# Patient Record
Sex: FEMALE | Race: WHITE | Employment: UNEMPLOYED
[De-identification: names, ages, dates, MRNs, and addresses within clinical notes are randomized per-mention and may not be internally consistent; named-entity substitution may affect disease eponyms.]

---

## 2017-01-05 ENCOUNTER — TELEPHONE (OUTPATIENT)
Dept: PEDIATRIC UROLOGY | Facility: CLINIC | Age: 22
End: 2017-01-05

## 2017-02-09 ENCOUNTER — TELEPHONE (OUTPATIENT)
Dept: UROLOGY | Facility: CLINIC | Age: 22
End: 2017-02-09

## 2017-03-01 ENCOUNTER — TELEPHONE (OUTPATIENT)
Dept: UROLOGY | Facility: CLINIC | Age: 22
End: 2017-03-01

## 2017-03-02 ENCOUNTER — TELEPHONE (OUTPATIENT)
Dept: UROLOGY | Facility: CLINIC | Age: 22
End: 2017-03-02

## 2017-03-02 ENCOUNTER — HOSPITAL ENCOUNTER (OUTPATIENT)
Age: 22
Setting detail: SPECIMEN
Discharge: HOME | End: 2017-03-02
Payer: COMMERCIAL

## 2017-03-02 ENCOUNTER — OFFICE VISIT (OUTPATIENT)
Dept: UROLOGY | Facility: CLINIC | Age: 22
End: 2017-03-02

## 2017-03-02 VITALS
TEMPERATURE: 98.4 F | DIASTOLIC BLOOD PRESSURE: 82 MMHG | HEIGHT: 60 IN | HEART RATE: 84 BPM | BODY MASS INDEX: 31.45 KG/M2 | SYSTOLIC BLOOD PRESSURE: 119 MMHG | WEIGHT: 160.2 LBS

## 2017-03-02 DIAGNOSIS — N39.0 LOWER URINARY TRACT INFECTIOUS DISEASE: ICD-10-CM

## 2017-03-02 DIAGNOSIS — Q05.9 MYELOMENINGOCELE (HCC): ICD-10-CM

## 2017-03-02 DIAGNOSIS — N31.9 NEUROGENIC BLADDER: Primary | ICD-10-CM

## 2017-03-02 DIAGNOSIS — R33.9 BLADDER RETENTION OF URINE: ICD-10-CM

## 2017-03-02 DIAGNOSIS — N31.9 NEUROGENIC BLADDER: ICD-10-CM

## 2017-03-02 LAB
-: ABNORMAL
AMORPHOUS: ABNORMAL
BACTERIA: ABNORMAL
BILIRUBIN URINE: NEGATIVE
BILIRUBIN, POC: ABNORMAL
BLOOD URINE, POC: ABNORMAL
CASTS UA: ABNORMAL /LPF (ref 0–8)
CLARITY, POC: ABNORMAL
COLOR, POC: YELLOW
COLOR: YELLOW
COMMENT UA: ABNORMAL
CRYSTALS, UA: ABNORMAL /HPF
EPITHELIAL CELLS UA: ABNORMAL /HPF (ref 0–5)
GLUCOSE URINE, POC: ABNORMAL
GLUCOSE URINE: NEGATIVE
KETONES, POC: ABNORMAL
KETONES, URINE: NEGATIVE
LEUKOCYTE EST, POC: ABNORMAL
LEUKOCYTE ESTERASE, URINE: ABNORMAL
MUCUS: ABNORMAL
NITRITE, POC: POSITIVE
NITRITE, URINE: POSITIVE
OTHER OBSERVATIONS UA: ABNORMAL
PH UA: 6 (ref 5–8)
PH, POC: ABNORMAL
PROTEIN UA: NEGATIVE
PROTEIN, POC: ABNORMAL
RBC UA: ABNORMAL /HPF (ref 0–4)
RENAL EPITHELIAL, UA: ABNORMAL /HPF
SPECIFIC GRAVITY UA: 1.01 (ref 1–1.03)
SPECIFIC GRAVITY, POC: ABNORMAL
TRICHOMONAS: ABNORMAL
TURBIDITY: ABNORMAL
URINE HGB: ABNORMAL
UROBILINOGEN, POC: ABNORMAL
UROBILINOGEN, URINE: NORMAL
WBC UA: ABNORMAL /HPF (ref 0–5)
YEAST: ABNORMAL

## 2017-03-02 PROCEDURE — G8417 CALC BMI ABV UP PARAM F/U: HCPCS | Performed by: NURSE PRACTITIONER

## 2017-03-02 PROCEDURE — 99204 OFFICE O/P NEW MOD 45 MIN: CPT | Performed by: NURSE PRACTITIONER

## 2017-03-02 PROCEDURE — G8484 FLU IMMUNIZE NO ADMIN: HCPCS | Performed by: NURSE PRACTITIONER

## 2017-03-02 PROCEDURE — 81002 URINALYSIS NONAUTO W/O SCOPE: CPT | Performed by: NURSE PRACTITIONER

## 2017-03-02 PROCEDURE — 4004F PT TOBACCO SCREEN RCVD TLK: CPT | Performed by: NURSE PRACTITIONER

## 2017-03-02 PROCEDURE — G8427 DOCREV CUR MEDS BY ELIG CLIN: HCPCS | Performed by: NURSE PRACTITIONER

## 2017-03-02 RX ORDER — TRAZODONE HYDROCHLORIDE 50 MG/1
TABLET ORAL
Refills: 0 | COMMUNITY
Start: 2017-02-24 | End: 2019-03-21 | Stop reason: ALTCHOICE

## 2017-03-02 RX ORDER — FAMOTIDINE 20 MG/1
TABLET, FILM COATED ORAL
Refills: 0 | COMMUNITY
Start: 2017-01-13

## 2017-03-02 RX ORDER — TOPIRAMATE 25 MG/1
TABLET ORAL
Refills: 0 | COMMUNITY
Start: 2017-02-24 | End: 2019-09-23 | Stop reason: ALTCHOICE

## 2017-03-02 ASSESSMENT — ENCOUNTER SYMPTOMS
COUGH: 0
NAUSEA: 1
VOMITING: 0
WHEEZING: 0
COLOR CHANGE: 0
ABDOMINAL PAIN: 1
SHORTNESS OF BREATH: 0
EYE REDNESS: 0
BACK PAIN: 0
EYE PAIN: 0

## 2017-03-04 LAB
CULTURE: ABNORMAL
CULTURE: ABNORMAL
Lab: ABNORMAL
ORGANISM: ABNORMAL
SPECIMEN DESCRIPTION: ABNORMAL
STATUS: ABNORMAL

## 2017-03-06 DIAGNOSIS — N39.0 URINARY TRACT INFECTION, SITE UNSPECIFIED: Primary | ICD-10-CM

## 2017-03-06 RX ORDER — CEPHALEXIN 500 MG/1
500 CAPSULE ORAL 3 TIMES DAILY
Qty: 21 CAPSULE | Refills: 0 | Status: SHIPPED | OUTPATIENT
Start: 2017-03-06 | End: 2019-03-21

## 2017-03-07 ENCOUNTER — TELEPHONE (OUTPATIENT)
Dept: UROLOGY | Facility: CLINIC | Age: 22
End: 2017-03-07

## 2017-08-23 ENCOUNTER — TELEPHONE (OUTPATIENT)
Dept: UROLOGY | Age: 22
End: 2017-08-23

## 2019-03-21 ENCOUNTER — OFFICE VISIT (OUTPATIENT)
Dept: UROLOGY | Age: 24
End: 2019-03-21
Payer: MEDICARE

## 2019-03-21 VITALS
SYSTOLIC BLOOD PRESSURE: 98 MMHG | TEMPERATURE: 98.3 F | HEIGHT: 60 IN | BODY MASS INDEX: 31.96 KG/M2 | WEIGHT: 162.8 LBS | DIASTOLIC BLOOD PRESSURE: 66 MMHG | HEART RATE: 85 BPM

## 2019-03-21 DIAGNOSIS — N31.9 NEUROGENIC BLADDER: Primary | ICD-10-CM

## 2019-03-21 DIAGNOSIS — Q05.9 MYELOMENINGOCELE (HCC): ICD-10-CM

## 2019-03-21 PROCEDURE — 99213 OFFICE O/P EST LOW 20 MIN: CPT | Performed by: NURSE PRACTITIONER

## 2019-03-21 RX ORDER — LEVOFLOXACIN 500 MG/1
500 TABLET, FILM COATED ORAL DAILY
COMMUNITY
End: 2019-09-23 | Stop reason: ALTCHOICE

## 2019-03-21 RX ORDER — ESCITALOPRAM OXALATE 20 MG/1
1 TABLET ORAL DAILY
COMMUNITY
Start: 2019-01-01

## 2019-03-21 RX ORDER — ARIPIPRAZOLE 5 MG/1
10 TABLET ORAL DAILY
COMMUNITY
Start: 2019-01-03 | End: 2019-09-23 | Stop reason: ALTCHOICE

## 2019-03-21 RX ORDER — QUETIAPINE FUMARATE 25 MG/1
200 TABLET, FILM COATED ORAL NIGHTLY
COMMUNITY
Start: 2018-12-29

## 2019-03-21 RX ORDER — ONDANSETRON 4 MG/1
1 TABLET, FILM COATED ORAL 3 TIMES DAILY PRN
Refills: 0 | COMMUNITY
Start: 2019-01-10 | End: 2019-03-21

## 2019-03-21 RX ORDER — PRAZOSIN HYDROCHLORIDE 1 MG/1
1 CAPSULE ORAL NIGHTLY
COMMUNITY
Start: 2018-12-13

## 2019-03-21 RX ORDER — ESTRADIOL 0.1 MG/D
FILM, EXTENDED RELEASE TRANSDERMAL
COMMUNITY
Start: 2018-11-27 | End: 2019-09-23 | Stop reason: ALTCHOICE

## 2019-03-21 RX ORDER — LORAZEPAM 0.5 MG/1
0.5 TABLET ORAL 2 TIMES DAILY PRN
COMMUNITY
Start: 2018-12-27 | End: 2019-09-23

## 2019-03-21 ASSESSMENT — ENCOUNTER SYMPTOMS
ABDOMINAL PAIN: 0
BACK PAIN: 0
COLOR CHANGE: 0
VOMITING: 0
SHORTNESS OF BREATH: 0
EYE REDNESS: 0
COUGH: 0
EYE PAIN: 0
NAUSEA: 0
WHEEZING: 0

## 2019-09-23 ENCOUNTER — OFFICE VISIT (OUTPATIENT)
Dept: UROLOGY | Age: 24
End: 2019-09-23
Payer: MEDICARE

## 2019-09-23 ENCOUNTER — ANESTHESIA EVENT (OUTPATIENT)
Dept: OPERATING ROOM | Age: 24
End: 2019-09-23
Payer: MEDICARE

## 2019-09-23 ENCOUNTER — HOSPITAL ENCOUNTER (OUTPATIENT)
Age: 24
Setting detail: OUTPATIENT SURGERY
Discharge: HOME | End: 2019-09-23
Attending: UROLOGY | Admitting: UROLOGY
Payer: MEDICARE

## 2019-09-23 ENCOUNTER — ANESTHESIA (OUTPATIENT)
Dept: OPERATING ROOM | Age: 24
End: 2019-09-23
Payer: MEDICARE

## 2019-09-23 ENCOUNTER — APPOINTMENT (OUTPATIENT)
Dept: GENERAL RADIOLOGY | Age: 24
End: 2019-09-23
Attending: UROLOGY
Payer: MEDICARE

## 2019-09-23 VITALS
RESPIRATION RATE: 22 BRPM | OXYGEN SATURATION: 94 % | DIASTOLIC BLOOD PRESSURE: 77 MMHG | SYSTOLIC BLOOD PRESSURE: 105 MMHG

## 2019-09-23 VITALS
OXYGEN SATURATION: 94 % | HEIGHT: 60 IN | RESPIRATION RATE: 12 BRPM | WEIGHT: 167 LBS | TEMPERATURE: 98.1 F | BODY MASS INDEX: 32.79 KG/M2 | DIASTOLIC BLOOD PRESSURE: 80 MMHG | HEART RATE: 63 BPM | SYSTOLIC BLOOD PRESSURE: 112 MMHG

## 2019-09-23 VITALS
TEMPERATURE: 98.7 F | BODY MASS INDEX: 33.38 KG/M2 | HEIGHT: 60 IN | SYSTOLIC BLOOD PRESSURE: 108 MMHG | DIASTOLIC BLOOD PRESSURE: 79 MMHG | WEIGHT: 170 LBS | HEART RATE: 80 BPM

## 2019-09-23 DIAGNOSIS — N13.2 HYDRONEPHROSIS WITH URINARY OBSTRUCTION DUE TO URETERAL CALCULUS: ICD-10-CM

## 2019-09-23 DIAGNOSIS — N20.0 KIDNEY STONE: ICD-10-CM

## 2019-09-23 DIAGNOSIS — N31.9 NEUROGENIC BLADDER: Primary | ICD-10-CM

## 2019-09-23 DIAGNOSIS — N20.1 CALCULUS OF URETER: Primary | ICD-10-CM

## 2019-09-23 LAB — HCG, PREGNANCY URINE (POC): NEGATIVE

## 2019-09-23 PROCEDURE — 99214 OFFICE O/P EST MOD 30 MIN: CPT | Performed by: UROLOGY

## 2019-09-23 PROCEDURE — 6360000002 HC RX W HCPCS: Performed by: NURSE ANESTHETIST, CERTIFIED REGISTERED

## 2019-09-23 PROCEDURE — 81025 URINE PREGNANCY TEST: CPT

## 2019-09-23 PROCEDURE — C2617 STENT, NON-COR, TEM W/O DEL: HCPCS | Performed by: UROLOGY

## 2019-09-23 PROCEDURE — 7100000010 HC PHASE II RECOVERY - FIRST 15 MIN: Performed by: UROLOGY

## 2019-09-23 PROCEDURE — C1758 CATHETER, URETERAL: HCPCS | Performed by: UROLOGY

## 2019-09-23 PROCEDURE — 3700000001 HC ADD 15 MINUTES (ANESTHESIA): Performed by: UROLOGY

## 2019-09-23 PROCEDURE — 2709999900 HC NON-CHARGEABLE SUPPLY: Performed by: UROLOGY

## 2019-09-23 PROCEDURE — 3600000002 HC SURGERY LEVEL 2 BASE: Performed by: UROLOGY

## 2019-09-23 PROCEDURE — 6360000002 HC RX W HCPCS: Performed by: ANESTHESIOLOGY

## 2019-09-23 PROCEDURE — 7100000011 HC PHASE II RECOVERY - ADDTL 15 MIN: Performed by: UROLOGY

## 2019-09-23 PROCEDURE — C1769 GUIDE WIRE: HCPCS | Performed by: UROLOGY

## 2019-09-23 PROCEDURE — 2580000003 HC RX 258: Performed by: ANESTHESIOLOGY

## 2019-09-23 PROCEDURE — 3600000012 HC SURGERY LEVEL 2 ADDTL 15MIN: Performed by: UROLOGY

## 2019-09-23 PROCEDURE — 3700000000 HC ANESTHESIA ATTENDED CARE: Performed by: UROLOGY

## 2019-09-23 PROCEDURE — 2580000003 HC RX 258: Performed by: NURSE ANESTHETIST, CERTIFIED REGISTERED

## 2019-09-23 PROCEDURE — 74018 RADEX ABDOMEN 1 VIEW: CPT

## 2019-09-23 PROCEDURE — 2580000003 HC RX 258: Performed by: UROLOGY

## 2019-09-23 DEVICE — STENT URET 6FR L24CM PERCFLX HYDR+ DBL PGTL THRD 2
Type: IMPLANTABLE DEVICE | Site: URETER | Status: FUNCTIONAL
Removed: 2019-10-02

## 2019-09-23 RX ORDER — PROPOFOL 10 MG/ML
INJECTION, EMULSION INTRAVENOUS CONTINUOUS PRN
Status: DISCONTINUED | OUTPATIENT
Start: 2019-09-23 | End: 2019-09-23 | Stop reason: SDUPTHER

## 2019-09-23 RX ORDER — ONDANSETRON 2 MG/ML
4 INJECTION INTRAMUSCULAR; INTRAVENOUS
Status: DISCONTINUED | OUTPATIENT
Start: 2019-09-23 | End: 2019-09-23 | Stop reason: HOSPADM

## 2019-09-23 RX ORDER — PROPOFOL 10 MG/ML
INJECTION, EMULSION INTRAVENOUS PRN
Status: DISCONTINUED | OUTPATIENT
Start: 2019-09-23 | End: 2019-09-23 | Stop reason: SDUPTHER

## 2019-09-23 RX ORDER — FENTANYL CITRATE 50 UG/ML
INJECTION, SOLUTION INTRAMUSCULAR; INTRAVENOUS PRN
Status: DISCONTINUED | OUTPATIENT
Start: 2019-09-23 | End: 2019-09-23 | Stop reason: SDUPTHER

## 2019-09-23 RX ORDER — MIDAZOLAM HYDROCHLORIDE 1 MG/ML
1 INJECTION INTRAMUSCULAR; INTRAVENOUS EVERY 10 MIN PRN
Status: COMPLETED | OUTPATIENT
Start: 2019-09-23 | End: 2019-09-23

## 2019-09-23 RX ORDER — CIPROFLOXACIN 500 MG/1
500 TABLET, FILM COATED ORAL 2 TIMES DAILY
COMMUNITY
End: 2019-10-01 | Stop reason: ALTCHOICE

## 2019-09-23 RX ORDER — FENTANYL CITRATE 50 UG/ML
25 INJECTION, SOLUTION INTRAMUSCULAR; INTRAVENOUS EVERY 5 MIN PRN
Status: COMPLETED | OUTPATIENT
Start: 2019-09-23 | End: 2019-09-23

## 2019-09-23 RX ORDER — SODIUM CHLORIDE, SODIUM LACTATE, POTASSIUM CHLORIDE, CALCIUM CHLORIDE 600; 310; 30; 20 MG/100ML; MG/100ML; MG/100ML; MG/100ML
INJECTION, SOLUTION INTRAVENOUS CONTINUOUS
Status: DISCONTINUED | OUTPATIENT
Start: 2019-09-23 | End: 2019-09-23 | Stop reason: HOSPADM

## 2019-09-23 RX ORDER — SODIUM CHLORIDE, SODIUM LACTATE, POTASSIUM CHLORIDE, CALCIUM CHLORIDE 600; 310; 30; 20 MG/100ML; MG/100ML; MG/100ML; MG/100ML
INJECTION, SOLUTION INTRAVENOUS CONTINUOUS PRN
Status: DISCONTINUED | OUTPATIENT
Start: 2019-09-23 | End: 2019-09-23 | Stop reason: SDUPTHER

## 2019-09-23 RX ORDER — OXYCODONE HYDROCHLORIDE AND ACETAMINOPHEN 5; 325 MG/1; MG/1
1 TABLET ORAL EVERY 6 HOURS PRN
Qty: 20 TABLET | Refills: 0 | Status: SHIPPED | OUTPATIENT
Start: 2019-09-23 | End: 2019-09-30

## 2019-09-23 RX ORDER — MIDAZOLAM HYDROCHLORIDE 1 MG/ML
INJECTION INTRAMUSCULAR; INTRAVENOUS PRN
Status: DISCONTINUED | OUTPATIENT
Start: 2019-09-23 | End: 2019-09-23 | Stop reason: SDUPTHER

## 2019-09-23 RX ORDER — ACETAMINOPHEN 500 MG
1000 TABLET ORAL EVERY 6 HOURS PRN
Status: ON HOLD | COMMUNITY
End: 2019-10-19 | Stop reason: HOSPADM

## 2019-09-23 RX ORDER — CEFAZOLIN SODIUM 1 G/3ML
INJECTION, POWDER, FOR SOLUTION INTRAMUSCULAR; INTRAVENOUS PRN
Status: DISCONTINUED | OUTPATIENT
Start: 2019-09-23 | End: 2019-09-23 | Stop reason: SDUPTHER

## 2019-09-23 RX ORDER — LABETALOL HYDROCHLORIDE 5 MG/ML
5 INJECTION, SOLUTION INTRAVENOUS EVERY 10 MIN PRN
Status: DISCONTINUED | OUTPATIENT
Start: 2019-09-23 | End: 2019-09-23 | Stop reason: HOSPADM

## 2019-09-23 RX ORDER — DIPHENHYDRAMINE HYDROCHLORIDE 50 MG/ML
12.5 INJECTION INTRAMUSCULAR; INTRAVENOUS
Status: DISCONTINUED | OUTPATIENT
Start: 2019-09-23 | End: 2019-09-23 | Stop reason: HOSPADM

## 2019-09-23 RX ORDER — OXYCODONE HYDROCHLORIDE AND ACETAMINOPHEN 5; 325 MG/1; MG/1
1 TABLET ORAL PRN
Status: DISCONTINUED | OUTPATIENT
Start: 2019-09-23 | End: 2019-09-23 | Stop reason: HOSPADM

## 2019-09-23 RX ORDER — FENTANYL CITRATE 50 UG/ML
25 INJECTION, SOLUTION INTRAMUSCULAR; INTRAVENOUS EVERY 5 MIN PRN
Status: DISCONTINUED | OUTPATIENT
Start: 2019-09-23 | End: 2019-09-23 | Stop reason: HOSPADM

## 2019-09-23 RX ORDER — MAGNESIUM HYDROXIDE 1200 MG/15ML
LIQUID ORAL CONTINUOUS PRN
Status: COMPLETED | OUTPATIENT
Start: 2019-09-23 | End: 2019-09-23

## 2019-09-23 RX ORDER — MIDAZOLAM HYDROCHLORIDE 1 MG/ML
0.5 INJECTION INTRAMUSCULAR; INTRAVENOUS 4 TIMES DAILY PRN
Status: DISCONTINUED | OUTPATIENT
Start: 2019-09-23 | End: 2019-09-23

## 2019-09-23 RX ORDER — BUSPIRONE HYDROCHLORIDE 15 MG/1
15 TABLET ORAL 2 TIMES DAILY
COMMUNITY

## 2019-09-23 RX ORDER — MORPHINE SULFATE 2 MG/ML
2 INJECTION, SOLUTION INTRAMUSCULAR; INTRAVENOUS EVERY 5 MIN PRN
Status: DISCONTINUED | OUTPATIENT
Start: 2019-09-23 | End: 2019-09-23 | Stop reason: HOSPADM

## 2019-09-23 RX ORDER — OXYCODONE HYDROCHLORIDE AND ACETAMINOPHEN 5; 325 MG/1; MG/1
2 TABLET ORAL PRN
Status: DISCONTINUED | OUTPATIENT
Start: 2019-09-23 | End: 2019-09-23 | Stop reason: HOSPADM

## 2019-09-23 RX ADMIN — FENTANYL CITRATE 25 MCG: 50 INJECTION INTRAMUSCULAR; INTRAVENOUS at 17:42

## 2019-09-23 RX ADMIN — FENTANYL CITRATE 25 MCG: 50 INJECTION INTRAMUSCULAR; INTRAVENOUS at 15:33

## 2019-09-23 RX ADMIN — PROPOFOL 50 MG: 10 INJECTION, EMULSION INTRAVENOUS at 16:13

## 2019-09-23 RX ADMIN — FENTANYL CITRATE 50 MCG: 50 INJECTION INTRAMUSCULAR; INTRAVENOUS at 16:10

## 2019-09-23 RX ADMIN — MIDAZOLAM HYDROCHLORIDE 2 MG: 1 INJECTION, SOLUTION INTRAMUSCULAR; INTRAVENOUS at 16:10

## 2019-09-23 RX ADMIN — MIDAZOLAM HYDROCHLORIDE 1 MG: 1 INJECTION, SOLUTION INTRAMUSCULAR; INTRAVENOUS at 14:25

## 2019-09-23 RX ADMIN — PROPOFOL 100 MCG/KG/MIN: 10 INJECTION, EMULSION INTRAVENOUS at 16:13

## 2019-09-23 RX ADMIN — SODIUM CHLORIDE, POTASSIUM CHLORIDE, SODIUM LACTATE AND CALCIUM CHLORIDE: 600; 310; 30; 20 INJECTION, SOLUTION INTRAVENOUS at 13:38

## 2019-09-23 RX ADMIN — FENTANYL CITRATE 25 MCG: 50 INJECTION INTRAMUSCULAR; INTRAVENOUS at 17:47

## 2019-09-23 RX ADMIN — FENTANYL CITRATE 25 MCG: 50 INJECTION INTRAMUSCULAR; INTRAVENOUS at 17:37

## 2019-09-23 RX ADMIN — CEFAZOLIN 2000 MG: 1 INJECTION, POWDER, FOR SOLUTION INTRAMUSCULAR; INTRAVENOUS at 16:17

## 2019-09-23 RX ADMIN — MIDAZOLAM HYDROCHLORIDE 1 MG: 1 INJECTION, SOLUTION INTRAMUSCULAR; INTRAVENOUS at 14:11

## 2019-09-23 RX ADMIN — FENTANYL CITRATE 50 MCG: 50 INJECTION INTRAMUSCULAR; INTRAVENOUS at 16:25

## 2019-09-23 RX ADMIN — SODIUM CHLORIDE, POTASSIUM CHLORIDE, SODIUM LACTATE AND CALCIUM CHLORIDE: 600; 310; 30; 20 INJECTION, SOLUTION INTRAVENOUS at 15:41

## 2019-09-23 ASSESSMENT — PULMONARY FUNCTION TESTS
PIF_VALUE: 1
PIF_VALUE: 0
PIF_VALUE: 1
PIF_VALUE: 0
PIF_VALUE: 1
PIF_VALUE: 0
PIF_VALUE: 1
PIF_VALUE: 0
PIF_VALUE: 0
PIF_VALUE: 1
PIF_VALUE: 3
PIF_VALUE: 1
PIF_VALUE: 1
PIF_VALUE: 0
PIF_VALUE: 1
PIF_VALUE: 1

## 2019-09-23 ASSESSMENT — ENCOUNTER SYMPTOMS
COLOR CHANGE: 0
EYE REDNESS: 0
RHINORRHEA: 1
WHEEZING: 0
BACK PAIN: 0
SHORTNESS OF BREATH: 1
EYE PAIN: 0
ABDOMINAL PAIN: 0
COUGH: 1
NAUSEA: 0
VOMITING: 0

## 2019-09-23 ASSESSMENT — PAIN DESCRIPTION - FREQUENCY: FREQUENCY: CONTINUOUS

## 2019-09-23 ASSESSMENT — PAIN SCALES - GENERAL
PAINLEVEL_OUTOF10: 5
PAINLEVEL_OUTOF10: 3
PAINLEVEL_OUTOF10: 3
PAINLEVEL_OUTOF10: 5

## 2019-09-23 ASSESSMENT — PAIN DESCRIPTION - PAIN TYPE: TYPE: SURGICAL PAIN

## 2019-09-23 ASSESSMENT — PAIN DESCRIPTION - ORIENTATION: ORIENTATION: RIGHT

## 2019-09-23 ASSESSMENT — PAIN DESCRIPTION - LOCATION: LOCATION: BACK

## 2019-09-23 ASSESSMENT — LIFESTYLE VARIABLES: SMOKING_STATUS: 1

## 2019-09-23 ASSESSMENT — PAIN DESCRIPTION - DESCRIPTORS
DESCRIPTORS: SHARP
DESCRIPTORS: CONSTANT;ACHING

## 2019-09-23 ASSESSMENT — PAIN - FUNCTIONAL ASSESSMENT: PAIN_FUNCTIONAL_ASSESSMENT: 0-10

## 2019-09-23 NOTE — H&P
shunt 4/08     Previous Urologic Surgery: Sunny Omer urethral lengethening procedure, bladder augmentation, Mitrofanoff procedure   Medications Prior to Admission:    Prior to Admission medications    Medication Sig Start Date End Date Taking? Authorizing Provider   brexpiprazole (REXULTI) 1 MG TABS tablet Take 1 mg by mouth daily   Yes Historical Provider, MD   estrogens, conjugated, (PREMARIN) 1.25 MG tablet Take 12.5 mg by mouth daily   Yes Historical Provider, MD   busPIRone (BUSPAR) 15 MG tablet Take 15 mg by mouth 2 times daily   Yes Historical Provider, MD   ciprofloxacin (CIPRO) 500 MG tablet Take 500 mg by mouth 2 times daily   Yes Historical Provider, MD   acetaminophen (TYLENOL) 500 MG tablet Take 1,000 mg by mouth every 6 hours as needed for Pain   Yes Historical Provider, MD   prazosin (MINIPRESS) 1 MG capsule Take 1 mg by mouth 2 times daily 12/13/18  Yes Historical Provider, MD   escitalopram (LEXAPRO) 20 MG tablet Take 1 tablet by mouth daily 1/1/19  Yes Historical Provider, MD   QUEtiapine (SEROQUEL) 25 MG tablet Take 50 mg by mouth nightly 12/29/18  Yes Historical Provider, MD   famotidine (PEPCID) 20 MG tablet take 1 tablet by mouth twice a day 1/13/17  Yes Historical Provider, MD   docusate sodium (COLACE) 100 MG capsule Take 100 mg by mouth daily. Yes Historical Provider, MD   OXcarbazepine (TRILEPTAL) 300 MG tablet Take 600 mg by mouth 2 times daily. Yes Historical Provider, MD   ondansetron (ZOFRAN-ODT) 4 MG disintegrating tablet Take 4 mg by mouth every 8 hours as needed for Nausea or Vomiting    Historical Provider, MD   ibuprofen (ADVIL;MOTRIN) 800 MG tablet Take 1 tablet by mouth every 8 hours as needed (pain). 2/11/14   An Owens DO   cetirizine (ZYRTEC) 10 MG tablet Take 10 mg by mouth daily as needed     Historical Provider, MD       Allergies:  Latex; Furadantin [nitrofurantoin]; Banana; Kiwi extract;  Phenobarbital; and Sulfa antibiotics    Social History:    Social History

## 2019-09-23 NOTE — PROGRESS NOTES
Review of Systems   Constitutional: Negative for activity change, chills and fever. HENT: Positive for congestion and rhinorrhea. Eyes: Negative for pain, redness and visual disturbance. Respiratory: Positive for cough and shortness of breath. Negative for wheezing. Cardiovascular: Negative for chest pain and leg swelling. Gastrointestinal: Negative for abdominal pain, nausea and vomiting. Genitourinary: Positive for flank pain and frequency. Negative for difficulty urinating, dysuria, hematuria, pelvic pain, vaginal bleeding and vaginal discharge. Musculoskeletal: Negative for back pain, joint swelling and myalgias. Skin: Negative for color change and rash. Neurological: Negative for dizziness, tremors and numbness. Hematological: Negative for adenopathy. Does not bruise/bleed easily.

## 2019-09-23 NOTE — PROGRESS NOTES
[facility]  [facility] UROLOGY SPECIALISTS - [facility]  [address] [facility] [address]  Dept: [address] [facility] Urology Office Note - Established    Patient:  Dilip Mckeon  YOB: 1995  Date: 9/23/2019    The patient is a 30 y.o. female whopresents today for evaluation of the following problems:   Chief Complaint   Patient presents with    Nephrolithiasis     [facility] f/u, kidney stone; pt c/o left flank pain        HPI  Here for follow up form [facility] for stone noted in Rt ureter with mod hydro seen on CT 9/15/19. She had a Hx urinary diversion in 1998. She is cathing 4-6x per day but has been cathing more since tthe Dx of stone because it helps releive eher pressure. She was given Cipro BID in ER for UTI- had misplaced it for a few days but started this past wed so will complete this Wed. She is using Percocet for pain BID for pain relief. She is uncomfortable 7/10 now. last dose of Percocet was 2 days ago. She has a bad cold, and has been in for the last few days. Pain is increasing with movement. bed      Summary of old records: N/A    Additional History: N/A    Procedures Today: N/A    Urinalysis today:  No results found for this visit on 09/23/19.     Imaging Reviewed during this Office Visit: 3mm prox right ureteral stone with severe hydro  (results were independently reviewed by physician and radiology report verified)    AUA Symptom Score (9/23/2019):                    @(5345)@          Last BUN and creatinine:  Lab Results   Component Value Date    BUN 17 05/30/2014     Lab Results   Component Value Date    CREATININE 0.51 05/30/2014       Additional Lab/Culture results: none    PAST MEDICAL, FAMILY AND SOCIAL HISTORY UPDATE:  Past Medical History:   Diagnosis Date    ADD (attention deficit disorder)     Anxiety     Depression     Fear of     anxiety r/t mask on face;     Hydrocephalus with V-P shunt    Internal tibial torsion     MDRO (multiple drug resistant organisms) resistance 3/2013    E.  Coli urine    Myelomeningocele ([facility] [address] L5, S1 level    Pyelonephritis 3/04    Seizure disorder ([facility] [address]     Self-catheterizes urinary bladder     Sleep apnea     does not use C-PAP    Spina bifida [facility])      Past Surgical History:   Procedure Laterality Date    BACK SURGERY  06/10/2014    release of tethered cord    BLADDER SURGERY  7/99 S/P urethral lengehtening and reimplantation with  bladder augmentation; revised 1/00    and mitrofanoff     CYSTOSCOPY  11/4/14    GROWTH PLATE SURGERY  63/81, stapled growth plate bilateral knees    HYSTERECTOMY, VAGINAL  2/10/2014    Partial    OSTEOTOMY  4/00, 12/94 bilateral distal tibial/fibula fib derotational osteotomy    OSTEOTOMY Left 2007    OTHER SURGICAL HISTORY  7/01 [place] tendon transfer    SALPINGO-OOPHORECTOMY Left 2/10/2014    with left sapinectomy, cystoscopy    VAGINAL PROLAPSE REPAIR  07/2016    VENTRICULOPERITONEAL SHUNT  9/12/93; revised 3/16/96; 1/94; 9/93; programmable shunt 4/08     Family History   Problem Relation Age of Onset    High Blood Pressure Mother     Other Mother         anxiety    High Blood Pressure Father      Outpatient Medications Marked as Taking for the 9/23/19 encounter (Office Visit) with Amy Junior MD   Medication Sig Dispense Refill    brexpiprazole (REXULTI) 1 MG TABS tablet Take 1 mg by mouth daily      estrogens, conjugated, (PREMARIN) 1.25 MG tablet Take 12.5 mg by mouth daily      busPIRone (BUSPAR) 15 MG tablet Take 15 mg by mouth 2 times daily      ciprofloxacin (CIPRO) 500 MG tablet Take 500 mg by mouth 2 times daily      prazosin (MINIPRESS) 1 MG capsule Take 1 mg by mouth 2 times daily      escitalopram (LEXAPRO) 20 MG tablet Take 1 tablet by mouth daily      QUEtiapine (SEROQUEL) 25 MG tablet Take 50 mg by mouth nightly      famotidine (PEPCID) 20 MG tablet take 1 tablet by mouth twice a day  0    ondansetron (ZOFRAN-ODT) 4 MG disintegrating tablet Take 4 mg by mouth every 8 hours as needed for Nausea or Vomiting      ibuprofen (ADVIL;MOTRIN) 800 MG tablet Take 1 tablet by mouth every 8 hours as needed (pain). 40 tablet 0    OXcarbazepine (TRILEPTAL) 300 MG tablet Take 600 mg by mouth 2 times daily.  cetirizine (ZYRTEC) 10 MG tablet Take 10 mg by mouth daily as needed         (All medications reviewed and updated by provider sincelast office visit or hospitalization)   Latex; Furadantin [nitrofurantoin]; Banana; Kiwi extract; Phenobarbital; and Sulfa antibiotics  Social History     Tobacco Use   Smoking Status Light Tobacco Smoker   Smokeless Tobacco Never Used   Tobacco Comment    QUIT 2013      (If patient a smoker, smoking cessation counseling offered)     Social History     Substance and Sexual Activity   Alcohol Use Yes    Comment: OCCASIONAL       REVIEW OF SYSTEMS:  Review of Systems      Physical Exam:      Vitals:    09/23/19 1042   BP: 108/79   Pulse: 80   Temp: 98.7 °F (37.1 °C)     Body mass index is 33.2 kg/m². Patient is a 30 y.o. female in noacute distress and alert and oriented to person, place and time. Physical Exam  Constitutional: Patient in no acute distress. Neuro: Alert andoriented to person, place and time. Psych: Mood normal, affect normal  Skin: No rash noted  HEENT: Head: Normocephalic and atraumatic  Conjunctivae and EOM are normal. Pupils are equal, round  Nose: Normal  Right External Ear: Normal; Left External Ear: Normal  Mouth: Mucosa Moist  Neck: Supple  Lungs: Respiratory effort is normal  Cardiovascular: Warm & Pink  Abdomen: Soft, non-tender, non-distended with no CVA,  No flank tenderness,  Or hepatosplenomegaly   Lymphatics: No palpable lymphadenopathy. Bladder non-tender and not distended. Musculoskeletal: Normalgait and station      Assessment and Plan      1. Neurogenic bladder    2. Kidney stone    3.  Hydronephrosis with urinary obstruction due to ureteral calculus           Plan:

## 2019-09-24 ENCOUNTER — TELEPHONE (OUTPATIENT)
Dept: UROLOGY | Age: 24
End: 2019-09-24

## 2019-09-24 DIAGNOSIS — N20.0 KIDNEY STONE: Primary | ICD-10-CM

## 2019-09-24 NOTE — ANESTHESIA POSTPROCEDURE EVALUATION
Department of Anesthesiology  Postprocedure Note    Patient: Laure Miller  MRN: [id number]  YOB: 1995  Date of evaluation: 9/23/2019  Time:  10:36 PM     Procedure Summary     Date:  09/23/19 Room / Location:  [facility] OR [address] [place] [address] OR    Anesthesia Start:  5274 Anesthesia Stop:  6200    Procedure:  CYSTOSCOPY URETERAL STENT INSERTION (Right ) Diagnosis:  (ADD-ON)    Surgeon:  Dionicio Rutledge MD Responsible Provider:  Tab Bustillo MD    Anesthesia Type:  MAC ASA Status:  3          Anesthesia Type: MAC    Diya Phase I: Diya Score: 8    Diya Phase II: Diya Score: 10    Last vitals: Reviewed and per EMR flowsheets.    POST-OP ANESTHESIA NOTE       /80   Pulse 63   Temp 98.1 °F (36.7 °C)   Resp 12   Ht 5' (1.524 m)   Wt 167 lb (75.8 kg)   LMP 02/06/2012   SpO2 94%   BMI 32.61 kg/m²    Pain Assessment: 0-10  Pain Level: 3          Anesthesia Post Evaluation    Patient location during evaluation: PACU  Patient participation: complete - patient participated  Level of consciousness: awake  Pain score: 3  Airway patency: patent  Nausea & Vomiting: no vomiting and no nausea  Complications: no  Cardiovascular status: hemodynamically stable  Respiratory status: acceptable  Hydration status: stable

## 2019-09-24 NOTE — OP NOTE
[facility] [address]                                OPERATIVE REPORT    PATIENT NAME: Marcia Jimenez                    :        1995  MED REC NO:   [id number]                             ROOM:  ACCOUNT NO:   [de-identified]                           ADMIT DATE: 2019  PROVIDER:     Dionicio Rutledge    DATE OF PROCEDURE:  2019    PREOPERATIVE DIAGNOSES:  Right ureteral stone and right flank pain. POSTOPERATIVE DIAGNOSES:  Right ureteral stone and right flank pain. OPERATION PERFORMED:  Cystoscopy with right ureteral stent placement. SURGEON:  Dionicio Rutledge MD    ANESTHESIA:  MAC.    COMPLICATIONS:  None. BLEEDING:  None. DRAINS:  6 x 24 stent on the right. HISTORY OF PRESENT ILLNESS:  The patient is a 57-year-old female with a  history of neurogenic bladder. She has undergone an augmentation  cystoplasty and Marisol urethral lengthening procedure as well as  Mitrofanoff creation. She comes in with right flank pain to [facility] where a CT demonstrated 3-mm proximal right ureteral stone. She continued to have pain and is here today. PROCEDURE IN DETAIL:  The patient was brought back to the operating room  and laid on the operating table in the supine position. Once MAC  anesthesia was obtained, she was placed in dorsal lithotomy position and  prepped and draped in the usual sterile fashion. A time-out was  performed and she was properly identified. Antibiotics were  administered. I advanced the flexible cystoscope through the urethral  lengthening into the bladder and pancystoscopy was performed. I was  unable to identify the ureteral orifices as I suspected they would be  distal to the newly created urethral orifice secondary to urethral  lengthening. As a result, I tried as well with the adolescent  rigid  cystoscope to visualize the floor of the bladder.   Once again I

## 2019-09-25 ENCOUNTER — TELEPHONE (OUTPATIENT)
Dept: UROLOGY | Age: 24
End: 2019-09-25

## 2019-09-25 NOTE — TELEPHONE ENCOUNTER
Cysto, (RT) URS, HLL, (RT) stent placement/exchange @ ST 10/02/19 1:45pm   PAT same day           Spoke with patient, procedure info emailed 9/25/19.

## 2019-09-29 ENCOUNTER — HOSPITAL ENCOUNTER (EMERGENCY)
Age: 24
Discharge: HOME | End: 2019-09-29
Attending: EMERGENCY MEDICINE
Payer: MEDICARE

## 2019-09-29 ENCOUNTER — APPOINTMENT (OUTPATIENT)
Dept: GENERAL RADIOLOGY | Age: 24
End: 2019-09-29
Payer: MEDICARE

## 2019-09-29 VITALS
DIASTOLIC BLOOD PRESSURE: 67 MMHG | HEART RATE: 76 BPM | SYSTOLIC BLOOD PRESSURE: 97 MMHG | WEIGHT: 150 LBS | BODY MASS INDEX: 24.99 KG/M2 | OXYGEN SATURATION: 97 % | RESPIRATION RATE: 20 BRPM | TEMPERATURE: 98 F | HEIGHT: 65 IN

## 2019-09-29 DIAGNOSIS — Z46.6 ENCOUNTER FOR ADJUSTMENT OF URETERAL STENT: Primary | ICD-10-CM

## 2019-09-29 LAB
-: ABNORMAL
AMORPHOUS: ABNORMAL
BACTERIA: ABNORMAL
BILIRUBIN URINE: NEGATIVE
CASTS UA: ABNORMAL /LPF (ref 0–2)
COLOR: YELLOW
CRYSTALS, UA: ABNORMAL /HPF
EPITHELIAL CELLS UA: ABNORMAL /HPF (ref 0–5)
GLUCOSE URINE: NEGATIVE
KETONES, URINE: NEGATIVE
LEUKOCYTE ESTERASE, URINE: ABNORMAL
MUCUS: ABNORMAL
NITRITE, URINE: NEGATIVE
OTHER OBSERVATIONS UA: ABNORMAL
PH UA: 7.5 (ref 5–8)
PROTEIN UA: ABNORMAL
RBC UA: ABNORMAL /HPF (ref 0–2)
RENAL EPITHELIAL, UA: ABNORMAL /HPF
SPECIFIC GRAVITY UA: 1.01 (ref 1–1.03)
TRICHOMONAS: ABNORMAL
TURBIDITY: CLEAR
URINE HGB: ABNORMAL
UROBILINOGEN, URINE: NORMAL
WBC UA: ABNORMAL /HPF (ref 0–5)
YEAST: ABNORMAL

## 2019-09-29 PROCEDURE — 6370000000 HC RX 637 (ALT 250 FOR IP): Performed by: STUDENT IN AN ORGANIZED HEALTH CARE EDUCATION/TRAINING PROGRAM

## 2019-09-29 PROCEDURE — 74018 RADEX ABDOMEN 1 VIEW: CPT

## 2019-09-29 PROCEDURE — 99284 EMERGENCY DEPT VISIT MOD MDM: CPT

## 2019-09-29 PROCEDURE — 87086 URINE CULTURE/COLONY COUNT: CPT

## 2019-09-29 PROCEDURE — 81001 URINALYSIS AUTO W/SCOPE: CPT

## 2019-09-29 RX ORDER — ACETAMINOPHEN 500 MG
1000 TABLET ORAL ONCE
Status: COMPLETED | OUTPATIENT
Start: 2019-09-29 | End: 2019-09-29

## 2019-09-29 RX ADMIN — ACETAMINOPHEN 1000 MG: 500 TABLET ORAL at 10:29

## 2019-09-29 ASSESSMENT — PAIN DESCRIPTION - LOCATION: LOCATION: ABDOMEN

## 2019-09-29 ASSESSMENT — PAIN DESCRIPTION - PAIN TYPE: TYPE: ACUTE PAIN;CHRONIC PAIN

## 2019-09-29 ASSESSMENT — ENCOUNTER SYMPTOMS
VOMITING: 0
SHORTNESS OF BREATH: 0
BACK PAIN: 0
RHINORRHEA: 0
NAUSEA: 0
PHOTOPHOBIA: 0
ABDOMINAL PAIN: 1
WHEEZING: 0

## 2019-09-29 ASSESSMENT — PAIN DESCRIPTION - ORIENTATION: ORIENTATION: MID;LOWER

## 2019-09-29 ASSESSMENT — PAIN DESCRIPTION - FREQUENCY: FREQUENCY: CONTINUOUS

## 2019-09-29 ASSESSMENT — PAIN SCALES - WONG BAKER: WONGBAKER_NUMERICALRESPONSE: 8

## 2019-09-29 ASSESSMENT — PAIN SCALES - GENERAL: PAINLEVEL_OUTOF10: 4

## 2019-09-29 ASSESSMENT — PAIN DESCRIPTION - DESCRIPTORS: DESCRIPTORS: CONSTANT

## 2019-09-30 LAB
CULTURE: NO GROWTH
Lab: NORMAL
SPECIMEN DESCRIPTION: NORMAL

## 2019-10-01 ENCOUNTER — TELEPHONE (OUTPATIENT)
Dept: UROLOGY | Age: 24
End: 2019-10-01

## 2019-10-01 RX ORDER — OXYCODONE AND ACETAMINOPHEN 10; 325 MG/1; MG/1
2 TABLET ORAL EVERY 6 HOURS PRN
Status: ON HOLD | COMMUNITY
End: 2019-10-03 | Stop reason: HOSPADM

## 2019-10-02 ENCOUNTER — APPOINTMENT (OUTPATIENT)
Dept: INTERVENTIONAL RADIOLOGY/VASCULAR | Age: 24
End: 2019-10-02
Attending: UROLOGY
Payer: MEDICARE

## 2019-10-02 ENCOUNTER — ANESTHESIA (OUTPATIENT)
Dept: OPERATING ROOM | Age: 24
End: 2019-10-02
Payer: MEDICARE

## 2019-10-02 ENCOUNTER — HOSPITAL ENCOUNTER (OUTPATIENT)
Age: 24
Setting detail: OBSERVATION
Discharge: HOME | End: 2019-10-03
Attending: UROLOGY | Admitting: UROLOGY
Payer: MEDICARE

## 2019-10-02 ENCOUNTER — ANESTHESIA EVENT (OUTPATIENT)
Dept: INTERVENTIONAL RADIOLOGY/VASCULAR | Age: 24
End: 2019-10-02
Payer: MEDICARE

## 2019-10-02 ENCOUNTER — ANESTHESIA (OUTPATIENT)
Dept: INTERVENTIONAL RADIOLOGY/VASCULAR | Age: 24
End: 2019-10-02
Payer: MEDICARE

## 2019-10-02 ENCOUNTER — ANESTHESIA EVENT (OUTPATIENT)
Dept: OPERATING ROOM | Age: 24
End: 2019-10-02
Payer: MEDICARE

## 2019-10-02 ENCOUNTER — APPOINTMENT (OUTPATIENT)
Dept: GENERAL RADIOLOGY | Age: 24
End: 2019-10-02
Attending: UROLOGY
Payer: MEDICARE

## 2019-10-02 VITALS
RESPIRATION RATE: 19 BRPM | SYSTOLIC BLOOD PRESSURE: 102 MMHG | TEMPERATURE: 96 F | DIASTOLIC BLOOD PRESSURE: 80 MMHG | OXYGEN SATURATION: 98 %

## 2019-10-02 VITALS
DIASTOLIC BLOOD PRESSURE: 84 MMHG | SYSTOLIC BLOOD PRESSURE: 118 MMHG | RESPIRATION RATE: 12 BRPM | OXYGEN SATURATION: 99 %

## 2019-10-02 DIAGNOSIS — N20.1 URETERAL STONE: Primary | ICD-10-CM

## 2019-10-02 PROCEDURE — 7100000000 HC PACU RECOVERY - FIRST 15 MIN

## 2019-10-02 PROCEDURE — 2580000003 HC RX 258: Performed by: SPECIALIST

## 2019-10-02 PROCEDURE — C1769 GUIDE WIRE: HCPCS

## 2019-10-02 PROCEDURE — 6360000002 HC RX W HCPCS: Performed by: ANESTHESIOLOGY

## 2019-10-02 PROCEDURE — 2580000003 HC RX 258: Performed by: UROLOGY

## 2019-10-02 PROCEDURE — 6360000002 HC RX W HCPCS

## 2019-10-02 PROCEDURE — 3600000014 HC SURGERY LEVEL 4 ADDTL 15MIN: Performed by: UROLOGY

## 2019-10-02 PROCEDURE — 2500000003 HC RX 250 WO HCPCS: Performed by: SPECIALIST

## 2019-10-02 PROCEDURE — 50433 PLMT NEPHROURETERAL CATHETER: CPT | Performed by: RADIOLOGY

## 2019-10-02 PROCEDURE — 6370000000 HC RX 637 (ALT 250 FOR IP): Performed by: STUDENT IN AN ORGANIZED HEALTH CARE EDUCATION/TRAINING PROGRAM

## 2019-10-02 PROCEDURE — 3700000000 HC ANESTHESIA ATTENDED CARE

## 2019-10-02 PROCEDURE — 6360000002 HC RX W HCPCS: Performed by: NURSE ANESTHETIST, CERTIFIED REGISTERED

## 2019-10-02 PROCEDURE — 2709999900 HC NON-CHARGEABLE SUPPLY

## 2019-10-02 PROCEDURE — 3700000001 HC ADD 15 MINUTES (ANESTHESIA)

## 2019-10-02 PROCEDURE — 2500000003 HC RX 250 WO HCPCS: Performed by: NURSE ANESTHETIST, CERTIFIED REGISTERED

## 2019-10-02 PROCEDURE — 6360000002 HC RX W HCPCS: Performed by: SPECIALIST

## 2019-10-02 PROCEDURE — 3700000001 HC ADD 15 MINUTES (ANESTHESIA): Performed by: UROLOGY

## 2019-10-02 PROCEDURE — 3600000004 HC SURGERY LEVEL 4 BASE: Performed by: UROLOGY

## 2019-10-02 PROCEDURE — 3700000000 HC ANESTHESIA ATTENDED CARE: Performed by: UROLOGY

## 2019-10-02 PROCEDURE — 6360000002 HC RX W HCPCS: Performed by: STUDENT IN AN ORGANIZED HEALTH CARE EDUCATION/TRAINING PROGRAM

## 2019-10-02 PROCEDURE — C1758 CATHETER, URETERAL: HCPCS | Performed by: UROLOGY

## 2019-10-02 PROCEDURE — C2617 STENT, NON-COR, TEM W/O DEL: HCPCS

## 2019-10-02 PROCEDURE — 6360000004 HC RX CONTRAST MEDICATION: Performed by: UROLOGY

## 2019-10-02 PROCEDURE — C1894 INTRO/SHEATH, NON-LASER: HCPCS

## 2019-10-02 PROCEDURE — G0378 HOSPITAL OBSERVATION PER HR: HCPCS

## 2019-10-02 PROCEDURE — 2580000003 HC RX 258: Performed by: ANESTHESIOLOGY

## 2019-10-02 PROCEDURE — 74018 RADEX ABDOMEN 1 VIEW: CPT

## 2019-10-02 PROCEDURE — 7100000001 HC PACU RECOVERY - ADDTL 15 MIN

## 2019-10-02 PROCEDURE — C1769 GUIDE WIRE: HCPCS | Performed by: UROLOGY

## 2019-10-02 PROCEDURE — 2709999900 HC NON-CHARGEABLE SUPPLY: Performed by: UROLOGY

## 2019-10-02 PROCEDURE — 2580000003 HC RX 258: Performed by: NURSE ANESTHETIST, CERTIFIED REGISTERED

## 2019-10-02 PROCEDURE — 2580000003 HC RX 258: Performed by: STUDENT IN AN ORGANIZED HEALTH CARE EDUCATION/TRAINING PROGRAM

## 2019-10-02 RX ORDER — DEXAMETHASONE SODIUM PHOSPHATE 10 MG/ML
INJECTION INTRAMUSCULAR; INTRAVENOUS PRN
Status: DISCONTINUED | OUTPATIENT
Start: 2019-10-02 | End: 2019-10-02 | Stop reason: SDUPTHER

## 2019-10-02 RX ORDER — MEPERIDINE HYDROCHLORIDE 50 MG/ML
12.5 INJECTION INTRAMUSCULAR; INTRAVENOUS; SUBCUTANEOUS EVERY 5 MIN PRN
Status: DISCONTINUED | OUTPATIENT
Start: 2019-10-02 | End: 2019-10-02 | Stop reason: HOSPADM

## 2019-10-02 RX ORDER — FENTANYL CITRATE 50 UG/ML
50 INJECTION, SOLUTION INTRAMUSCULAR; INTRAVENOUS EVERY 5 MIN PRN
Status: COMPLETED | OUTPATIENT
Start: 2019-10-02 | End: 2019-10-02

## 2019-10-02 RX ORDER — SODIUM CHLORIDE 0.9 % (FLUSH) 0.9 %
10 SYRINGE (ML) INJECTION PRN
Status: DISCONTINUED | OUTPATIENT
Start: 2019-10-02 | End: 2019-10-03 | Stop reason: HOSPADM

## 2019-10-02 RX ORDER — LIDOCAINE HYDROCHLORIDE 10 MG/ML
INJECTION, SOLUTION EPIDURAL; INFILTRATION; INTRACAUDAL; PERINEURAL PRN
Status: DISCONTINUED | OUTPATIENT
Start: 2019-10-02 | End: 2019-10-02 | Stop reason: SDUPTHER

## 2019-10-02 RX ORDER — BUSPIRONE HYDROCHLORIDE 15 MG/1
15 TABLET ORAL 2 TIMES DAILY
Status: DISCONTINUED | OUTPATIENT
Start: 2019-10-02 | End: 2019-10-03 | Stop reason: HOSPADM

## 2019-10-02 RX ORDER — CETIRIZINE HYDROCHLORIDE 10 MG/1
10 TABLET ORAL DAILY PRN
Status: DISCONTINUED | OUTPATIENT
Start: 2019-10-02 | End: 2019-10-03 | Stop reason: HOSPADM

## 2019-10-02 RX ORDER — DOCUSATE SODIUM 100 MG/1
100 CAPSULE, LIQUID FILLED ORAL DAILY
Status: DISCONTINUED | OUTPATIENT
Start: 2019-10-02 | End: 2019-10-03 | Stop reason: HOSPADM

## 2019-10-02 RX ORDER — FENTANYL CITRATE 50 UG/ML
100 INJECTION, SOLUTION INTRAMUSCULAR; INTRAVENOUS ONCE
Status: DISCONTINUED | OUTPATIENT
Start: 2019-10-02 | End: 2019-10-02

## 2019-10-02 RX ORDER — SODIUM CHLORIDE, SODIUM LACTATE, POTASSIUM CHLORIDE, CALCIUM CHLORIDE 600; 310; 30; 20 MG/100ML; MG/100ML; MG/100ML; MG/100ML
INJECTION, SOLUTION INTRAVENOUS CONTINUOUS PRN
Status: DISCONTINUED | OUTPATIENT
Start: 2019-10-02 | End: 2019-10-02 | Stop reason: SDUPTHER

## 2019-10-02 RX ORDER — SODIUM CHLORIDE, SODIUM LACTATE, POTASSIUM CHLORIDE, CALCIUM CHLORIDE 600; 310; 30; 20 MG/100ML; MG/100ML; MG/100ML; MG/100ML
INJECTION, SOLUTION INTRAVENOUS CONTINUOUS
Status: DISCONTINUED | OUTPATIENT
Start: 2019-10-02 | End: 2019-10-02

## 2019-10-02 RX ORDER — ONDANSETRON 2 MG/ML
INJECTION INTRAMUSCULAR; INTRAVENOUS PRN
Status: DISCONTINUED | OUTPATIENT
Start: 2019-10-02 | End: 2019-10-02 | Stop reason: SDUPTHER

## 2019-10-02 RX ORDER — PROPOFOL 10 MG/ML
INJECTION, EMULSION INTRAVENOUS PRN
Status: DISCONTINUED | OUTPATIENT
Start: 2019-10-02 | End: 2019-10-02 | Stop reason: SDUPTHER

## 2019-10-02 RX ORDER — MAGNESIUM HYDROXIDE 1200 MG/15ML
LIQUID ORAL PRN
Status: DISCONTINUED | OUTPATIENT
Start: 2019-10-02 | End: 2019-10-02 | Stop reason: ALTCHOICE

## 2019-10-02 RX ORDER — ONDANSETRON 4 MG/1
4 TABLET, ORALLY DISINTEGRATING ORAL EVERY 8 HOURS PRN
Status: DISCONTINUED | OUTPATIENT
Start: 2019-10-02 | End: 2019-10-03 | Stop reason: HOSPADM

## 2019-10-02 RX ORDER — OXYCODONE HYDROCHLORIDE 5 MG/1
5 TABLET ORAL EVERY 6 HOURS PRN
Status: DISCONTINUED | OUTPATIENT
Start: 2019-10-02 | End: 2019-10-03 | Stop reason: HOSPADM

## 2019-10-02 RX ORDER — FENTANYL CITRATE 50 UG/ML
INJECTION, SOLUTION INTRAMUSCULAR; INTRAVENOUS
Status: COMPLETED
Start: 2019-10-02 | End: 2019-10-02

## 2019-10-02 RX ORDER — FENTANYL CITRATE 50 UG/ML
INJECTION, SOLUTION INTRAMUSCULAR; INTRAVENOUS PRN
Status: DISCONTINUED | OUTPATIENT
Start: 2019-10-02 | End: 2019-10-02 | Stop reason: SDUPTHER

## 2019-10-02 RX ORDER — SODIUM CHLORIDE 9 MG/ML
INJECTION, SOLUTION INTRAVENOUS CONTINUOUS PRN
Status: DISCONTINUED | OUTPATIENT
Start: 2019-10-02 | End: 2019-10-02 | Stop reason: SDUPTHER

## 2019-10-02 RX ORDER — OXYCODONE HYDROCHLORIDE AND ACETAMINOPHEN 5; 325 MG/1; MG/1
1 TABLET ORAL EVERY 6 HOURS PRN
Status: DISCONTINUED | OUTPATIENT
Start: 2019-10-02 | End: 2019-10-03 | Stop reason: HOSPADM

## 2019-10-02 RX ORDER — OXCARBAZEPINE 300 MG/1
600 TABLET, FILM COATED ORAL 2 TIMES DAILY
Status: DISCONTINUED | OUTPATIENT
Start: 2019-10-02 | End: 2019-10-03 | Stop reason: HOSPADM

## 2019-10-02 RX ORDER — CEPHALEXIN 500 MG/1
500 CAPSULE ORAL 3 TIMES DAILY
Qty: 15 CAPSULE | Refills: 0 | Status: SHIPPED | OUTPATIENT
Start: 2019-10-02 | End: 2019-10-07

## 2019-10-02 RX ORDER — PROPOFOL 10 MG/ML
INJECTION, EMULSION INTRAVENOUS CONTINUOUS PRN
Status: DISCONTINUED | OUTPATIENT
Start: 2019-10-02 | End: 2019-10-02 | Stop reason: SDUPTHER

## 2019-10-02 RX ORDER — SODIUM CHLORIDE 0.9 % (FLUSH) 0.9 %
10 SYRINGE (ML) INJECTION EVERY 12 HOURS SCHEDULED
Status: DISCONTINUED | OUTPATIENT
Start: 2019-10-02 | End: 2019-10-03 | Stop reason: HOSPADM

## 2019-10-02 RX ORDER — FENTANYL CITRATE 50 UG/ML
25 INJECTION, SOLUTION INTRAMUSCULAR; INTRAVENOUS EVERY 5 MIN PRN
Status: DISCONTINUED | OUTPATIENT
Start: 2019-10-02 | End: 2019-10-02 | Stop reason: HOSPADM

## 2019-10-02 RX ORDER — ESCITALOPRAM OXALATE 10 MG/1
20 TABLET ORAL DAILY
Status: DISCONTINUED | OUTPATIENT
Start: 2019-10-02 | End: 2019-10-03 | Stop reason: HOSPADM

## 2019-10-02 RX ORDER — QUETIAPINE FUMARATE 25 MG/1
50 TABLET, FILM COATED ORAL NIGHTLY
Status: DISCONTINUED | OUTPATIENT
Start: 2019-10-02 | End: 2019-10-03 | Stop reason: HOSPADM

## 2019-10-02 RX ORDER — OXYCODONE AND ACETAMINOPHEN 10; 325 MG/1; MG/1
1 TABLET ORAL EVERY 6 HOURS PRN
Status: DISCONTINUED | OUTPATIENT
Start: 2019-10-02 | End: 2019-10-02

## 2019-10-02 RX ORDER — FAMOTIDINE 20 MG/1
20 TABLET, FILM COATED ORAL DAILY
Status: DISCONTINUED | OUTPATIENT
Start: 2019-10-02 | End: 2019-10-03 | Stop reason: HOSPADM

## 2019-10-02 RX ORDER — MAGNESIUM HYDROXIDE 1200 MG/15ML
LIQUID ORAL CONTINUOUS PRN
Status: COMPLETED | OUTPATIENT
Start: 2019-10-02 | End: 2019-10-02

## 2019-10-02 RX ORDER — PRAZOSIN HYDROCHLORIDE 1 MG/1
1 CAPSULE ORAL 2 TIMES DAILY
Status: DISCONTINUED | OUTPATIENT
Start: 2019-10-02 | End: 2019-10-03 | Stop reason: HOSPADM

## 2019-10-02 RX ORDER — CEFAZOLIN SODIUM 1 G/3ML
INJECTION, POWDER, FOR SOLUTION INTRAMUSCULAR; INTRAVENOUS PRN
Status: DISCONTINUED | OUTPATIENT
Start: 2019-10-02 | End: 2019-10-02 | Stop reason: SDUPTHER

## 2019-10-02 RX ADMIN — FENTANYL CITRATE 50 MCG: 50 INJECTION INTRAMUSCULAR; INTRAVENOUS at 13:23

## 2019-10-02 RX ADMIN — PROPOFOL 120 MCG/KG/MIN: 10 INJECTION, EMULSION INTRAVENOUS at 16:30

## 2019-10-02 RX ADMIN — Medication 10 ML: at 21:19

## 2019-10-02 RX ADMIN — PROPOFOL 200 MG: 10 INJECTION, EMULSION INTRAVENOUS at 14:23

## 2019-10-02 RX ADMIN — SODIUM CHLORIDE, POTASSIUM CHLORIDE, SODIUM LACTATE AND CALCIUM CHLORIDE: 600; 310; 30; 20 INJECTION, SOLUTION INTRAVENOUS at 14:19

## 2019-10-02 RX ADMIN — FENTANYL CITRATE 25 MCG: 50 INJECTION INTRAMUSCULAR; INTRAVENOUS at 18:28

## 2019-10-02 RX ADMIN — OXYCODONE HYDROCHLORIDE 5 MG: 5 TABLET ORAL at 23:11

## 2019-10-02 RX ADMIN — ESCITALOPRAM OXALATE 20 MG: 10 TABLET ORAL at 21:18

## 2019-10-02 RX ADMIN — SODIUM CHLORIDE, POTASSIUM CHLORIDE, SODIUM LACTATE AND CALCIUM CHLORIDE: 600; 310; 30; 20 INJECTION, SOLUTION INTRAVENOUS at 13:07

## 2019-10-02 RX ADMIN — FAMOTIDINE 20 MG: 20 TABLET, FILM COATED ORAL at 21:18

## 2019-10-02 RX ADMIN — ESTROGENS, CONJUGATED 2.5 MG: 1.25 TABLET, FILM COATED ORAL at 21:17

## 2019-10-02 RX ADMIN — PROPOFOL 50 MG: 10 INJECTION, EMULSION INTRAVENOUS at 15:44

## 2019-10-02 RX ADMIN — DEXAMETHASONE SODIUM PHOSPHATE 10 MG: 10 INJECTION INTRAMUSCULAR; INTRAVENOUS at 14:32

## 2019-10-02 RX ADMIN — FENTANYL CITRATE 50 MCG: 50 INJECTION INTRAMUSCULAR; INTRAVENOUS at 16:21

## 2019-10-02 RX ADMIN — FENTANYL CITRATE 25 MCG: 50 INJECTION INTRAMUSCULAR; INTRAVENOUS at 18:00

## 2019-10-02 RX ADMIN — FENTANYL CITRATE 25 MCG: 50 INJECTION INTRAMUSCULAR; INTRAVENOUS at 17:52

## 2019-10-02 RX ADMIN — LIDOCAINE HYDROCHLORIDE 25 MG: 10 INJECTION, SOLUTION EPIDURAL; INFILTRATION; INTRACAUDAL; PERINEURAL at 17:17

## 2019-10-02 RX ADMIN — BUSPIRONE HYDROCHLORIDE 15 MG: 15 TABLET ORAL at 21:17

## 2019-10-02 RX ADMIN — SODIUM CHLORIDE: 9 INJECTION, SOLUTION INTRAVENOUS at 16:17

## 2019-10-02 RX ADMIN — PROPOFOL 50 MG: 10 INJECTION, EMULSION INTRAVENOUS at 15:48

## 2019-10-02 RX ADMIN — CEFAZOLIN 2000 MG: 1 INJECTION, POWDER, FOR SOLUTION INTRAMUSCULAR; INTRAVENOUS at 14:37

## 2019-10-02 RX ADMIN — LIDOCAINE HYDROCHLORIDE 50 MG: 10 INJECTION, SOLUTION EPIDURAL; INFILTRATION; INTRACAUDAL; PERINEURAL at 14:23

## 2019-10-02 RX ADMIN — IOVERSOL 26 ML: 509 INJECTION INTRA-ARTERIAL; INTRAVENOUS at 17:27

## 2019-10-02 RX ADMIN — DEXTROSE MONOHYDRATE 2 G: 50 INJECTION, SOLUTION INTRAVENOUS at 23:12

## 2019-10-02 RX ADMIN — QUETIAPINE FUMARATE 50 MG: 25 TABLET ORAL at 23:11

## 2019-10-02 RX ADMIN — DOCUSATE SODIUM 100 MG: 100 CAPSULE, LIQUID FILLED ORAL at 21:18

## 2019-10-02 RX ADMIN — PROPOFOL 50 MG: 10 INJECTION, EMULSION INTRAVENOUS at 15:45

## 2019-10-02 RX ADMIN — ONDANSETRON 4 MG: 2 INJECTION, SOLUTION INTRAMUSCULAR; INTRAVENOUS at 14:32

## 2019-10-02 RX ADMIN — FENTANYL CITRATE 25 MCG: 50 INJECTION INTRAMUSCULAR; INTRAVENOUS at 16:13

## 2019-10-02 RX ADMIN — OXYCODONE HYDROCHLORIDE AND ACETAMINOPHEN 1 TABLET: 5; 325 TABLET ORAL at 21:17

## 2019-10-02 RX ADMIN — FENTANYL CITRATE 25 MCG: 50 INJECTION INTRAMUSCULAR; INTRAVENOUS at 16:11

## 2019-10-02 RX ADMIN — OXCARBAZEPINE 600 MG: 300 TABLET, FILM COATED ORAL at 21:17

## 2019-10-02 ASSESSMENT — PULMONARY FUNCTION TESTS
PIF_VALUE: 17
PIF_VALUE: 16
PIF_VALUE: 17
PIF_VALUE: 1
PIF_VALUE: 0
PIF_VALUE: 17
PIF_VALUE: 1
PIF_VALUE: 0
PIF_VALUE: 1
PIF_VALUE: 0
PIF_VALUE: 17
PIF_VALUE: 0
PIF_VALUE: 14
PIF_VALUE: 17
PIF_VALUE: 17
PIF_VALUE: 0
PIF_VALUE: 1
PIF_VALUE: 17
PIF_VALUE: 17
PIF_VALUE: 1
PIF_VALUE: 0
PIF_VALUE: 1
PIF_VALUE: 1
PIF_VALUE: 17
PIF_VALUE: 5
PIF_VALUE: 5
PIF_VALUE: 0
PIF_VALUE: 0
PIF_VALUE: 17
PIF_VALUE: 16
PIF_VALUE: 17
PIF_VALUE: 0
PIF_VALUE: 17
PIF_VALUE: 16
PIF_VALUE: 17
PIF_VALUE: 1
PIF_VALUE: 17
PIF_VALUE: 16
PIF_VALUE: 0
PIF_VALUE: 0
PIF_VALUE: 17
PIF_VALUE: 4
PIF_VALUE: 0
PIF_VALUE: 17
PIF_VALUE: 0
PIF_VALUE: 16
PIF_VALUE: 0
PIF_VALUE: 17
PIF_VALUE: 1
PIF_VALUE: 0
PIF_VALUE: 1
PIF_VALUE: 4
PIF_VALUE: 17
PIF_VALUE: 0
PIF_VALUE: 7
PIF_VALUE: 17
PIF_VALUE: 17
PIF_VALUE: 5
PIF_VALUE: 17
PIF_VALUE: 17
PIF_VALUE: 16
PIF_VALUE: 1
PIF_VALUE: 1
PIF_VALUE: 17
PIF_VALUE: 0
PIF_VALUE: 17
PIF_VALUE: 0
PIF_VALUE: 1
PIF_VALUE: 0
PIF_VALUE: 17
PIF_VALUE: 1
PIF_VALUE: 17
PIF_VALUE: 0
PIF_VALUE: 17
PIF_VALUE: 0
PIF_VALUE: 17
PIF_VALUE: 0
PIF_VALUE: 1
PIF_VALUE: 17
PIF_VALUE: 16
PIF_VALUE: 0
PIF_VALUE: 0
PIF_VALUE: 1
PIF_VALUE: 0
PIF_VALUE: 5
PIF_VALUE: 17
PIF_VALUE: 17
PIF_VALUE: 1
PIF_VALUE: 17
PIF_VALUE: 4
PIF_VALUE: 2
PIF_VALUE: 17
PIF_VALUE: 17
PIF_VALUE: 0
PIF_VALUE: 0
PIF_VALUE: 17
PIF_VALUE: 0
PIF_VALUE: 17
PIF_VALUE: 17
PIF_VALUE: 1
PIF_VALUE: 6
PIF_VALUE: 19
PIF_VALUE: 1
PIF_VALUE: 0
PIF_VALUE: 17
PIF_VALUE: 17
PIF_VALUE: 0
PIF_VALUE: 1
PIF_VALUE: 0
PIF_VALUE: 0
PIF_VALUE: 16
PIF_VALUE: 13
PIF_VALUE: 0
PIF_VALUE: 17
PIF_VALUE: 17
PIF_VALUE: 16
PIF_VALUE: 0
PIF_VALUE: 17
PIF_VALUE: 1
PIF_VALUE: 16
PIF_VALUE: 17
PIF_VALUE: 1
PIF_VALUE: 17
PIF_VALUE: 0
PIF_VALUE: 0
PIF_VALUE: 17
PIF_VALUE: 0
PIF_VALUE: 1
PIF_VALUE: 1
PIF_VALUE: 17
PIF_VALUE: 17
PIF_VALUE: 1
PIF_VALUE: 17
PIF_VALUE: 16
PIF_VALUE: 0
PIF_VALUE: 17
PIF_VALUE: 0
PIF_VALUE: 1
PIF_VALUE: 17
PIF_VALUE: 4
PIF_VALUE: 17
PIF_VALUE: 17
PIF_VALUE: 1
PIF_VALUE: 17
PIF_VALUE: 4
PIF_VALUE: 17
PIF_VALUE: 0
PIF_VALUE: 17
PIF_VALUE: 0
PIF_VALUE: 0
PIF_VALUE: 17
PIF_VALUE: 17

## 2019-10-02 ASSESSMENT — PAIN SCALES - GENERAL
PAINLEVEL_OUTOF10: 5
PAINLEVEL_OUTOF10: 7
PAINLEVEL_OUTOF10: 8
PAINLEVEL_OUTOF10: 10
PAINLEVEL_OUTOF10: 8
PAINLEVEL_OUTOF10: 7
PAINLEVEL_OUTOF10: 0
PAINLEVEL_OUTOF10: 10
PAINLEVEL_OUTOF10: 10

## 2019-10-02 ASSESSMENT — PAIN DESCRIPTION - PAIN TYPE
TYPE: ACUTE PAIN;SURGICAL PAIN
TYPE: ACUTE PAIN

## 2019-10-02 ASSESSMENT — PAIN DESCRIPTION - DESCRIPTORS
DESCRIPTORS: STABBING
DESCRIPTORS: CONSTANT
DESCRIPTORS: STABBING

## 2019-10-02 ASSESSMENT — PAIN DESCRIPTION - ORIENTATION
ORIENTATION: RIGHT
ORIENTATION: RIGHT

## 2019-10-02 ASSESSMENT — PAIN DESCRIPTION - LOCATION
LOCATION: BACK
LOCATION: FLANK

## 2019-10-02 ASSESSMENT — PAIN - FUNCTIONAL ASSESSMENT: PAIN_FUNCTIONAL_ASSESSMENT: 0-10

## 2019-10-02 ASSESSMENT — PAIN DESCRIPTION - FREQUENCY: FREQUENCY: CONTINUOUS

## 2019-10-03 VITALS
TEMPERATURE: 98.4 F | BODY MASS INDEX: 32.2 KG/M2 | WEIGHT: 164 LBS | OXYGEN SATURATION: 95 % | HEART RATE: 75 BPM | SYSTOLIC BLOOD PRESSURE: 107 MMHG | DIASTOLIC BLOOD PRESSURE: 67 MMHG | RESPIRATION RATE: 16 BRPM | HEIGHT: 60 IN

## 2019-10-03 LAB
ANION GAP SERPL CALCULATED.3IONS-SCNC: 11 MMOL/L (ref 9–17)
BUN BLDV-MCNC: 14 MG/DL (ref 6–20)
BUN/CREAT BLD: ABNORMAL (ref 9–20)
CALCIUM SERPL-MCNC: 8 MG/DL (ref 8.6–10.4)
CHLORIDE BLD-SCNC: 104 MMOL/L (ref 98–107)
CO2: 23 MMOL/L (ref 20–31)
CREAT SERPL-MCNC: 0.39 MG/DL (ref 0.5–0.9)
GFR AFRICAN AMERICAN: >60 ML/MIN
GFR NON-AFRICAN AMERICAN: >60 ML/MIN
GFR SERPL CREATININE-BSD FRML MDRD: ABNORMAL ML/MIN/{1.73_M2}
GFR SERPL CREATININE-BSD FRML MDRD: ABNORMAL ML/MIN/{1.73_M2}
GLUCOSE BLD-MCNC: 141 MG/DL (ref 70–99)
HCT VFR BLD CALC: 38.5 % (ref 36.3–47.1)
HEMOGLOBIN: 13 G/DL (ref 11.9–15.1)
MCH RBC QN AUTO: 31 PG (ref 25.2–33.5)
MCHC RBC AUTO-ENTMCNC: 33.8 G/DL (ref 28.4–34.8)
MCV RBC AUTO: 91.9 FL (ref 82.6–102.9)
NRBC AUTOMATED: 0 PER 100 WBC
PDW BLD-RTO: 12.5 % (ref 11.8–14.4)
PLATELET # BLD: 265 K/UL (ref 138–453)
PMV BLD AUTO: 9.3 FL (ref 8.1–13.5)
POTASSIUM SERPL-SCNC: 4.9 MMOL/L (ref 3.7–5.3)
RBC # BLD: 4.19 M/UL (ref 3.95–5.11)
SODIUM BLD-SCNC: 138 MMOL/L (ref 135–144)
WBC # BLD: 13.6 K/UL (ref 3.5–11.3)

## 2019-10-03 PROCEDURE — G0378 HOSPITAL OBSERVATION PER HR: HCPCS

## 2019-10-03 PROCEDURE — 2580000003 HC RX 258: Performed by: STUDENT IN AN ORGANIZED HEALTH CARE EDUCATION/TRAINING PROGRAM

## 2019-10-03 PROCEDURE — 6360000002 HC RX W HCPCS: Performed by: STUDENT IN AN ORGANIZED HEALTH CARE EDUCATION/TRAINING PROGRAM

## 2019-10-03 PROCEDURE — 94761 N-INVAS EAR/PLS OXIMETRY MLT: CPT

## 2019-10-03 PROCEDURE — 96374 THER/PROPH/DIAG INJ IV PUSH: CPT

## 2019-10-03 PROCEDURE — 96376 TX/PRO/DX INJ SAME DRUG ADON: CPT

## 2019-10-03 PROCEDURE — 85027 COMPLETE CBC AUTOMATED: CPT

## 2019-10-03 PROCEDURE — 80048 BASIC METABOLIC PNL TOTAL CA: CPT

## 2019-10-03 PROCEDURE — 36415 COLL VENOUS BLD VENIPUNCTURE: CPT

## 2019-10-03 PROCEDURE — 2700000000 HC OXYGEN THERAPY PER DAY

## 2019-10-03 PROCEDURE — 6370000000 HC RX 637 (ALT 250 FOR IP): Performed by: STUDENT IN AN ORGANIZED HEALTH CARE EDUCATION/TRAINING PROGRAM

## 2019-10-03 RX ORDER — OXYCODONE HYDROCHLORIDE AND ACETAMINOPHEN 5; 325 MG/1; MG/1
1 TABLET ORAL EVERY 6 HOURS PRN
Qty: 20 TABLET | Refills: 0 | Status: SHIPPED | OUTPATIENT
Start: 2019-10-03 | End: 2019-10-08

## 2019-10-03 RX ORDER — KETOROLAC TROMETHAMINE 30 MG/ML
30 INJECTION, SOLUTION INTRAMUSCULAR; INTRAVENOUS EVERY 6 HOURS PRN
Status: COMPLETED | OUTPATIENT
Start: 2019-10-03 | End: 2019-10-03

## 2019-10-03 RX ORDER — KETOROLAC TROMETHAMINE 10 MG/1
10 TABLET, FILM COATED ORAL EVERY 6 HOURS PRN
Qty: 20 TABLET | Refills: 0 | Status: ON HOLD | OUTPATIENT
Start: 2019-10-03 | End: 2019-10-18 | Stop reason: ALTCHOICE

## 2019-10-03 RX ADMIN — KETOROLAC TROMETHAMINE 30 MG: 30 INJECTION, SOLUTION INTRAMUSCULAR at 14:35

## 2019-10-03 RX ADMIN — KETOROLAC TROMETHAMINE 30 MG: 30 INJECTION, SOLUTION INTRAMUSCULAR at 08:30

## 2019-10-03 RX ADMIN — OXYCODONE HYDROCHLORIDE AND ACETAMINOPHEN 1 TABLET: 5; 325 TABLET ORAL at 03:50

## 2019-10-03 RX ADMIN — DEXTROSE MONOHYDRATE 2 G: 50 INJECTION, SOLUTION INTRAVENOUS at 05:38

## 2019-10-03 RX ADMIN — ESTROGENS, CONJUGATED 2.5 MG: 1.25 TABLET, FILM COATED ORAL at 08:30

## 2019-10-03 RX ADMIN — BREXPIPRAZOLE 1 MG: 1 TABLET ORAL at 05:41

## 2019-10-03 RX ADMIN — BUSPIRONE HYDROCHLORIDE 15 MG: 15 TABLET ORAL at 08:30

## 2019-10-03 RX ADMIN — DOCUSATE SODIUM 100 MG: 100 CAPSULE, LIQUID FILLED ORAL at 08:30

## 2019-10-03 RX ADMIN — OXCARBAZEPINE 600 MG: 300 TABLET, FILM COATED ORAL at 08:30

## 2019-10-03 RX ADMIN — FAMOTIDINE 20 MG: 20 TABLET, FILM COATED ORAL at 08:30

## 2019-10-03 RX ADMIN — OXYCODONE HYDROCHLORIDE AND ACETAMINOPHEN 1 TABLET: 5; 325 TABLET ORAL at 13:02

## 2019-10-03 ASSESSMENT — PAIN SCALES - GENERAL
PAINLEVEL_OUTOF10: 5
PAINLEVEL_OUTOF10: 10
PAINLEVEL_OUTOF10: 7
PAINLEVEL_OUTOF10: 3
PAINLEVEL_OUTOF10: 5

## 2019-10-03 ASSESSMENT — PAIN SCALES - WONG BAKER
WONGBAKER_NUMERICALRESPONSE: 10

## 2019-10-07 ENCOUNTER — TELEPHONE (OUTPATIENT)
Dept: UROLOGY | Age: 24
End: 2019-10-07

## 2019-10-09 ENCOUNTER — TELEPHONE (OUTPATIENT)
Dept: UROLOGY | Age: 24
End: 2019-10-09

## 2019-10-18 ENCOUNTER — ANESTHESIA EVENT (OUTPATIENT)
Dept: OPERATING ROOM | Age: 24
End: 2019-10-18
Payer: MEDICARE

## 2019-10-18 ENCOUNTER — APPOINTMENT (OUTPATIENT)
Dept: GENERAL RADIOLOGY | Age: 24
End: 2019-10-18
Attending: UROLOGY
Payer: MEDICARE

## 2019-10-18 ENCOUNTER — HOSPITAL ENCOUNTER (OUTPATIENT)
Age: 24
Setting detail: OBSERVATION
Discharge: HOME | End: 2019-10-19
Attending: UROLOGY | Admitting: UROLOGY
Payer: MEDICARE

## 2019-10-18 ENCOUNTER — ANESTHESIA (OUTPATIENT)
Dept: OPERATING ROOM | Age: 24
End: 2019-10-18
Payer: MEDICARE

## 2019-10-18 VITALS — SYSTOLIC BLOOD PRESSURE: 133 MMHG | DIASTOLIC BLOOD PRESSURE: 101 MMHG | TEMPERATURE: 96.9 F | OXYGEN SATURATION: 96 %

## 2019-10-18 DIAGNOSIS — N20.0 RIGHT RENAL STONE: Primary | ICD-10-CM

## 2019-10-18 LAB
ANION GAP SERPL CALCULATED.3IONS-SCNC: 10 MMOL/L (ref 9–17)
BUN BLDV-MCNC: 18 MG/DL (ref 6–20)
BUN/CREAT BLD: ABNORMAL (ref 9–20)
CALCIUM SERPL-MCNC: 8.2 MG/DL (ref 8.6–10.4)
CHLORIDE BLD-SCNC: 104 MMOL/L (ref 98–107)
CO2: 21 MMOL/L (ref 20–31)
CREAT SERPL-MCNC: 0.42 MG/DL (ref 0.5–0.9)
GFR AFRICAN AMERICAN: >60 ML/MIN
GFR NON-AFRICAN AMERICAN: >60 ML/MIN
GFR SERPL CREATININE-BSD FRML MDRD: ABNORMAL ML/MIN/{1.73_M2}
GFR SERPL CREATININE-BSD FRML MDRD: ABNORMAL ML/MIN/{1.73_M2}
GLUCOSE BLD-MCNC: 109 MG/DL (ref 70–99)
HCT VFR BLD CALC: 42.8 % (ref 36.3–47.1)
HEMOGLOBIN: 13.8 G/DL (ref 11.9–15.1)
MCH RBC QN AUTO: 30.5 PG (ref 25.2–33.5)
MCHC RBC AUTO-ENTMCNC: 32.2 G/DL (ref 28.4–34.8)
MCV RBC AUTO: 94.7 FL (ref 82.6–102.9)
NRBC AUTOMATED: 0 PER 100 WBC
PDW BLD-RTO: 12.5 % (ref 11.8–14.4)
PLATELET # BLD: 343 K/UL (ref 138–453)
PMV BLD AUTO: 8.5 FL (ref 8.1–13.5)
POTASSIUM SERPL-SCNC: 4.8 MMOL/L (ref 3.7–5.3)
RBC # BLD: 4.52 M/UL (ref 3.95–5.11)
SODIUM BLD-SCNC: 135 MMOL/L (ref 135–144)
WBC # BLD: 11.1 K/UL (ref 3.5–11.3)

## 2019-10-18 PROCEDURE — 6370000000 HC RX 637 (ALT 250 FOR IP): Performed by: STUDENT IN AN ORGANIZED HEALTH CARE EDUCATION/TRAINING PROGRAM

## 2019-10-18 PROCEDURE — 2580000003 HC RX 258: Performed by: UROLOGY

## 2019-10-18 PROCEDURE — 3700000001 HC ADD 15 MINUTES (ANESTHESIA): Performed by: UROLOGY

## 2019-10-18 PROCEDURE — 3600000005 HC SURGERY LEVEL 5 BASE: Performed by: UROLOGY

## 2019-10-18 PROCEDURE — 7100000000 HC PACU RECOVERY - FIRST 15 MIN: Performed by: UROLOGY

## 2019-10-18 PROCEDURE — 2580000003 HC RX 258: Performed by: ANESTHESIOLOGY

## 2019-10-18 PROCEDURE — 3700000000 HC ANESTHESIA ATTENDED CARE: Performed by: UROLOGY

## 2019-10-18 PROCEDURE — C1894 INTRO/SHEATH, NON-LASER: HCPCS | Performed by: UROLOGY

## 2019-10-18 PROCEDURE — 6360000004 HC RX CONTRAST MEDICATION: Performed by: UROLOGY

## 2019-10-18 PROCEDURE — 2580000003 HC RX 258: Performed by: STUDENT IN AN ORGANIZED HEALTH CARE EDUCATION/TRAINING PROGRAM

## 2019-10-18 PROCEDURE — 6370000000 HC RX 637 (ALT 250 FOR IP): Performed by: NURSE ANESTHETIST, CERTIFIED REGISTERED

## 2019-10-18 PROCEDURE — 3600000015 HC SURGERY LEVEL 5 ADDTL 15MIN: Performed by: UROLOGY

## 2019-10-18 PROCEDURE — 2709999900 HC NON-CHARGEABLE SUPPLY: Performed by: UROLOGY

## 2019-10-18 PROCEDURE — C1726 CATH, BAL DIL, NON-VASCULAR: HCPCS | Performed by: UROLOGY

## 2019-10-18 PROCEDURE — 6360000002 HC RX W HCPCS: Performed by: STUDENT IN AN ORGANIZED HEALTH CARE EDUCATION/TRAINING PROGRAM

## 2019-10-18 PROCEDURE — C1769 GUIDE WIRE: HCPCS | Performed by: UROLOGY

## 2019-10-18 PROCEDURE — 74018 RADEX ABDOMEN 1 VIEW: CPT

## 2019-10-18 PROCEDURE — 6360000002 HC RX W HCPCS: Performed by: NURSE ANESTHETIST, CERTIFIED REGISTERED

## 2019-10-18 PROCEDURE — 80048 BASIC METABOLIC PNL TOTAL CA: CPT

## 2019-10-18 PROCEDURE — 87086 URINE CULTURE/COLONY COUNT: CPT

## 2019-10-18 PROCEDURE — 7100000001 HC PACU RECOVERY - ADDTL 15 MIN: Performed by: UROLOGY

## 2019-10-18 PROCEDURE — 6360000002 HC RX W HCPCS

## 2019-10-18 PROCEDURE — 2500000003 HC RX 250 WO HCPCS: Performed by: NURSE ANESTHETIST, CERTIFIED REGISTERED

## 2019-10-18 PROCEDURE — 85027 COMPLETE CBC AUTOMATED: CPT

## 2019-10-18 PROCEDURE — G0378 HOSPITAL OBSERVATION PER HR: HCPCS

## 2019-10-18 RX ORDER — ALBUTEROL SULFATE 90 UG/1
AEROSOL, METERED RESPIRATORY (INHALATION) PRN
Status: DISCONTINUED | OUTPATIENT
Start: 2019-10-18 | End: 2019-10-18 | Stop reason: SDUPTHER

## 2019-10-18 RX ORDER — SODIUM CHLORIDE, SODIUM LACTATE, POTASSIUM CHLORIDE, CALCIUM CHLORIDE 600; 310; 30; 20 MG/100ML; MG/100ML; MG/100ML; MG/100ML
INJECTION, SOLUTION INTRAVENOUS CONTINUOUS
Status: DISCONTINUED | OUTPATIENT
Start: 2019-10-18 | End: 2019-10-18

## 2019-10-18 RX ORDER — MAGNESIUM HYDROXIDE 1200 MG/15ML
LIQUID ORAL CONTINUOUS PRN
Status: COMPLETED | OUTPATIENT
Start: 2019-10-18 | End: 2019-10-18

## 2019-10-18 RX ORDER — SODIUM CHLORIDE 0.9 % (FLUSH) 0.9 %
10 SYRINGE (ML) INJECTION EVERY 12 HOURS SCHEDULED
Status: DISCONTINUED | OUTPATIENT
Start: 2019-10-18 | End: 2019-10-19 | Stop reason: HOSPADM

## 2019-10-18 RX ORDER — PROPOFOL 10 MG/ML
INJECTION, EMULSION INTRAVENOUS PRN
Status: DISCONTINUED | OUTPATIENT
Start: 2019-10-18 | End: 2019-10-18 | Stop reason: SDUPTHER

## 2019-10-18 RX ORDER — HYDROCODONE BITARTRATE AND ACETAMINOPHEN 5; 325 MG/1; MG/1
1 TABLET ORAL EVERY 4 HOURS PRN
Status: DISCONTINUED | OUTPATIENT
Start: 2019-10-18 | End: 2019-10-19 | Stop reason: HOSPADM

## 2019-10-18 RX ORDER — CEPHALEXIN 500 MG/1
500 CAPSULE ORAL 4 TIMES DAILY
Qty: 12 CAPSULE | Refills: 0 | Status: SHIPPED | OUTPATIENT
Start: 2019-10-18 | End: 2019-10-18 | Stop reason: SDUPTHER

## 2019-10-18 RX ORDER — GLYCOPYRROLATE 1 MG/5 ML
SYRINGE (ML) INTRAVENOUS PRN
Status: DISCONTINUED | OUTPATIENT
Start: 2019-10-18 | End: 2019-10-18 | Stop reason: SDUPTHER

## 2019-10-18 RX ORDER — LIDOCAINE HYDROCHLORIDE 10 MG/ML
INJECTION, SOLUTION EPIDURAL; INFILTRATION; INTRACAUDAL; PERINEURAL PRN
Status: DISCONTINUED | OUTPATIENT
Start: 2019-10-18 | End: 2019-10-18 | Stop reason: SDUPTHER

## 2019-10-18 RX ORDER — DEXAMETHASONE SODIUM PHOSPHATE 10 MG/ML
INJECTION INTRAMUSCULAR; INTRAVENOUS PRN
Status: DISCONTINUED | OUTPATIENT
Start: 2019-10-18 | End: 2019-10-18 | Stop reason: SDUPTHER

## 2019-10-18 RX ORDER — CEPHALEXIN 500 MG/1
500 CAPSULE ORAL 4 TIMES DAILY
Qty: 12 CAPSULE | Refills: 0 | Status: SHIPPED | OUTPATIENT
Start: 2019-10-19 | End: 2019-10-19 | Stop reason: SDUPTHER

## 2019-10-18 RX ORDER — MAGNESIUM HYDROXIDE 1200 MG/15ML
LIQUID ORAL PRN
Status: DISCONTINUED | OUTPATIENT
Start: 2019-10-18 | End: 2019-10-18 | Stop reason: ALTCHOICE

## 2019-10-18 RX ORDER — BUSPIRONE HYDROCHLORIDE 15 MG/1
15 TABLET ORAL 2 TIMES DAILY
Status: DISCONTINUED | OUTPATIENT
Start: 2019-10-18 | End: 2019-10-19 | Stop reason: HOSPADM

## 2019-10-18 RX ORDER — CETIRIZINE HYDROCHLORIDE 10 MG/1
10 TABLET ORAL DAILY PRN
Status: DISCONTINUED | OUTPATIENT
Start: 2019-10-18 | End: 2019-10-19 | Stop reason: HOSPADM

## 2019-10-18 RX ORDER — ROCURONIUM BROMIDE 10 MG/ML
INJECTION, SOLUTION INTRAVENOUS PRN
Status: DISCONTINUED | OUTPATIENT
Start: 2019-10-18 | End: 2019-10-18 | Stop reason: SDUPTHER

## 2019-10-18 RX ORDER — IBUPROFEN 800 MG/1
800 TABLET ORAL EVERY 8 HOURS PRN
Status: DISCONTINUED | OUTPATIENT
Start: 2019-10-18 | End: 2019-10-19 | Stop reason: HOSPADM

## 2019-10-18 RX ORDER — CEFAZOLIN SODIUM 1 G/3ML
INJECTION, POWDER, FOR SOLUTION INTRAMUSCULAR; INTRAVENOUS PRN
Status: DISCONTINUED | OUTPATIENT
Start: 2019-10-18 | End: 2019-10-18 | Stop reason: SDUPTHER

## 2019-10-18 RX ORDER — DOCUSATE SODIUM 100 MG/1
100 CAPSULE, LIQUID FILLED ORAL DAILY
Status: DISCONTINUED | OUTPATIENT
Start: 2019-10-18 | End: 2019-10-19 | Stop reason: HOSPADM

## 2019-10-18 RX ORDER — ACETAMINOPHEN 500 MG
1000 TABLET ORAL EVERY 6 HOURS PRN
Status: CANCELLED | OUTPATIENT
Start: 2019-10-18

## 2019-10-18 RX ORDER — ONDANSETRON 2 MG/ML
INJECTION INTRAMUSCULAR; INTRAVENOUS PRN
Status: DISCONTINUED | OUTPATIENT
Start: 2019-10-18 | End: 2019-10-18 | Stop reason: SDUPTHER

## 2019-10-18 RX ORDER — ESCITALOPRAM OXALATE 10 MG/1
20 TABLET ORAL DAILY
Status: DISCONTINUED | OUTPATIENT
Start: 2019-10-18 | End: 2019-10-19 | Stop reason: HOSPADM

## 2019-10-18 RX ORDER — SODIUM CHLORIDE 0.9 % (FLUSH) 0.9 %
10 SYRINGE (ML) INJECTION PRN
Status: DISCONTINUED | OUTPATIENT
Start: 2019-10-18 | End: 2019-10-19 | Stop reason: HOSPADM

## 2019-10-18 RX ORDER — PRAZOSIN HYDROCHLORIDE 1 MG/1
1 CAPSULE ORAL 2 TIMES DAILY
Status: DISCONTINUED | OUTPATIENT
Start: 2019-10-18 | End: 2019-10-19 | Stop reason: HOSPADM

## 2019-10-18 RX ORDER — FAMOTIDINE 20 MG/1
20 TABLET, FILM COATED ORAL 2 TIMES DAILY
Status: DISCONTINUED | OUTPATIENT
Start: 2019-10-18 | End: 2019-10-19 | Stop reason: HOSPADM

## 2019-10-18 RX ORDER — 0.9 % SODIUM CHLORIDE 0.9 %
VIAL (ML) INJECTION PRN
Status: DISCONTINUED | OUTPATIENT
Start: 2019-10-18 | End: 2019-10-18 | Stop reason: ALTCHOICE

## 2019-10-18 RX ORDER — QUETIAPINE FUMARATE 25 MG/1
50 TABLET, FILM COATED ORAL NIGHTLY
Status: DISCONTINUED | OUTPATIENT
Start: 2019-10-18 | End: 2019-10-19 | Stop reason: HOSPADM

## 2019-10-18 RX ORDER — SODIUM CHLORIDE 9 MG/ML
INJECTION, SOLUTION INTRAVENOUS CONTINUOUS
Status: DISCONTINUED | OUTPATIENT
Start: 2019-10-18 | End: 2019-10-19 | Stop reason: HOSPADM

## 2019-10-18 RX ORDER — DOCUSATE SODIUM 100 MG/1
100 CAPSULE, LIQUID FILLED ORAL DAILY
Qty: 30 CAPSULE | Refills: 0 | Status: SHIPPED | OUTPATIENT
Start: 2019-10-18 | End: 2019-10-19 | Stop reason: SDUPTHER

## 2019-10-18 RX ORDER — ONDANSETRON 2 MG/ML
4 INJECTION INTRAMUSCULAR; INTRAVENOUS EVERY 6 HOURS PRN
Status: DISCONTINUED | OUTPATIENT
Start: 2019-10-18 | End: 2019-10-19 | Stop reason: HOSPADM

## 2019-10-18 RX ORDER — FENTANYL CITRATE 50 UG/ML
INJECTION, SOLUTION INTRAMUSCULAR; INTRAVENOUS PRN
Status: DISCONTINUED | OUTPATIENT
Start: 2019-10-18 | End: 2019-10-18 | Stop reason: SDUPTHER

## 2019-10-18 RX ORDER — OXYCODONE HYDROCHLORIDE AND ACETAMINOPHEN 5; 325 MG/1; MG/1
2 TABLET ORAL EVERY 4 HOURS PRN
Status: DISCONTINUED | OUTPATIENT
Start: 2019-10-18 | End: 2019-10-18

## 2019-10-18 RX ORDER — OXYCODONE HYDROCHLORIDE AND ACETAMINOPHEN 5; 325 MG/1; MG/1
1 TABLET ORAL EVERY 4 HOURS PRN
Status: DISCONTINUED | OUTPATIENT
Start: 2019-10-18 | End: 2019-10-18

## 2019-10-18 RX ORDER — ACETAMINOPHEN 325 MG/1
650 TABLET ORAL EVERY 4 HOURS PRN
Status: DISCONTINUED | OUTPATIENT
Start: 2019-10-18 | End: 2019-10-19 | Stop reason: HOSPADM

## 2019-10-18 RX ORDER — OXCARBAZEPINE 300 MG/1
600 TABLET, FILM COATED ORAL 2 TIMES DAILY
Status: DISCONTINUED | OUTPATIENT
Start: 2019-10-18 | End: 2019-10-19 | Stop reason: HOSPADM

## 2019-10-18 RX ORDER — NEOSTIGMINE METHYLSULFATE 5 MG/5 ML
SYRINGE (ML) INTRAVENOUS PRN
Status: DISCONTINUED | OUTPATIENT
Start: 2019-10-18 | End: 2019-10-18 | Stop reason: SDUPTHER

## 2019-10-18 RX ORDER — HYDROCODONE BITARTRATE AND ACETAMINOPHEN 5; 325 MG/1; MG/1
2 TABLET ORAL EVERY 4 HOURS PRN
Status: DISCONTINUED | OUTPATIENT
Start: 2019-10-18 | End: 2019-10-19 | Stop reason: HOSPADM

## 2019-10-18 RX ORDER — MIDAZOLAM HYDROCHLORIDE 1 MG/ML
INJECTION INTRAMUSCULAR; INTRAVENOUS PRN
Status: DISCONTINUED | OUTPATIENT
Start: 2019-10-18 | End: 2019-10-18 | Stop reason: SDUPTHER

## 2019-10-18 RX ORDER — ONDANSETRON 4 MG/1
4 TABLET, ORALLY DISINTEGRATING ORAL EVERY 8 HOURS PRN
Status: DISCONTINUED | OUTPATIENT
Start: 2019-10-18 | End: 2019-10-19 | Stop reason: HOSPADM

## 2019-10-18 RX ORDER — OXYCODONE HYDROCHLORIDE AND ACETAMINOPHEN 5; 325 MG/1; MG/1
1 TABLET ORAL EVERY 6 HOURS PRN
Qty: 12 TABLET | Refills: 0 | Status: SHIPPED | OUTPATIENT
Start: 2019-10-18 | End: 2019-10-19 | Stop reason: HOSPADM

## 2019-10-18 RX ADMIN — Medication 0.6 MG: at 16:06

## 2019-10-18 RX ADMIN — FENTANYL CITRATE 100 MCG: 50 INJECTION INTRAMUSCULAR; INTRAVENOUS at 14:42

## 2019-10-18 RX ADMIN — BUSPIRONE HYDROCHLORIDE 15 MG: 15 TABLET ORAL at 21:27

## 2019-10-18 RX ADMIN — FENTANYL CITRATE 50 MCG: 50 INJECTION INTRAMUSCULAR; INTRAVENOUS at 16:39

## 2019-10-18 RX ADMIN — BREXPIPRAZOLE 1 MG: 1 TABLET ORAL at 21:27

## 2019-10-18 RX ADMIN — Medication 0.5 MG: at 17:02

## 2019-10-18 RX ADMIN — FAMOTIDINE 20 MG: 20 TABLET, FILM COATED ORAL at 21:27

## 2019-10-18 RX ADMIN — Medication 0.2 MG: at 14:42

## 2019-10-18 RX ADMIN — HYDROMORPHONE HYDROCHLORIDE 0.5 MG: 1 INJECTION, SOLUTION INTRAMUSCULAR; INTRAVENOUS; SUBCUTANEOUS at 17:02

## 2019-10-18 RX ADMIN — ONDANSETRON 4 MG: 2 INJECTION, SOLUTION INTRAMUSCULAR; INTRAVENOUS at 15:58

## 2019-10-18 RX ADMIN — ESTROGENS, CONJUGATED 2.5 MG: 1.25 TABLET, FILM COATED ORAL at 21:27

## 2019-10-18 RX ADMIN — QUETIAPINE FUMARATE 50 MG: 25 TABLET ORAL at 21:26

## 2019-10-18 RX ADMIN — ALBUTEROL SULFATE 2 PUFF: 90 AEROSOL, METERED RESPIRATORY (INHALATION) at 16:02

## 2019-10-18 RX ADMIN — ESCITALOPRAM OXALATE 20 MG: 10 TABLET ORAL at 21:27

## 2019-10-18 RX ADMIN — ROCURONIUM BROMIDE 50 MG: 10 INJECTION INTRAVENOUS at 14:44

## 2019-10-18 RX ADMIN — ALBUTEROL SULFATE 2 PUFF: 90 AEROSOL, METERED RESPIRATORY (INHALATION) at 14:50

## 2019-10-18 RX ADMIN — DOCUSATE SODIUM 100 MG: 100 CAPSULE, LIQUID FILLED ORAL at 21:27

## 2019-10-18 RX ADMIN — SODIUM CHLORIDE, POTASSIUM CHLORIDE, SODIUM LACTATE AND CALCIUM CHLORIDE: 600; 310; 30; 20 INJECTION, SOLUTION INTRAVENOUS at 16:04

## 2019-10-18 RX ADMIN — Medication 3 MG: at 16:06

## 2019-10-18 RX ADMIN — LIDOCAINE HYDROCHLORIDE 50 MG: 10 INJECTION, SOLUTION EPIDURAL; INFILTRATION; INTRACAUDAL; PERINEURAL at 14:44

## 2019-10-18 RX ADMIN — DEXAMETHASONE SODIUM PHOSPHATE 10 MG: 10 INJECTION INTRAMUSCULAR; INTRAVENOUS at 14:57

## 2019-10-18 RX ADMIN — MIDAZOLAM HYDROCHLORIDE 2 MG: 1 INJECTION, SOLUTION INTRAMUSCULAR; INTRAVENOUS at 14:39

## 2019-10-18 RX ADMIN — CEFAZOLIN 2000 MG: 1 INJECTION, POWDER, FOR SOLUTION INTRAMUSCULAR; INTRAVENOUS at 14:49

## 2019-10-18 RX ADMIN — SODIUM CHLORIDE: 9 INJECTION, SOLUTION INTRAVENOUS at 21:29

## 2019-10-18 RX ADMIN — SODIUM CHLORIDE, POTASSIUM CHLORIDE, SODIUM LACTATE AND CALCIUM CHLORIDE: 600; 310; 30; 20 INJECTION, SOLUTION INTRAVENOUS at 12:45

## 2019-10-18 RX ADMIN — PROPOFOL 200 MG: 10 INJECTION, EMULSION INTRAVENOUS at 14:44

## 2019-10-18 RX ADMIN — Medication 1 G: at 21:27

## 2019-10-18 RX ADMIN — HYDROCODONE BITARTRATE AND ACETAMINOPHEN 2 TABLET: 5; 325 TABLET ORAL at 20:26

## 2019-10-18 RX ADMIN — OXCARBAZEPINE 600 MG: 300 TABLET, FILM COATED ORAL at 21:26

## 2019-10-18 RX ADMIN — FENTANYL CITRATE 50 MCG: 50 INJECTION INTRAMUSCULAR; INTRAVENOUS at 15:31

## 2019-10-18 ASSESSMENT — PULMONARY FUNCTION TESTS
PIF_VALUE: 22
PIF_VALUE: 3
PIF_VALUE: 25
PIF_VALUE: 25
PIF_VALUE: 27
PIF_VALUE: 25
PIF_VALUE: 2
PIF_VALUE: 25
PIF_VALUE: 27
PIF_VALUE: 22
PIF_VALUE: 27
PIF_VALUE: 27
PIF_VALUE: 0
PIF_VALUE: 12
PIF_VALUE: 25
PIF_VALUE: 24
PIF_VALUE: 1
PIF_VALUE: 23
PIF_VALUE: 27
PIF_VALUE: 26
PIF_VALUE: 25
PIF_VALUE: 21
PIF_VALUE: 27
PIF_VALUE: 28
PIF_VALUE: 52
PIF_VALUE: 4
PIF_VALUE: 5
PIF_VALUE: 27
PIF_VALUE: 27
PIF_VALUE: 6
PIF_VALUE: 27
PIF_VALUE: 24
PIF_VALUE: 25
PIF_VALUE: 6
PIF_VALUE: 25
PIF_VALUE: 24
PIF_VALUE: 27
PIF_VALUE: 24
PIF_VALUE: 25
PIF_VALUE: 25
PIF_VALUE: 27
PIF_VALUE: 25
PIF_VALUE: 24
PIF_VALUE: 1
PIF_VALUE: 27
PIF_VALUE: 25
PIF_VALUE: 27
PIF_VALUE: 27
PIF_VALUE: 3
PIF_VALUE: 24
PIF_VALUE: 25
PIF_VALUE: 26
PIF_VALUE: 16
PIF_VALUE: 0
PIF_VALUE: 25
PIF_VALUE: 7
PIF_VALUE: 24
PIF_VALUE: 25
PIF_VALUE: 5
PIF_VALUE: 27
PIF_VALUE: 24
PIF_VALUE: 24
PIF_VALUE: 4
PIF_VALUE: 23
PIF_VALUE: 6
PIF_VALUE: 25
PIF_VALUE: 27
PIF_VALUE: 25
PIF_VALUE: 25
PIF_VALUE: 0
PIF_VALUE: 22
PIF_VALUE: 38
PIF_VALUE: 27
PIF_VALUE: 1
PIF_VALUE: 26
PIF_VALUE: 2
PIF_VALUE: 3
PIF_VALUE: 4
PIF_VALUE: 27
PIF_VALUE: 22
PIF_VALUE: 25
PIF_VALUE: 25
PIF_VALUE: 3
PIF_VALUE: 27
PIF_VALUE: 27
PIF_VALUE: 25
PIF_VALUE: 22
PIF_VALUE: 25
PIF_VALUE: 25
PIF_VALUE: 0
PIF_VALUE: 7
PIF_VALUE: 8
PIF_VALUE: 12
PIF_VALUE: 25
PIF_VALUE: 1
PIF_VALUE: 27
PIF_VALUE: 3
PIF_VALUE: 25
PIF_VALUE: 23
PIF_VALUE: 1
PIF_VALUE: 2
PIF_VALUE: 25
PIF_VALUE: 23
PIF_VALUE: 35
PIF_VALUE: 27
PIF_VALUE: 25
PIF_VALUE: 24
PIF_VALUE: 23
PIF_VALUE: 16
PIF_VALUE: 22
PIF_VALUE: 0
PIF_VALUE: 25
PIF_VALUE: 4
PIF_VALUE: 1
PIF_VALUE: 24

## 2019-10-18 ASSESSMENT — PAIN SCALES - GENERAL
PAINLEVEL_OUTOF10: 8
PAINLEVEL_OUTOF10: 10
PAINLEVEL_OUTOF10: 6

## 2019-10-18 ASSESSMENT — PAIN SCALES - WONG BAKER
WONGBAKER_NUMERICALRESPONSE: 0

## 2019-10-19 VITALS
RESPIRATION RATE: 16 BRPM | HEIGHT: 65 IN | HEART RATE: 66 BPM | WEIGHT: 168.87 LBS | TEMPERATURE: 98.6 F | OXYGEN SATURATION: 94 % | DIASTOLIC BLOOD PRESSURE: 68 MMHG | SYSTOLIC BLOOD PRESSURE: 106 MMHG | BODY MASS INDEX: 28.14 KG/M2

## 2019-10-19 LAB
ANION GAP SERPL CALCULATED.3IONS-SCNC: 9 MMOL/L (ref 9–17)
BUN BLDV-MCNC: 14 MG/DL (ref 6–20)
BUN/CREAT BLD: ABNORMAL (ref 9–20)
CALCIUM SERPL-MCNC: 8.1 MG/DL (ref 8.6–10.4)
CHLORIDE BLD-SCNC: 102 MMOL/L (ref 98–107)
CO2: 21 MMOL/L (ref 20–31)
CREAT SERPL-MCNC: 0.35 MG/DL (ref 0.5–0.9)
CULTURE: NO GROWTH
GFR AFRICAN AMERICAN: >60 ML/MIN
GFR NON-AFRICAN AMERICAN: >60 ML/MIN
GFR SERPL CREATININE-BSD FRML MDRD: ABNORMAL ML/MIN/{1.73_M2}
GFR SERPL CREATININE-BSD FRML MDRD: ABNORMAL ML/MIN/{1.73_M2}
GLUCOSE BLD-MCNC: 116 MG/DL (ref 70–99)
HCT VFR BLD CALC: 37.4 % (ref 36.3–47.1)
HEMOGLOBIN: 12.1 G/DL (ref 11.9–15.1)
Lab: NORMAL
MCH RBC QN AUTO: 30.9 PG (ref 25.2–33.5)
MCHC RBC AUTO-ENTMCNC: 32.4 G/DL (ref 28.4–34.8)
MCV RBC AUTO: 95.4 FL (ref 82.6–102.9)
NRBC AUTOMATED: 0 PER 100 WBC
PDW BLD-RTO: 12.2 % (ref 11.8–14.4)
PLATELET # BLD: 322 K/UL (ref 138–453)
PMV BLD AUTO: 8.7 FL (ref 8.1–13.5)
POTASSIUM SERPL-SCNC: 4.7 MMOL/L (ref 3.7–5.3)
RBC # BLD: 3.92 M/UL (ref 3.95–5.11)
SODIUM BLD-SCNC: 132 MMOL/L (ref 135–144)
SPECIMEN DESCRIPTION: NORMAL
WBC # BLD: 16 K/UL (ref 3.5–11.3)

## 2019-10-19 PROCEDURE — G0378 HOSPITAL OBSERVATION PER HR: HCPCS

## 2019-10-19 PROCEDURE — 6370000000 HC RX 637 (ALT 250 FOR IP): Performed by: STUDENT IN AN ORGANIZED HEALTH CARE EDUCATION/TRAINING PROGRAM

## 2019-10-19 PROCEDURE — 96374 THER/PROPH/DIAG INJ IV PUSH: CPT

## 2019-10-19 PROCEDURE — 6360000002 HC RX W HCPCS: Performed by: STUDENT IN AN ORGANIZED HEALTH CARE EDUCATION/TRAINING PROGRAM

## 2019-10-19 PROCEDURE — 80048 BASIC METABOLIC PNL TOTAL CA: CPT

## 2019-10-19 PROCEDURE — 85027 COMPLETE CBC AUTOMATED: CPT

## 2019-10-19 PROCEDURE — 36415 COLL VENOUS BLD VENIPUNCTURE: CPT

## 2019-10-19 RX ORDER — CEPHALEXIN 500 MG/1
500 CAPSULE ORAL 4 TIMES DAILY
Qty: 12 CAPSULE | Refills: 0 | Status: SHIPPED | OUTPATIENT
Start: 2019-10-19 | End: 2019-10-22

## 2019-10-19 RX ORDER — MORPHINE SULFATE 2 MG/ML
2 INJECTION, SOLUTION INTRAMUSCULAR; INTRAVENOUS EVERY 4 HOURS PRN
Status: DISCONTINUED | OUTPATIENT
Start: 2019-10-19 | End: 2019-10-19 | Stop reason: HOSPADM

## 2019-10-19 RX ORDER — HYDROCODONE BITARTRATE AND ACETAMINOPHEN 5; 325 MG/1; MG/1
1 TABLET ORAL EVERY 4 HOURS PRN
Qty: 20 TABLET | Refills: 0 | Status: SHIPPED | OUTPATIENT
Start: 2019-10-19 | End: 2019-10-24

## 2019-10-19 RX ORDER — DOCUSATE SODIUM 100 MG/1
100 CAPSULE, LIQUID FILLED ORAL DAILY
Qty: 30 CAPSULE | Refills: 0 | Status: SHIPPED | OUTPATIENT
Start: 2019-10-19 | End: 2019-11-18

## 2019-10-19 RX ADMIN — BREXPIPRAZOLE 1 MG: 1 TABLET ORAL at 08:21

## 2019-10-19 RX ADMIN — DOCUSATE SODIUM 100 MG: 100 CAPSULE, LIQUID FILLED ORAL at 08:21

## 2019-10-19 RX ADMIN — BUSPIRONE HYDROCHLORIDE 15 MG: 15 TABLET ORAL at 08:21

## 2019-10-19 RX ADMIN — ESTROGENS, CONJUGATED 2.5 MG: 1.25 TABLET, FILM COATED ORAL at 08:22

## 2019-10-19 RX ADMIN — MORPHINE SULFATE 2 MG: 2 INJECTION, SOLUTION INTRAMUSCULAR; INTRAVENOUS at 03:11

## 2019-10-19 RX ADMIN — Medication 1 G: at 06:05

## 2019-10-19 RX ADMIN — FAMOTIDINE 20 MG: 20 TABLET, FILM COATED ORAL at 08:21

## 2019-10-19 RX ADMIN — HYDROCODONE BITARTRATE AND ACETAMINOPHEN 2 TABLET: 5; 325 TABLET ORAL at 00:04

## 2019-10-19 RX ADMIN — OXCARBAZEPINE 600 MG: 300 TABLET, FILM COATED ORAL at 08:21

## 2019-10-19 ASSESSMENT — PAIN SCALES - GENERAL
PAINLEVEL_OUTOF10: 0
PAINLEVEL_OUTOF10: 4
PAINLEVEL_OUTOF10: 6
PAINLEVEL_OUTOF10: 8
PAINLEVEL_OUTOF10: 6
PAINLEVEL_OUTOF10: 0

## 2019-10-23 ENCOUNTER — TELEPHONE (OUTPATIENT)
Dept: UROLOGY | Age: 24
End: 2019-10-23

## 2019-10-23 DIAGNOSIS — N20.0 KIDNEY STONE: Primary | ICD-10-CM

## 2020-04-13 ENCOUNTER — TELEPHONE (OUTPATIENT)
Dept: UROLOGY | Age: 25
End: 2020-04-13

## 2020-04-13 NOTE — TELEPHONE ENCOUNTER
Left voicemail for patient. OV has been changed to e-visit. Patient told to call back if this does not work for her. appt has been changed. Patient will need to accept disclaimer.

## 2022-01-18 ENCOUNTER — APPOINTMENT (OUTPATIENT)
Dept: CT IMAGING | Age: 27
End: 2022-01-18
Payer: COMMERCIAL

## 2022-01-18 ENCOUNTER — HOSPITAL ENCOUNTER (EMERGENCY)
Age: 27
Discharge: HOME | End: 2022-01-18
Attending: EMERGENCY MEDICINE
Payer: COMMERCIAL

## 2022-01-18 ENCOUNTER — OFFICE VISIT (OUTPATIENT)
Dept: NEUROLOGY | Age: 27
End: 2022-01-18
Payer: COMMERCIAL

## 2022-01-18 ENCOUNTER — TELEPHONE (OUTPATIENT)
Dept: NEUROLOGY | Age: 27
End: 2022-01-18

## 2022-01-18 VITALS
SYSTOLIC BLOOD PRESSURE: 111 MMHG | TEMPERATURE: 98.6 F | RESPIRATION RATE: 17 BRPM | HEIGHT: 60 IN | HEART RATE: 91 BPM | DIASTOLIC BLOOD PRESSURE: 73 MMHG | BODY MASS INDEX: 38.48 KG/M2 | OXYGEN SATURATION: 98 % | WEIGHT: 196 LBS

## 2022-01-18 VITALS
WEIGHT: 196 LBS | HEART RATE: 78 BPM | HEIGHT: 60 IN | BODY MASS INDEX: 38.48 KG/M2 | DIASTOLIC BLOOD PRESSURE: 89 MMHG | SYSTOLIC BLOOD PRESSURE: 126 MMHG

## 2022-01-18 DIAGNOSIS — G40.909 SEIZURE DISORDER (HCC): Primary | ICD-10-CM

## 2022-01-18 LAB
-: ABNORMAL
ABSOLUTE EOS #: 0.2 K/UL (ref 0–0.4)
ABSOLUTE IMMATURE GRANULOCYTE: ABNORMAL K/UL (ref 0–0.3)
ABSOLUTE LYMPH #: 2.5 K/UL (ref 1–4.8)
ABSOLUTE MONO #: 0.6 K/UL (ref 0.1–1.2)
AMORPHOUS: ABNORMAL
AMPHETAMINE SCREEN URINE: NEGATIVE
ANION GAP SERPL CALCULATED.3IONS-SCNC: 23 MMOL/L (ref 9–17)
BACTERIA: ABNORMAL
BARBITURATE SCREEN URINE: NEGATIVE
BASOPHILS # BLD: 1 % (ref 0–2)
BASOPHILS ABSOLUTE: 0.1 K/UL (ref 0–0.2)
BENZODIAZEPINE SCREEN, URINE: POSITIVE
BILIRUBIN URINE: NEGATIVE
BUN BLDV-MCNC: 10 MG/DL (ref 6–20)
BUN/CREAT BLD: ABNORMAL (ref 9–20)
BUPRENORPHINE URINE: ABNORMAL
CALCIUM SERPL-MCNC: 10.2 MG/DL (ref 8.6–10.4)
CANNABINOID SCREEN URINE: POSITIVE
CASTS UA: ABNORMAL /LPF
CHLORIDE BLD-SCNC: 99 MMOL/L (ref 98–107)
CO2: 17 MMOL/L (ref 20–31)
COCAINE METABOLITE, URINE: NEGATIVE
COLOR: YELLOW
COMMENT UA: ABNORMAL
CREAT SERPL-MCNC: 0.63 MG/DL (ref 0.5–0.9)
CRYSTALS, UA: ABNORMAL /HPF
DIFFERENTIAL TYPE: ABNORMAL
EOSINOPHILS RELATIVE PERCENT: 2 % (ref 1–4)
EPITHELIAL CELLS UA: ABNORMAL /HPF (ref 0–5)
GFR AFRICAN AMERICAN: >60 ML/MIN
GFR NON-AFRICAN AMERICAN: >60 ML/MIN
GFR SERPL CREATININE-BSD FRML MDRD: ABNORMAL ML/MIN/{1.73_M2}
GFR SERPL CREATININE-BSD FRML MDRD: ABNORMAL ML/MIN/{1.73_M2}
GLUCOSE BLD-MCNC: 108 MG/DL (ref 70–99)
GLUCOSE URINE: NEGATIVE
HCG QUALITATIVE: NEGATIVE
HCT VFR BLD CALC: 43.9 % (ref 36–46)
HEMOGLOBIN: 14.8 G/DL (ref 12–16)
IMMATURE GRANULOCYTES: ABNORMAL %
KETONES, URINE: NEGATIVE
LEUKOCYTE ESTERASE, URINE: ABNORMAL
LYMPHOCYTES # BLD: 29 % (ref 24–44)
MCH RBC QN AUTO: 29.2 PG (ref 26–34)
MCHC RBC AUTO-ENTMCNC: 33.7 G/DL (ref 31–37)
MCV RBC AUTO: 86.6 FL (ref 80–100)
MDMA URINE: ABNORMAL
METHADONE SCREEN, URINE: NEGATIVE
METHAMPHETAMINE, URINE: ABNORMAL
MONOCYTES # BLD: 7 % (ref 2–11)
MUCUS: ABNORMAL
NITRITE, URINE: NEGATIVE
NRBC AUTOMATED: ABNORMAL PER 100 WBC
OPIATES, URINE: NEGATIVE
OTHER OBSERVATIONS UA: ABNORMAL
OXYCODONE SCREEN URINE: NEGATIVE
PDW BLD-RTO: 12.9 % (ref 12.5–15.4)
PH UA: 7 (ref 5–8)
PHENCYCLIDINE, URINE: NEGATIVE
PLATELET # BLD: 458 K/UL (ref 140–450)
PLATELET ESTIMATE: ABNORMAL
PMV BLD AUTO: 6.6 FL (ref 6–12)
POTASSIUM SERPL-SCNC: 4.3 MMOL/L (ref 3.7–5.3)
PROPOXYPHENE, URINE: ABNORMAL
PROTEIN UA: NEGATIVE
RBC # BLD: 5.07 M/UL (ref 4–5.2)
RBC # BLD: ABNORMAL 10*6/UL
RBC UA: ABNORMAL /HPF (ref 0–2)
RENAL EPITHELIAL, UA: ABNORMAL /HPF
SEG NEUTROPHILS: 61 % (ref 36–66)
SEGMENTED NEUTROPHILS ABSOLUTE COUNT: 5.3 K/UL (ref 1.8–7.7)
SODIUM BLD-SCNC: 139 MMOL/L (ref 135–144)
SPECIFIC GRAVITY UA: 1.01 (ref 1–1.03)
TEST INFORMATION: ABNORMAL
TRICHOMONAS: ABNORMAL
TRICYCLIC ANTIDEPRESSANTS, UR: ABNORMAL
TURBIDITY: CLEAR
URINE HGB: ABNORMAL
UROBILINOGEN, URINE: NORMAL
WBC # BLD: 8.7 K/UL (ref 3.5–11)
WBC # BLD: ABNORMAL 10*3/UL
WBC UA: ABNORMAL /HPF (ref 0–5)
YEAST: ABNORMAL

## 2022-01-18 PROCEDURE — 80307 DRUG TEST PRSMV CHEM ANLYZR: CPT

## 2022-01-18 PROCEDURE — 99284 EMERGENCY DEPT VISIT MOD MDM: CPT

## 2022-01-18 PROCEDURE — 80048 BASIC METABOLIC PNL TOTAL CA: CPT

## 2022-01-18 PROCEDURE — 6360000002 HC RX W HCPCS: Performed by: EMERGENCY MEDICINE

## 2022-01-18 PROCEDURE — 99205 OFFICE O/P NEW HI 60 MIN: CPT | Performed by: STUDENT IN AN ORGANIZED HEALTH CARE EDUCATION/TRAINING PROGRAM

## 2022-01-18 PROCEDURE — 70450 CT HEAD/BRAIN W/O DYE: CPT

## 2022-01-18 PROCEDURE — 85025 COMPLETE CBC W/AUTO DIFF WBC: CPT

## 2022-01-18 PROCEDURE — 96376 TX/PRO/DX INJ SAME DRUG ADON: CPT

## 2022-01-18 PROCEDURE — 96374 THER/PROPH/DIAG INJ IV PUSH: CPT

## 2022-01-18 PROCEDURE — 81001 URINALYSIS AUTO W/SCOPE: CPT

## 2022-01-18 PROCEDURE — 36415 COLL VENOUS BLD VENIPUNCTURE: CPT

## 2022-01-18 PROCEDURE — 84703 CHORIONIC GONADOTROPIN ASSAY: CPT

## 2022-01-18 PROCEDURE — 80183 DRUG SCRN QUANT OXCARBAZEPIN: CPT

## 2022-01-18 RX ORDER — CLOBAZAM 10 MG/1
10 TABLET ORAL DAILY
Qty: 30 TABLET | Refills: 0 | Status: SHIPPED | OUTPATIENT
Start: 2022-01-18 | End: 2022-02-14 | Stop reason: SDUPTHER

## 2022-01-18 RX ORDER — LORAZEPAM 2 MG/ML
1 INJECTION INTRAMUSCULAR ONCE
Status: COMPLETED | OUTPATIENT
Start: 2022-01-18 | End: 2022-01-18

## 2022-01-18 RX ORDER — OXCARBAZEPINE 300 MG/1
600 TABLET, FILM COATED ORAL 2 TIMES DAILY
Qty: 60 TABLET | Refills: 3 | Status: SHIPPED | OUTPATIENT
Start: 2022-01-18 | End: 2022-01-18 | Stop reason: SDUPTHER

## 2022-01-18 RX ORDER — ESTRADIOL 0.1 MG/D
1 FILM, EXTENDED RELEASE TRANSDERMAL
COMMUNITY
Start: 2022-01-11

## 2022-01-18 RX ORDER — OXCARBAZEPINE 300 MG/1
600 TABLET, FILM COATED ORAL 2 TIMES DAILY
Qty: 56 TABLET | Refills: 0 | Status: SHIPPED | OUTPATIENT
Start: 2022-01-18 | End: 2022-02-14 | Stop reason: SDUPTHER

## 2022-01-18 RX ADMIN — LORAZEPAM 1 MG: 2 INJECTION INTRAMUSCULAR; INTRAVENOUS at 15:03

## 2022-01-18 RX ADMIN — LORAZEPAM 1 MG: 2 INJECTION INTRAMUSCULAR; INTRAVENOUS at 15:53

## 2022-01-18 ASSESSMENT — PAIN SCALES - GENERAL: PAINLEVEL_OUTOF10: 10

## 2022-01-18 ASSESSMENT — PAIN DESCRIPTION - PAIN TYPE: TYPE: ACUTE PAIN

## 2022-01-18 ASSESSMENT — ENCOUNTER SYMPTOMS
GASTROINTESTINAL NEGATIVE: 1
RESPIRATORY NEGATIVE: 1
EYES NEGATIVE: 1

## 2022-01-18 ASSESSMENT — PAIN DESCRIPTION - LOCATION: LOCATION: GENERALIZED

## 2022-01-18 NOTE — ED NOTES
Bed: 2TRAUMA  Expected date:   Expected time:   Means of arrival:   Comments:  COVID clean, pt left @ Mac Cary RN  01/18/22 8311

## 2022-01-18 NOTE — ED PROVIDER NOTES
[address] [facility]  [address] [place] [address]  Phone: [phone]  Fax: Wayne Oneil 112      Pt Name: Christianne Finney  MRN: [id number]  [place] 1995  Date of evaluation: 1/18/2022    CHIEF COMPLAINT       Chief Complaint   Patient presents with    Seizures       HISTORY OF PRESENT ILLNESS    Christianne Finney is a 27 y.o. female who presents to the emergency department brought from neurology where she was being established as a new patient for seizures. Patient began having seizures in the waiting room brought to the emergency department here. Patient on arrival following commands but screaming and groaning and generally shaking. Able to focus on people who are talking to her and follow commands but then will lock up and hold her breath for a few seconds and then releasing it and relaxing. Patient has not been on her [address] for the past 2 weeks    REVIEW OF SYSTEMS     Unable to assess secondary to patient's condition  PAST MEDICAL HISTORY    has a past medical history of ADD (attention deficit disorder), Anxiety, Claustrophobia, Depression, Fear of, H/O nephrolithotomy with removal of calculi, Hydrocephalus ([address] Hydronephrosis, Intermittent self-catheterization of bladder, Internal tibial torsion, Kidney stone, MDRO (multiple drug resistant organisms) resistance, Mitrofanoff appendicovesicostomy present ([address] Myelomeningocele ([address] Neurogenic bladder, PTSD (post-traumatic stress disorder), Pyelonephritis, Seizure disorder ([address] Self-catheterizes urinary bladder, Sleep apnea, and Spina bifida ([address] SURGICAL HISTORY      has a past surgical history that includes Ventriculoperitoneal shunt (9/12/93; revised 3/16/96; 1/94; 9/93; programmable shunt 4/08);  Bladder surgery (7/99 S/P urethral lengehtening and reimplantation with  bladder augmentation; revised 1/00); osteotomy (4/00, 12/94 bilateral distal tibial/fibula fib derotational osteotomy); other surgical history (7/01 Peabody tendon transfer); Growth plate surgery (66/08, stapled growth plate bilateral knees); osteotomy (Left, 2007); Hysterectomy, vaginal (02/10/2014); back surgery (06/10/2014); Cystoscopy (11/4/14); Vaginal prolapse repair (07/2016); Cystocopy (09/23/2019); Cystoscopy (Right, 9/23/2019); Cystoscopy (Right, 10/02/2019); Nephrostomy (Right, 10/02/2019); cysto/uretero/pyeloscopy, calculus tx (Right, 10/2/2019); and NEPHROLITHOTOMY (Right, 10/18/2019). CURRENT MEDICATIONS       Previous Medications    BREXPIPRAZOLE (REXULTI) 1 MG TABS TABLET    Take 1 mg by mouth daily    BUSPIRONE (BUSPAR) 15 MG TABLET    Take 15 mg by mouth 2 times daily    CETIRIZINE (ZYRTEC) 10 MG TABLET    Take 10 mg by mouth daily as needed     ESCITALOPRAM (LEXAPRO) 20 MG TABLET    Take 1 tablet by mouth daily    ESTRADIOL (VIVELLE) 0.1 MG/24HR    Place 1 patch onto the skin Twice a Week     ESTROGENS, CONJUGATED, (PREMARIN) 1.25 MG TABLET    Take 2.5 mg by mouth daily     FAMOTIDINE (PEPCID) 20 MG TABLET    take 1 tablet by mouth twice a day    IBUPROFEN (ADVIL;MOTRIN) 800 MG TABLET    Take 1 tablet by mouth every 8 hours as needed (pain). NONFORMULARY    Take 10 mg by mouth 2 times daily Pt states takes ONSI     ONDANSETRON (ZOFRAN-ODT) 4 MG DISINTEGRATING TABLET    Take 4 mg by mouth every 8 hours as needed for Nausea or Vomiting    PRAZOSIN (MINIPRESS) 1 MG CAPSULE    Take 1 mg by mouth 2 times daily Indications: ON HOLD PER DR Rock Cerda     QUETIAPINE (SEROQUEL) 25 MG TABLET    Take 200 mg by mouth nightly        ALLERGIES     is allergic to latex, furadantin [nitrofurantoin], banana, kiwi extract, phenobarbital, and sulfa antibiotics. FAMILY HISTORY     She indicated that her mother is alive. She indicated that her father is alive. family history includes High Blood Pressure in her father and mother; Other in her mother. SOCIAL HISTORY      reports that she has been smoking cigarettes.  She has a 1.50 pack-year smoking history. She has never used smokeless tobacco. She reports current alcohol use. She reports that she does not use drugs. PHYSICAL EXAM       ED Triage Vitals   /88   Pulse 98   Temp 98.6 °F (37 °C) (Oral)   Resp 17   Ht 5' (1.524 m)   Wt 88.9 kg (196 lb)   LMP 02/06/2012   SpO2 96%   BMI 38.28 kg/m²     Constitutional: Alert, shaking but following commands tachypneic   HEENT: Extraocular muscles intact, mucus membranes moist, tearful pupils equal, round, reactive to light, 5 mm and reactive  Neck: Trachea midline   Cardiovascular: Regular rhythm and rate no murmurs   Respiratory: Clear to auscultation bilaterally positive tachypnea  Gastrointestinal: Soft, nontender, nondistended, positive bowel sounds. No rebound, rigidity, or guarding. Musculoskeletal: No extremity pain or swelling   Neurologic: Shaking but able to move all 4 extremities  Skin: Warm and dry       DIFFERENTIAL DIAGNOSIS/ MDM:     IV labs. CT head. Urine tox. Trileptal level.     DIAGNOSTIC RESULTS     EKG: All EKG's are interpreted by the Emergency Department Physician who either signs or Co-signs this chart in the absence of a cardiologist.        Not indicated unless otherwise documented above    LABS:  Results for orders placed or performed during the hospital encounter of 01/18/22   CBC Auto Differential   Result Value Ref Range    WBC 8.7 3.5 - 11.0 k/uL    RBC 5.07 4.0 - 5.2 m/uL    Hemoglobin 14.8 12.0 - 16.0 g/dL    Hematocrit 43.9 36 - 46 %    MCV 86.6 80 - 100 fL    MCH 29.2 26 - 34 pg    MCHC 33.7 31 - 37 g/dL    RDW 12.9 12.5 - 15.4 %    Platelets 341 (H) 339 - 450 k/uL    MPV 6.6 6.0 - 12.0 fL    NRBC Automated NOT REPORTED per 100 WBC    Differential Type NOT REPORTED     Seg Neutrophils 61 36 - 66 %    Lymphocytes 29 24 - 44 %    Monocytes 7 2 - 11 %    Eosinophils % 2 1 - 4 %    Basophils 1 0 - 2 %    Immature Granulocytes NOT REPORTED 0 %    Segs Absolute 5.30 1.8 - 7.7 k/uL    Absolute Lymph # 2.50 1.0 - 4.8 k/uL    Absolute Mono # 0.60 0.1 - 1.2 k/uL    Absolute Eos # 0.20 0.0 - 0.4 k/uL    Basophils Absolute 0.10 0.0 - 0.2 k/uL    Absolute Immature Granulocyte NOT REPORTED 0.00 - 0.30 k/uL    WBC Morphology NOT REPORTED     RBC Morphology NOT REPORTED     Platelet Estimate NOT REPORTED    Basic Metabolic Panel   Result Value Ref Range    Glucose 108 (H) 70 - 99 mg/dL    BUN 10 6 - 20 mg/dL    CREATININE 0.63 0.50 - 0.90 mg/dL    Bun/Cre Ratio NOT REPORTED 9 - 20    Calcium 10.2 8.6 - 10.4 mg/dL    Sodium 139 135 - 144 mmol/L    Potassium 4.3 3.7 - 5.3 mmol/L    Chloride 99 98 - 107 mmol/L    CO2 17 (L) 20 - 31 mmol/L    Anion Gap 23 (H) 9 - 17 mmol/L    GFR Non-African American >60 >60 mL/min    GFR African American >60 >60 mL/min    GFR Comment          GFR Staging NOT REPORTED    Urinalysis Reflex to Culture    Specimen: Urine, clean catch   Result Value Ref Range    Color, UA Yellow Yellow    Turbidity UA Clear Clear    Glucose, Ur NEGATIVE NEGATIVE    Bilirubin Urine NEGATIVE NEGATIVE    Ketones, Urine NEGATIVE NEGATIVE    Specific Gravity, UA 1.015 1.005 - 1.030    Urine Hgb MODERATE (A) NEGATIVE    pH, UA 7.0 5.0 - 8.0    Protein, UA NEGATIVE NEGATIVE    Urobilinogen, Urine Normal Normal    Nitrite, Urine NEGATIVE NEGATIVE    Leukocyte Esterase, Urine TRACE (A) NEGATIVE    Urinalysis Comments NOT REPORTED    HCG Qualitative, Serum   Result Value Ref Range    hCG Qual NEGATIVE NEGATIVE   Urine Drug Screen   Result Value Ref Range    Amphetamine Screen, Ur NEGATIVE NEGATIVE    Barbiturate Screen, Ur NEGATIVE NEGATIVE    Benzodiazepine Screen, Urine POSITIVE (A) NEGATIVE    Cocaine Metabolite, Urine NEGATIVE NEGATIVE    Methadone Screen, Urine NEGATIVE NEGATIVE    Opiates, Urine NEGATIVE NEGATIVE    Phencyclidine, Urine NEGATIVE NEGATIVE    Propoxyphene, Urine NOT REPORTED NEGATIVE    Cannabinoid Scrn, Ur POSITIVE (A) NEGATIVE    Oxycodone Screen, Ur NEGATIVE NEGATIVE    Methamphetamine, Urine NOT REPORTED NEGATIVE    Tricyclic Antidepressants, Urine NOT REPORTED NEGATIVE    MDMA, Urine NOT REPORTED NEGATIVE    Buprenorphine Urine NOT REPORTED NEGATIVE    Test Information       Assay provides medical screening only. The absence of expected drug(s) and/or metabolite(s) may indicate diluted or adulterated urine, limitations of testing or timing of collection. Microscopic Urinalysis   Result Value Ref Range    -          WBC, UA 2 TO 5 0 - 5 /HPF    RBC, UA 10 TO 20 0 - 2 /HPF    Casts UA NOT REPORTED /LPF    Crystals, UA NOT REPORTED None /HPF    Epithelial Cells UA 0 TO 2 0 - 5 /HPF    Renal Epithelial, UA NOT REPORTED 0 /HPF    Bacteria, UA FEW (A) None    Mucus, UA NOT REPORTED None    Trichomonas, UA NOT REPORTED None    Amorphous, UA NOT REPORTED None    Other Observations UA (A) NOT REQ. Utilizing a urinalysis as the only screening method to exclude a potential uropathogen can be unreliable in many patient populations. Rapid screening tests are less sensitive than culture and if UTI is a clinical possibility, culture should be considered despite a negative urinalysis. Yeast, UA NOT REPORTED None       Not indicated unless otherwise documented above    RADIOLOGY:   I reviewed the radiologist interpretations:    CT HEAD WO CONTRAST   Final Result   No significant change in overall appearance of the ventricular size and   configuration. Stable appearance of the right frontal and left posterior   parietal approach ventriculostomy catheters. No acute intracranial findings present with similar sequelae of Chiari 2   malformation. Not indicated unless otherwise documented above    EMERGENCY DEPARTMENT COURSE:     The patient was given the following medications:  Orders Placed This Encounter   Medications    LORazepam (ATIVAN) injection 1 mg    LORazepam (ATIVAN) injection 1 mg    cloBAZam (ONFI) 10 MG TABS tablet     Sig: Take 1 tablet by mouth daily for 30 days. Dispense:  30 tablet     Refill:  0    OXcarbazepine (TRILEPTAL) 300 MG tablet     Sig: Take 2 tablets by mouth 2 times daily for 14 days     Dispense:  56 tablet     Refill:  0        Vitals:   -------------------------  /73   Pulse 91   Temp 98.6 °F (37 °C) (Oral)   Resp 17   Ht 5' (1.524 m)   Wt 88.9 kg (196 lb)   LMP 02/06/2012   SpO2 98%   BMI 38.28 kg/m²       5:30 PM patient was reevaluated multiple times resting more more comfortably in no acute distress. She did require 1 dose of Ativan following the first. CT unremarkable. Labs normal electrolytes normal kidney function. Urine tox screen positive for benzos and cannabinoid. Normal white blood cell count no anemia. No gross focal neurologic deficits and on final reevaluation she is alert answer questions appropriately without seizure activity. She has been off of her Onfi for the past 2 weeks which is when her symptoms started to progress. She will follow-up with her neurologist call for appointment tomorrow we will write her for a prescription for Onfi nightly like she takes it normally and return if worsening symptoms or any other concerns. The patient understands that at this time there is no evidence for a more malignant underlying process, but also understands that early in the process of an illness or injury, an emergency department workup can be falsely reassuring. Routine discharge counseling was given, and it is understood that worsening, changing or persistent symptoms should prompt an immediate call or follow up with their primary physician or return to the emergency department. The importance of appropriate follow up was also discussed. I have reviewed the disposition diagnosis. I have answered the questions and given discharge instructions. There was voiced understanding of these instructions and no further questions or complaints.     CRITICAL CARE:    None    CONSULTS:    None    PROCEDURES:    None      OARRS Report if indicated             FINAL IMPRESSION      1. Seizure disorder [facility])          DISPOSITION/PLAN   DISPOSITION Decision To Discharge 01/18/2022 05:28:35 PM        CONDITION ON DISPOSITION: STABLE       PATIENT REFERRED TO:  Ashlee Duran MD  [address] [place] [address].  [phone]    Schedule an appointment as soon as possible for a visit in 1 day        DISCHARGE MEDICATIONS:  New Prescriptions    CLOBAZAM (ONFI) 10 MG TABS TABLET    Take 1 tablet by mouth daily for 30 days.        (Please note that portions of this note were completed with a voice recognition program.  Efforts were made to edit the dictations but occasionally words are mis-transcribed.)    Troy Houser DO   Attending Emergency Physician      Troy Houser,   01/18/22 [address], DO  01/18/22 1818

## 2022-01-18 NOTE — ED NOTES
Pts father [facility] at [phone] called per pts request      Agata Yan, RN  01/18/22 [address] [address] [address], RN  01/18/22 2708

## 2022-01-18 NOTE — ED NOTES
Pt given written and verbal discharge, f/u instructions with reminder to p/u medications at preferred pharmacy. Pt verbalizes understanding. Pt is ambulatory with steady gait.       Sergei Nash RN  01/18/22 9413

## 2022-01-18 NOTE — PROGRESS NOTES
[address] [place] [address]  Dept: [phone]    PATIENT NAME: Ceci Pierce  PATIENT MRN: [id number]  PRIMARY CARE PHYSICIAN: London Khalil MD    HPI:      Ceci Pierce is a 27 y.o. female who presents to clinic today for evaluation of Seizures     She has a history of seizures, pseudoseizures, bipolar disorder. She was following with Dr. Delma Sanders for her seizures and was last seen in his clinic in 3/2021. She was dismissed from the clinic in June 2021 for multiple missed appointments. She is currently on trileptal and clobazam. Plan at his last clinic visit was to wean Onfi. Patient started having more seizures so patient was put back on Onfi to 10 mg daily. She ran out of Onfi 2 weeks ago and has been only taking half of her trileptal dose. Upon walking into the room, patient notes she feels like she is having a seizure. Patient is sitting in the chair hyperventilating. She is able to answer my questions and follow commands throughout episode. She notes she is very anxious and her anxiety is poorly controlled. She has having personal issues at home which has made her anxiety worse. Patient had two total episodes in clinic. Patient is very tearful and scared. Discussed these are likely due to her anxiety but will talk to her previous neurologist. Will send patient to ED for further evaluation. Typical episode: whole face tingling, clenches jaw, and rolling of eyes back and forth following by generalized shaking. She notes after the episodes she typically screams. Typical episode can be few seconds to 30 min. She notes she feels tired after the episode.      She had a video EEG in 10/2020 which was normal.         PREVIOUS WORKUP:     Lab Results   Component Value Date    WBC 16.0 (H) 10/19/2019    HGB 12.1 10/19/2019    HCT 37.4 10/19/2019    MCV 95.4 10/19/2019     10/19/2019       Past Medical History:   Diagnosis Date    ADD Right 10/02/2019    percutanious    OSTEOTOMY  4/00, 12/94 bilateral distal tibial/fibula fib derotational osteotomy    OSTEOTOMY Left 2007    OTHER SURGICAL HISTORY  7/01 Peabody tendon transfer    VAGINAL PROLAPSE REPAIR  07/2016    VENTRICULOPERITONEAL SHUNT  9/12/93; revised 3/16/96; 1/94; 9/93; programmable shunt 4/08    DR DOLL        Social History     Socioeconomic History    Marital status: Single     Spouse name: Not on file    Number of children: Not on file    Years of education: Not on file    Highest education level: Not on file   Occupational History    Not on file   Tobacco Use    Smoking status: Light Tobacco Smoker     Packs/day: 0.25     Years: 6.00     Pack years: 1.50     Types: Cigarettes    Smokeless tobacco: Never Used    Tobacco comment: QUIT 2013   Substance and Sexual Activity    Alcohol use: Yes     Comment: RARE    Drug use: Never    Sexual activity: Yes     Partners: Male   Other Topics Concern    Not on file   Social History Narrative    Not on file     Social Determinants of Health     Financial Resource Strain:     Difficulty of Paying Living Expenses: Not on file   Food Insecurity:     Worried About Running Out of Food in the Last Year: Not on file    Aníbal of Food in the Last Year: Not on file   Transportation Needs:     Lack of Transportation (Medical): Not on file    Lack of Transportation (Non-Medical):  Not on file   Physical Activity:     Days of Exercise per Week: Not on file    Minutes of Exercise per Session: Not on file   Stress:     Feeling of Stress : Not on file   Social Connections:     Frequency of Communication with Friends and Family: Not on file    Frequency of Social Gatherings with Friends and Family: Not on file    Attends Roman [employer] Services: Not on file    Active Member of Clubs or Organizations: Not on file    Attends Club or Organization Meetings: Not on file    Marital Status: Not on file   Intimate Partner Violence:     Fear of Current or Ex-Partner: Not on file    Emotionally Abused: Not on file    Physically Abused: Not on file    Sexually Abused: Not on file   Housing Stability:     Unable to Pay for Housing in the Last Year: Not on file    Number of Faisal in the Last Year: Not on file    Unstable Housing in the Last Year: Not on file        Current Outpatient Medications   Medication Sig Dispense Refill    NONFORMULARY Take 5 mg by mouth 2 times daily Pt states takes ONSI      brexpiprazole (REXULTI) 1 MG TABS tablet Take 1 mg by mouth daily      estrogens, conjugated, (PREMARIN) 1.25 MG tablet Take 2.5 mg by mouth daily       busPIRone (BUSPAR) 15 MG tablet Take 15 mg by mouth 2 times daily      prazosin (MINIPRESS) 1 MG capsule Take 1 mg by mouth 2 times daily Indications: ON HOLD PER DR Carl desai       escitalopram (LEXAPRO) 20 MG tablet Take 1 tablet by mouth daily      QUEtiapine (SEROQUEL) 25 MG tablet Take 50 mg by mouth nightly      famotidine (PEPCID) 20 MG tablet take 1 tablet by mouth twice a day  0    ondansetron (ZOFRAN-ODT) 4 MG disintegrating tablet Take 4 mg by mouth every 8 hours as needed for Nausea or Vomiting      ibuprofen (ADVIL;MOTRIN) 800 MG tablet Take 1 tablet by mouth every 8 hours as needed (pain). 40 tablet 0    OXcarbazepine (TRILEPTAL) 300 MG tablet Take 600 mg by mouth 2 times daily.  cetirizine (ZYRTEC) 10 MG tablet Take 10 mg by mouth daily as needed        No current facility-administered medications for this visit. Allergies   Allergen Reactions    Latex Rash    Furadantin [Nitrofurantoin] Other (See Comments)     Night tremors and hallucinations    Banana Rash    Kiwi Extract Rash    Phenobarbital Rash    Sulfa Antibiotics Rash        REVIEW OF SYSTEMS:     Review of Systems   Constitutional: Negative. HENT: Negative. Eyes: Negative. Respiratory: Negative. Cardiovascular: Negative. Gastrointestinal: Negative. Endocrine: Negative. Genitourinary: Negative. Musculoskeletal: Negative. Skin: Negative. Neurological: Negative for dizziness, tremors, seizures, syncope, facial asymmetry, speech difficulty, weakness, light-headedness, numbness and headaches. Hematological: Negative. Psychiatric/Behavioral: Negative for sleep disturbance. VITALS  /89 (Site: Left Upper Arm, Position: Sitting, Cuff Size: Medium Adult)   Pulse 78   Ht 5' (1.524 m)   Wt 196 lb (88.9 kg)   LMP 02/06/2012   BMI 38.28 kg/m²          NEUROLOGICAL EXAMINATION:         Mental status    Alert and oriented x 3; intact memory with no confusion, speech or language problems; no hallucinations or delusions  Fund of information appropriate for level of education    Cranial nerves    II - visual fields intact to confrontation  III, IV, VI - extra-ocular muscles full: no pupillary defect; no HEMA, no nystagmus, no ptosis   V - normal facial sensation                                                               VII - normal facial symmetry                                                             VIII - intact hearing                                                                             IX, X - symmetrical palate                                                                  XI - symmetrical shoulder shrug                                                       XII - tongue midline without atrophy or fasciculation      Motor function  Normal muscle bulk and tone; strength 5/5 on all 4 extremities, no pronator drift      Sensory function Intact to light touch, pinprick in all 4 extremities      Cerebellar Intact fine motor movement. Reflex function DTR 2+ on bilateral UE and LE, symmetric. ASSESSMENT / PLAN:   This is a 68-year-old female who presents to establish care for her seizures. She was previously followed by [address] [place] [address] neurology for her seizures as well as her pseudoseizures.   She was taking Trileptal 600 mg twice a day as well as Onfi 10 mg daily. Patient has not been taking Onfi for the past 2 weeks because she says she ran out of the medication and has only been taking 600 mg of the Trileptal.  She notes in the past 2 weeks she has had increased frequency of her seizures. She has been having daily seizures and also notes that her seizures are anxiety related. She has been very stressed and anxious due to personal issues and has not been able to get into see her psychiatrist.  Patient had 2 episodes of what appears to be nonepileptic seizures in clinic today. Discussed with patient that I would reach out to her prior neurologist to gather more information about her medication doses and previous history. Discussed she should be taking Trileptal 600 mg twice a day and resume Onfi. Discussed with patient that since these are due to underlying anxiety that her anxiety is well controlled and she should follow-up with her psychiatrist.  Given patient had 2 episodes in clinic patient was taken down to the emergency department in a wheelchair for further evaluation.          Ron Mcclure MD   Neurology & Sleep Medicine  [facility]

## 2022-01-21 LAB — OXCARBAZEPINE: 14 UG/ML (ref 3–35)

## 2022-02-14 ENCOUNTER — OFFICE VISIT (OUTPATIENT)
Dept: NEUROLOGY | Age: 27
End: 2022-02-14
Payer: COMMERCIAL

## 2022-02-14 VITALS
WEIGHT: 201 LBS | HEART RATE: 86 BPM | BODY MASS INDEX: 39.46 KG/M2 | DIASTOLIC BLOOD PRESSURE: 87 MMHG | HEIGHT: 60 IN | SYSTOLIC BLOOD PRESSURE: 127 MMHG

## 2022-02-14 DIAGNOSIS — G40.909 SEIZURE DISORDER (HCC): ICD-10-CM

## 2022-02-14 PROCEDURE — 99214 OFFICE O/P EST MOD 30 MIN: CPT | Performed by: STUDENT IN AN ORGANIZED HEALTH CARE EDUCATION/TRAINING PROGRAM

## 2022-02-14 RX ORDER — OXCARBAZEPINE 300 MG/1
600 TABLET, FILM COATED ORAL 2 TIMES DAILY
Qty: 360 TABLET | Refills: 4 | Status: SHIPPED | OUTPATIENT
Start: 2022-02-14 | End: 2023-02-28

## 2022-02-14 RX ORDER — CLOBAZAM 10 MG/1
10 TABLET ORAL DAILY
Qty: 30 TABLET | Refills: 3 | Status: SHIPPED | OUTPATIENT
Start: 2022-02-14 | End: 2022-06-20

## 2022-02-14 ASSESSMENT — ENCOUNTER SYMPTOMS
RESPIRATORY NEGATIVE: 1
EYES NEGATIVE: 1
GASTROINTESTINAL NEGATIVE: 1

## 2022-02-14 NOTE — PROGRESS NOTES
[address] [place] [address]  Dept: [phone]    PATIENT NAME: Lucia Duran  PATIENT MRN: [id number]  PRIMARY CARE PHYSICIAN: Criss Neri MD    HPI:      Lucia Duran is a 27 y.o. female who presents to clinic for follow-up  of Seizures     Interim History: At the last clinic visit, patient had two seizures that were witnessed in clinic. Seizures appeared to be nonepileptic in nature. Patient has stopped Onfi 2 weeks prior to the episode because she had run out. She was seen in the emergency department and given Ativan and Hardik Gell was resumed. Since emergency department visit, patient notes that she has had no further seizures. Unable to get records from [address] [place] [address] neurology. We will try to request records again. She has not had any further seizures since last visit. Denies any side effects of the AEDs. She is still experiencing a lot of anxiety. She also feels depressed. No SI/HI. Current AEDs:  Trileptal 600 mg BID  Onfi 10 mg daily    Labs reviewed:  1/18/22  Oxcarbazepine level 14      Initial history:  She has a history of seizures, pseudoseizures, bipolar disorder. She was following with Dr. Toan Lima for her seizures and was last seen in his clinic in 3/2021. She was dismissed from the clinic in June 2021 for multiple missed appointments. She is currently on trileptal and clobazam. Plan at his last clinic visit was to wean Onfi. Patient started having more seizures so patient was put back on Onfi to 10 mg daily. She ran out of Onfi 2 weeks ago and has been only taking half of her trileptal dose. Upon walking into the room, patient notes she feels like she is having a seizure. Patient is sitting in the chair hyperventilating. She is able to answer my questions and follow commands throughout episode. She notes she is very anxious and her anxiety is poorly controlled.  She has having personal issues at home which has made her anxiety worse. Patient had two total episodes in clinic. Patient is very tearful and scared. Discussed these are likely due to her anxiety but will talk to her previous neurologist. Will send patient to ED for further evaluation. Typical episode: whole face tingling, clenches jaw, and rolling of eyes back and forth following by generalized shaking. She notes after the episodes she typically screams. Typical episode can be few seconds to 30 min. She notes she feels tired after the episode. She had a video EEG in 10/2020 which was normal.         PREVIOUS WORKUP:     Lab Results   Component Value Date    WBC 8.7 01/18/2022    HGB 14.8 01/18/2022    HCT 43.9 01/18/2022    MCV 86.6 01/18/2022     (H) 01/18/2022       Past Medical History:   Diagnosis Date    ADD (attention deficit disorder)     Anxiety     Claustrophobia     Depression     Fear of     anxiety r/t mask on face;     H/O nephrolithotomy with removal of calculi 10/18/2019    at [facility] via Dr. Lesly Odonnell Hydrocephalus [facility]) with V-P shunt    Hydronephrosis     right sided r/t kidney stone    Intermittent self-catheterization of bladder     through mitroffanoff/#12 fr catheters tid/qid    Internal tibial torsion     Kidney stone     MDRO (multiple drug resistant organisms) resistance 3/2013    E.  Coli urine    Mitrofanoff appendicovesicostomy present ([address]     Myelomeningocele ([address] L5, S1 level    Neurogenic bladder     PTSD (post-traumatic stress disorder)     Pyelonephritis 3/04    Seizure disorder ([address]     last seizure approx July 2019    Self-catheterizes urinary bladder     Sleep apnea     has consult with pulm (not sure who) on 10/21/19    Spina bifida [facility])         Past Surgical History:   Procedure Laterality Date    BACK SURGERY  06/10/2014    release of tethered cord    BLADDER SURGERY  7/99 S/P urethral lengehtening and reimplantation with  bladder augmentation; revised 1/00    and mitrofanoff     CYSTO/URETERO/PYELOSCOPY, CALCULUS TX Right 10/2/2019    cystoscope, attempted right ureteroscopy performed by Parveen Stark MD at [address] [facility]  11/4/14    [address]  09/23/2019     CYSTOSCOPY URETERAL STENT INSERTION (Right )    CYSTOSCOPY Right 9/23/2019    CYSTOSCOPY URETERAL STENT INSERTION performed by Lauren Hedrick MD at [address] Right 10/02/2019    CYSTO, URETEROSCOPY, LASER LITHO, [address] GROWTH PLATE SURGERY  78/94, stapled growth plate bilateral knees    HYSTERECTOMY, VAGINAL  02/10/2014    LEFT SALPINGECTOMY    NEPHROLITHOTOMY Right 10/18/2019    HOLMIUM LASER STANDBY, ANTEGRADE NEPHROSTOMY, ANTEGRADE URETEROSCOPY AND PYELOSCOPY, C-ARM  (PT NOT COMING FROM  INTERVENTIONAL) performed by Parveen Stark MD at [address] NEPHROSTOMY Right 10/02/2019    percutanious    OSTEOTOMY  4/00, 12/94 bilateral distal tibial/fibula fib derotational osteotomy    OSTEOTOMY Left 2007    OTHER SURGICAL HISTORY  7/01 Peabody tendon transfer    VAGINAL PROLAPSE REPAIR  07/2016    VENTRICULOPERITONEAL SHUNT  9/12/93; revised 3/16/96; 1/94; 9/93; programmable shunt 4/08    DR DOLL        Social History     Socioeconomic History    Marital status: Single     Spouse name: Not on file    Number of children: Not on file    Years of education: Not on file    Highest education level: Not on file   Occupational History    Not on file   Tobacco Use    Smoking status: Light Tobacco Smoker     Packs/day: 0.25     Years: 6.00     Pack years: 1.50     Types: Cigarettes    Smokeless tobacco: Never Used    Tobacco comment: QUIT 2013   Substance and Sexual Activity    Alcohol use: Yes     Comment: RARE    Drug use: Never    Sexual activity: Yes     Partners: Male   Other Topics Concern    Not on file   Social History Narrative    Not on file     Social Determinants of Health     Financial Resource Strain:     Difficulty of Paying Living Expenses: Not on file   Food mouth daily as needed       NONFORMULARY Take 10 mg by mouth 2 times daily Pt states takes ONSI       brexpiprazole (REXULTI) 1 MG TABS tablet Take 1 mg by mouth daily (Patient not taking: Reported on 1/18/2022)      estrogens, conjugated, (PREMARIN) 1.25 MG tablet Take 2.5 mg by mouth daily  (Patient not taking: Reported on 1/18/2022)      prazosin (MINIPRESS) 1 MG capsule Take 1 mg by mouth 2 times daily Indications: ON HOLD PER DR Alecia Wade  (Patient not taking: Reported on 1/18/2022)      ibuprofen (ADVIL;MOTRIN) 800 MG tablet Take 1 tablet by mouth every 8 hours as needed (pain). (Patient not taking: Reported on 2/14/2022) 40 tablet 0     No current facility-administered medications for this visit. Allergies   Allergen Reactions    Latex Rash    Furadantin [Nitrofurantoin] Other (See Comments)     Night tremors and hallucinations    Banana Rash    Kiwi Extract Rash    Phenobarbital Rash    Sulfa Antibiotics Rash        REVIEW OF SYSTEMS:     Review of Systems   Constitutional: Negative. HENT: Negative. Eyes: Negative. Respiratory: Negative. Cardiovascular: Negative. Gastrointestinal: Negative. Endocrine: Negative. Genitourinary: Negative. Musculoskeletal: Negative. Skin: Negative. Neurological: Negative for dizziness, tremors, seizures, syncope, facial asymmetry, speech difficulty, weakness, light-headedness, numbness and headaches. Hematological: Negative. Psychiatric/Behavioral: Negative for sleep disturbance.         VITALS  /87 (Site: Left Upper Arm, Position: Sitting, Cuff Size: Medium Adult)   Pulse 86   Ht 5' (1.524 m)   Wt 201 lb (91.2 kg)   LMP 02/06/2012   BMI 39.26 kg/m²          NEUROLOGICAL EXAMINATION:         Mental status    Alert and oriented x 3; intact memory with no confusion, speech or language problems; no hallucinations or delusions  Fund of information appropriate for level of education    Cranial nerves    II - visual fields intact to confrontation  III, IV, VI - extra-ocular muscles full: no pupillary defect; no HEMA, no nystagmus, no ptosis   V - normal facial sensation                                                               VII - normal facial symmetry                                                             VIII - intact hearing                                                                             IX, X - symmetrical palate                                                                  XI - symmetrical shoulder shrug                                                       XII - tongue midline without atrophy or fasciculation      Motor function  Normal muscle bulk and tone; strength 5/5 on all 4 extremities, no pronator drift      Sensory function Intact to light touch, pinprick in all 4 extremities      Cerebellar Intact fine motor movement. Reflex function DTR 2+ on bilateral UE and LE, symmetric. ASSESSMENT / PLAN:   This is a 64-year-old female who presents to establish care for her seizures. She was previously followed by [address] [place] [address] for her seizures as well as her pseudoseizures. She was taking Trileptal 600 mg twice a day as well as Onfi 10 mg daily. At the last clinic visit which was her initial visit patient had a breakthrough seizure and was sent to the emergency department. Breakthrough seizure likely happened in the setting of not taking Onfi 2 weeks prior to the episode. Unclear if patient has nonepileptic as well as epileptic events. She had an EEG in 2020 which was normal.  We will try to request records from [address] [place] [address]. We will continue current AED regimen and proceed with EEG. Follow-up in 3 months.        Ascencion Partida MD   Neurology & Sleep Medicine  [facility]

## 2022-06-19 DIAGNOSIS — G40.909 SEIZURE DISORDER (HCC): ICD-10-CM

## 2022-06-20 RX ORDER — CLOBAZAM 10 MG/1
TABLET ORAL
Qty: 30 TABLET | Refills: 5 | Status: SHIPPED | OUTPATIENT
Start: 2022-06-20 | End: 2022-12-20

## 2022-06-20 NOTE — TELEPHONE ENCOUNTER
Pharmacy and pt.  requesting refill of Onfi.       Medication active on med list yes      Date of last fill: 2/14/22 with 5 refills verified on 6/20/22  verified by FRITZ RN      Date of last appointment 2/14/22    Next Visit Date:  8/2/2022

## 2022-06-27 ENCOUNTER — OFFICE VISIT (OUTPATIENT)
Dept: UROLOGY | Age: 27
End: 2022-06-27
Payer: COMMERCIAL

## 2022-06-27 VITALS
BODY MASS INDEX: 39.46 KG/M2 | HEART RATE: 84 BPM | WEIGHT: 201 LBS | DIASTOLIC BLOOD PRESSURE: 102 MMHG | SYSTOLIC BLOOD PRESSURE: 139 MMHG | HEIGHT: 60 IN | TEMPERATURE: 98.2 F

## 2022-06-27 DIAGNOSIS — N21.0 BLADDER STONE: ICD-10-CM

## 2022-06-27 DIAGNOSIS — N31.9 NEUROGENIC BLADDER: Primary | ICD-10-CM

## 2022-06-27 PROCEDURE — 99214 OFFICE O/P EST MOD 30 MIN: CPT | Performed by: UROLOGY

## 2022-06-27 ASSESSMENT — ENCOUNTER SYMPTOMS
BACK PAIN: 0
VOMITING: 0
COUGH: 0
EYE REDNESS: 0
SHORTNESS OF BREATH: 0
EYE PAIN: 0
ABDOMINAL PAIN: 1
CONSTIPATION: 0
RESPIRATORY NEGATIVE: 1
NAUSEA: 1
WHEEZING: 0
DIARRHEA: 0
EYES NEGATIVE: 1

## 2022-06-27 NOTE — PROGRESS NOTES
Review of Systems   Constitutional: Negative. Negative for appetite change, chills and fever. Eyes: Negative. Negative for pain, redness and visual disturbance. Respiratory: Negative. Negative for cough, shortness of breath and wheezing. Cardiovascular: Negative. Negative for chest pain and leg swelling. Gastrointestinal: Positive for abdominal pain and nausea. Negative for constipation, diarrhea and vomiting. Genitourinary: Positive for dysuria. Negative for difficulty urinating, flank pain, frequency, hematuria and urgency. Musculoskeletal: Negative. Negative for back pain, joint swelling and myalgias. Skin: Negative. Negative for rash and wound. Neurological: Negative. Negative for dizziness, tremors and numbness. Hematological: Negative. Does not bruise/bleed easily.

## 2022-06-27 NOTE — PROGRESS NOTES
[address] [address]  Dept: [address] [facility] Urology Office Note - Established    Patient:  Radha Hutson  YOB: 1995  Date: 6/27/2022    The patient is a 27 y.o. female whopresents today for evaluation of the following problems:   Chief Complaint   Patient presents with    Follow-Up from Hospital     ER f/u 2 1/2 bladder stone       HPI  Is a very pleasant 53-year-old female who we have not seen in about 3 years. She does have a history of stones. She was born with spina bifida and has had lower urinary tract reconstruction. She does have a Mitrofanoff as well as a lengthened urethra. She is catheterizing daily through her Mitrofanoff. She does have a large bladder stone which was recently seen on the CT scan at [facility].    Summary of old records: N/A    Additional History: N/A    Procedures Today: N/A    Urinalysis today:  No results found for this visit on 06/27/22. Imaging Reviewed during this Office Visit: none  (results were independently reviewed by physician and radiology report verified)    AUA Symptom Score (6/27/2022):                                Last BUN and creatinine:  Lab Results   Component Value Date    BUN 10 01/18/2022     Lab Results   Component Value Date    CREATININE 0.63 01/18/2022       Additional Lab/Culture results: none    PAST MEDICAL, FAMILY AND SOCIAL HISTORY UPDATE:  Past Medical History:   Diagnosis Date    ADD (attention deficit disorder)     Anxiety     Claustrophobia     Depression     Fear of     anxiety r/t mask on face;     H/O nephrolithotomy with removal of calculi 10/18/2019    at [facility] via Dr. Vazquez Drivers Hydrocephalus [facility]) with V-P shunt    Hydronephrosis     right sided r/t kidney stone    Intermittent self-catheterization of bladder     through mitroffanoff/#12 fr catheters tid/qid    Internal tibial torsion  Kidney stone     MDRO (multiple drug resistant organisms) resistance 3/2013    E.  Coli urine    Mitrofanoff appendicovesicostomy present ([address]     Myelomeningocele ([address] L5, S1 level    Neurogenic bladder     PTSD (post-traumatic stress disorder)     Pyelonephritis 3/04    Seizure disorder ([address]     last seizure approx July 2019    Self-catheterizes urinary bladder     Sleep apnea     has consult with pulm (not sure who) on 10/21/19    Spina bifida [facility])      Past Surgical History:   Procedure Laterality Date    BACK SURGERY  06/10/2014    release of tethered cord    BLADDER SURGERY  7/99 S/P urethral lengehtening and reimplantation with  bladder augmentation; revised 1/00    and mitrofanoff     CYSTO/URETERO/PYELOSCOPY, CALCULUS TX Right 10/2/2019    cystoscope, attempted right ureteroscopy performed by Pawan Landaverde MD at [address] [place] [address]  11/4/14    CYSTOSCOPY  09/23/2019     CYSTOSCOPY URETERAL STENT INSERTION (Right )    CYSTOSCOPY Right 9/23/2019    CYSTOSCOPY URETERAL STENT INSERTION performed by Roscoe Baugh MD at [address] [place] [address] Right 10/02/2019    CYSTO, URETEROSCOPY, LASER LITHO, STENT EXCHANGE     GROWTH PLATE SURGERY  31/23, stapled growth plate bilateral knees    HYSTERECTOMY, VAGINAL  02/10/2014    LEFT SALPINGECTOMY    NEPHROLITHOTOMY Right 10/18/2019    HOLMIUM LASER STANDBY, ANTEGRADE NEPHROSTOMY, ANTEGRADE URETEROSCOPY AND PYELOSCOPY, C-ARM  (PT NOT COMING FROM  INTERVENTIONAL) performed by Pawan Landaverde MD at [address] [facility] NEPHROSTOMY Right 10/02/2019    percutanious    OSTEOTOMY  4/00, 12/94 bilateral distal tibial/fibula fib derotational osteotomy    OSTEOTOMY Left 2007    OTHER SURGICAL HISTORY  7/01 Peabody tendon transfer    VAGINAL PROLAPSE REPAIR  07/2016    VENTRICULOPERITONEAL SHUNT  9/12/93; revised 3/16/96; 1/94; 9/93; programmable shunt 4/08    DR DOLL     Family History   Problem Relation Age of Onset    High Blood Pressure Mother    [facility] Other Mother         anxiety    High Blood Pressure Father      Outpatient Medications Marked as Taking for the 6/27/22 encounter (Office Visit) with Antoinette Lua MD   Medication Sig Dispense Refill    cloBAZam (ONFI) 10 MG TABS tablet take 1 tablet by mouth once daily 30 tablet 5    OXcarbazepine (TRILEPTAL) 300 MG tablet Take 2 tablets by mouth 2 times daily 360 tablet 4    estradiol (VIVELLE) 0.1 MG/24HR Place 1 patch onto the skin Twice a Week       NONFORMULARY Take 10 mg by mouth 2 times daily Pt states takes ONSI       busPIRone (BUSPAR) 15 MG tablet Take 15 mg by mouth 2 times daily       escitalopram (LEXAPRO) 20 MG tablet Take 1 tablet by mouth daily      QUEtiapine (SEROQUEL) 25 MG tablet Take 200 mg by mouth nightly       famotidine (PEPCID) 20 MG tablet take 1 tablet by mouth twice a day  0    ondansetron (ZOFRAN-ODT) 4 MG disintegrating tablet Take 4 mg by mouth every 8 hours as needed for Nausea or Vomiting      cetirizine (ZYRTEC) 10 MG tablet Take 10 mg by mouth daily as needed         (All medications reviewed and updated by provider sincelast office visit or hospitalization)   Latex, Furadantin [nitrofurantoin], Banana, Kiwi extract, Phenobarbital, and Sulfa antibiotics  Social History     Tobacco Use   Smoking Status Light Tobacco Smoker    Packs/day: 0.25    Years: 6.00    Pack years: 1.50    Types: Cigarettes   Smokeless Tobacco Never Used   Tobacco Comment    QUIT 2013      (If patient a smoker, smoking cessation counseling offered)     Social History     Substance and Sexual Activity   Alcohol Use Yes    Comment: RARE       REVIEW OF SYSTEMS:  Review of Systems      Physical Exam:      Vitals:    06/27/22 1329   BP: (!) 139/102   Pulse: 84   Temp: 98.2 °F (36.8 °C)     Body mass index is 39.26 kg/m². Patient is a 27 y.o. female in noacute distress and alert and oriented to person, place and time. Physical Exam  Constitutional: Patient in no acute distress.   Neuro: Alert andoriented to person, place and time. Psych: Mood normal, affect normal  Skin: No rash noted  HEENT: Head: Normocephalic and atraumatic  Conjunctivae and EOM are normal. Pupils are equal, round  Nose: Normal  Right External Ear: Normal; Left External Ear: Normal  Mouth: Mucosa Moist  Neck: Supple  Lungs: Respiratory effort is normal  Cardiovascular: Warm & Pink  Abdomen: Soft, non-tender, non-distended with no CVA,  No flank tenderness,  Or hepatosplenomegaly   Lymphatics: No palpable lymphadenopathy. Bladder non-tender and not distended. Musculoskeletal: Normalgait and station      and Plan      1. Neurogenic bladder    2. Bladder stone           Plan:   cystolithalopaxy with holmium laser at [facility]  Pt has alterned lower tract anatomy and above may be difficulty; patient may need cystolithotomy     No follow-ups on file. Prescriptions Ordered:  No orders of the defined types were placed in this encounter. Orders Placed:  No orders of the defined types were placed in this encounter. Paz Florentino MD    Agree with the ROS entered by the MA.

## 2022-06-29 ENCOUNTER — TELEPHONE (OUTPATIENT)
Dept: UROLOGY | Age: 27
End: 2022-06-29

## 2022-06-29 ENCOUNTER — TELEPHONE (OUTPATIENT)
Dept: NEUROLOGY | Age: 27
End: 2022-06-29

## 2022-06-29 NOTE — TELEPHONE ENCOUNTER
cystolitholapaxy @ [facility] 8/3/22 11:30am   PAT @ [facility] 7/20/22 3:00pm           Spoke with patient, procedure info emailed.

## 2022-06-30 NOTE — TELEPHONE ENCOUNTER
Please advise if Halina Noonan is neurologically cleared for surgery under general anesthesia.        Surgery requested date: 08/03/2022  Surgery: Cystolitholapaxy

## 2022-06-30 NOTE — TELEPHONE ENCOUNTER
Epilepsy is not a contraindication to medically indicated surgery. All antiepileptic doses should be given on time even if n.p.o. for surgery. While it is not possible to predict if or when this patient will have a seizure, the patient should remain on all seizure medications.       That would be my statement for \"clearance\"

## 2022-07-19 RX ORDER — SODIUM CHLORIDE, SODIUM LACTATE, POTASSIUM CHLORIDE, CALCIUM CHLORIDE 600; 310; 30; 20 MG/100ML; MG/100ML; MG/100ML; MG/100ML
1000 INJECTION, SOLUTION INTRAVENOUS CONTINUOUS
Status: CANCELLED | OUTPATIENT
Start: 2022-07-19

## 2022-07-19 NOTE — DISCHARGE INSTRUCTIONS
Pre-operative Instructions           NOTHING to eat or drink after midnight the night prior to surgery  (This includes gum, candy, mints, chewing tobacco, etc). Please arrive at the surgery center by 9:30 AM on 8/3/2022 (or as directed by your surgeon's office). See Directons to Surgery Center below. Please take only the following medication(s) the day of surgery with a small sip of water: Per neurology note on clearance: \"All antiepileptic doses should be given on time even if NP for surgery. \"   Also may take famotidine (Pepcid)     Please stop any blood thinning medications  AS DIRECTED BY PRESCRIBING PROVIDER! : Ibuprofen  Failure to stop these medications as instructed (too soon or too late) may result in injury to you, or your surgery may need to be rescheduled. Below is a list of some examples for your reference. Antiplatelets : (stop blood cells (called platelets) from sticking together and forming a blood clot):   Aspirin (Bufferin, Ecotrin), Clopidogrel (Plavix), Ticagrelor (Brilinta), Prasugrel (Effient), Dipyridamole/aspirin (Aggrenox), Ticlopidine (Ticlid), Eptifibatide (Integrilin)    Anticoagulants: (slow down your body's process of making clots): Warfarin (Coumadin), Rivaroxaban (Xarelto), Dabigatran (Pradaxa), Apixaban (Eliquis), Edoxaban (Savaysa), Heparin/Enoxaparin (Lovenox), Fondaparinux (Arixtra)    NSAIDS: Aspirin (Bufferin, Ecotrin), Ibuprofen (Motrin, Nuprin,Advil), Naproxen (Aleve),Meloxicam (Mobic), Celecoxib (Celebrex), Diclofenac (Voltaren), Etodolac (Lodine), Indomethacin, Ketorolac, Nabumetone, Oxaprozin (Daypro), Piroxicam (Feldene), Excedrin (has aspirin in it)    Herbal supplements: Bromelain, Cinnamon, [employer], [place] (female ginseng), Fish oil, Garlic, Mendy,Ginkgo biloba, Grape seed extract, Turmeric, Vitamin E, etc....)    You may continue the rest of your medications through the night before surgery unless instructed otherwise.     If applicable:   Do not take diabetic medications on the day of surgery. Please use/bring daily inhalers with you     7/20/22  11:49 AM      ___________________  _______________________  Signature (Provider)              Signature (Patient)     Day of Surgery/Procedure    As a patient at [facility] you can expect quality medical and nursing care that is centered on your individual needs. Our goal is to make your surgical experience as comfortable as possible    Directions to the [address] [place] [address]. [facility] is located in the Emergency Room parking lot on [facility] or there is additional parking across the street. The address is [address] [place] [address]. Please check in at the [facility] upon arrival.     Patient Instructions  In case of any illness please contact your surgeons office for instructions prior to coming to the hospital.    Due to current restrictions you are only allowed 2 adults to be with you. Masks are to be worn and screening will take place on arrival.     Bring your current list of medications, vitamins, herbals and anything you might take on an  \"as needed \" basis. It is important we have a correct list with dosages and frequencies. Please verify your list with your medications at home or bring all of your bottles with you. If you have been given a blood band be sure to bring it with you on the day of surgery. Do not put it on or close the clasp. Use and bring any inhalers if you are currently using one. It is ok to brush your teeth but do not swallow any water. You may be required to provide a urine sample upon your arrival to the pre-op area, so please take this into consideration prior to using the restroom. No jewelry or piercing's to be worn into surgery because you might be injured because of them. No contact lenses to be worn.   It is ok to wear your glasses in pre op but they will be removed prior to going into the operating room.    Dentures/partials will likely need to be removed in pre op depending on your type of anesthesia, please do not use adhesives on the day of surgery. Bathe as instructed with the special soap given to you. No lotions, powders or creams after bathing. Wear loose comfortable clothing / shoes that are easy to get on and off over wounds or casts. If you are going to be admitted after surgery please bring your Cpap or BiPap if you have it at home. Keep patient belongings to a minimum and leave valuables at home. If you are staying overnight with us, please bring a SMALL bag of personal items. We cannot accommodate large items, like suitcases. If you are going home after anesthesia or sedation then you must have a responsible adult with you to take you home and to be with you for the first 24 hours after surgery. If you do not have someone to stay with you your surgery might get cancelled. Please contact your surgeon's office to see if other arrangements can be made if you can not find someone to stay with you. If you have any other questions on the day of surgery please contact [phone] or [phone]    If you have any other questions regarding your procedure/surgery please call  your surgeon's office.

## 2022-07-20 ENCOUNTER — HOSPITAL ENCOUNTER (OUTPATIENT)
Dept: PREADMISSION TESTING | Age: 27
Discharge: HOME | End: 2022-07-24
Payer: COMMERCIAL

## 2022-07-20 ENCOUNTER — HOSPITAL ENCOUNTER (EMERGENCY)
Age: 27
Discharge: HOME | End: 2022-07-20
Attending: EMERGENCY MEDICINE
Payer: COMMERCIAL

## 2022-07-20 VITALS
DIASTOLIC BLOOD PRESSURE: 104 MMHG | HEIGHT: 60 IN | SYSTOLIC BLOOD PRESSURE: 140 MMHG | RESPIRATION RATE: 18 BRPM | HEART RATE: 83 BPM | WEIGHT: 180 LBS | OXYGEN SATURATION: 100 % | BODY MASS INDEX: 35.34 KG/M2 | TEMPERATURE: 97.5 F

## 2022-07-20 VITALS
BODY MASS INDEX: 35.53 KG/M2 | SYSTOLIC BLOOD PRESSURE: 136 MMHG | TEMPERATURE: 98.3 F | RESPIRATION RATE: 18 BRPM | OXYGEN SATURATION: 95 % | HEIGHT: 60 IN | WEIGHT: 181 LBS | DIASTOLIC BLOOD PRESSURE: 107 MMHG | HEART RATE: 75 BPM

## 2022-07-20 DIAGNOSIS — R11.14 BILIOUS VOMITING WITH NAUSEA: ICD-10-CM

## 2022-07-20 DIAGNOSIS — N39.0 URINARY TRACT INFECTION ASSOCIATED WITH CATHETERIZATION OF URINARY TRACT, UNSPECIFIED INDWELLING URINARY CATHETER TYPE, INITIAL ENCOUNTER (HCC): ICD-10-CM

## 2022-07-20 DIAGNOSIS — R10.84 GENERALIZED ABDOMINAL PAIN: Primary | ICD-10-CM

## 2022-07-20 DIAGNOSIS — T83.511A URINARY TRACT INFECTION ASSOCIATED WITH CATHETERIZATION OF URINARY TRACT, UNSPECIFIED INDWELLING URINARY CATHETER TYPE, INITIAL ENCOUNTER (HCC): ICD-10-CM

## 2022-07-20 LAB
-: ABNORMAL
ABSOLUTE EOS #: <0.03 K/UL (ref 0–0.44)
ABSOLUTE IMMATURE GRANULOCYTE: 0.05 K/UL (ref 0–0.3)
ABSOLUTE LYMPH #: 2.03 K/UL (ref 1.1–3.7)
ABSOLUTE MONO #: 0.67 K/UL (ref 0.1–1.2)
ALBUMIN SERPL-MCNC: 4.6 G/DL (ref 3.5–5.2)
ALBUMIN/GLOBULIN RATIO: 1.5 (ref 1–2.5)
ALP BLD-CCNC: 173 U/L (ref 35–104)
ALT SERPL-CCNC: 28 U/L (ref 5–33)
ANION GAP SERPL CALCULATED.3IONS-SCNC: 9 MMOL/L (ref 9–17)
AST SERPL-CCNC: 16 U/L
BASOPHILS # BLD: 1 % (ref 0–2)
BASOPHILS ABSOLUTE: 0.05 K/UL (ref 0–0.2)
BILIRUB SERPL-MCNC: 0.52 MG/DL (ref 0.3–1.2)
BILIRUBIN URINE: NEGATIVE
BUN BLDV-MCNC: 9 MG/DL (ref 6–20)
CALCIUM SERPL-MCNC: 10.1 MG/DL (ref 8.6–10.4)
CASTS UA: ABNORMAL /LPF (ref 0–2)
CHLORIDE BLD-SCNC: 97 MMOL/L (ref 98–107)
CO2: 25 MMOL/L (ref 20–31)
COLOR: YELLOW
CREAT SERPL-MCNC: 0.52 MG/DL (ref 0.5–0.9)
EOSINOPHILS RELATIVE PERCENT: 0 % (ref 1–4)
EPITHELIAL CELLS UA: ABNORMAL /HPF (ref 0–5)
GFR AFRICAN AMERICAN: >60 ML/MIN
GFR NON-AFRICAN AMERICAN: >60 ML/MIN
GFR SERPL CREATININE-BSD FRML MDRD: ABNORMAL ML/MIN/{1.73_M2}
GLUCOSE BLD-MCNC: 97 MG/DL (ref 70–99)
GLUCOSE URINE: NEGATIVE
HCT VFR BLD CALC: 38.6 % (ref 36.3–47.1)
HEMOGLOBIN: 13.4 G/DL (ref 11.9–15.1)
IMMATURE GRANULOCYTES: 1 %
KETONES, URINE: ABNORMAL
LEUKOCYTE ESTERASE, URINE: ABNORMAL
LIPASE: 37 U/L (ref 13–60)
LYMPHOCYTES # BLD: 21 % (ref 24–43)
MCH RBC QN AUTO: 30.1 PG (ref 25.2–33.5)
MCHC RBC AUTO-ENTMCNC: 34.7 G/DL (ref 28.4–34.8)
MCV RBC AUTO: 86.7 FL (ref 82.6–102.9)
MONOCYTES # BLD: 7 % (ref 3–12)
MUCUS: ABNORMAL
NITRITE, URINE: NEGATIVE
NRBC AUTOMATED: 0 PER 100 WBC
PDW BLD-RTO: 12.7 % (ref 11.8–14.4)
PH UA: 6.5 (ref 5–8)
PLATELET # BLD: 486 K/UL (ref 138–453)
PMV BLD AUTO: 8.4 FL (ref 8.1–13.5)
POTASSIUM SERPL-SCNC: 3.6 MMOL/L (ref 3.7–5.3)
PROTEIN UA: ABNORMAL
RBC # BLD: 4.45 M/UL (ref 3.95–5.11)
RBC UA: ABNORMAL /HPF (ref 0–2)
SARS-COV-2, RAPID: NOT DETECTED
SEG NEUTROPHILS: 70 % (ref 36–65)
SEGMENTED NEUTROPHILS ABSOLUTE COUNT: 7 K/UL (ref 1.5–8.1)
SODIUM BLD-SCNC: 131 MMOL/L (ref 135–144)
SPECIFIC GRAVITY UA: 1.01 (ref 1–1.03)
SPECIMEN DESCRIPTION: NORMAL
TOTAL PROTEIN: 7.7 G/DL (ref 6.4–8.3)
TURBIDITY: CLEAR
URINE HGB: ABNORMAL
UROBILINOGEN, URINE: NORMAL
WBC # BLD: 9.8 K/UL (ref 3.5–11.3)
WBC UA: ABNORMAL /HPF (ref 0–5)

## 2022-07-20 PROCEDURE — 96374 THER/PROPH/DIAG INJ IV PUSH: CPT

## 2022-07-20 PROCEDURE — 96376 TX/PRO/DX INJ SAME DRUG ADON: CPT

## 2022-07-20 PROCEDURE — 81001 URINALYSIS AUTO W/SCOPE: CPT

## 2022-07-20 PROCEDURE — 6360000002 HC RX W HCPCS: Performed by: EMERGENCY MEDICINE

## 2022-07-20 PROCEDURE — 83690 ASSAY OF LIPASE: CPT

## 2022-07-20 PROCEDURE — 96361 HYDRATE IV INFUSION ADD-ON: CPT

## 2022-07-20 PROCEDURE — 96375 TX/PRO/DX INJ NEW DRUG ADDON: CPT

## 2022-07-20 PROCEDURE — 85025 COMPLETE CBC W/AUTO DIFF WBC: CPT

## 2022-07-20 PROCEDURE — 99284 EMERGENCY DEPT VISIT MOD MDM: CPT

## 2022-07-20 PROCEDURE — 87086 URINE CULTURE/COLONY COUNT: CPT

## 2022-07-20 PROCEDURE — 80053 COMPREHEN METABOLIC PANEL: CPT

## 2022-07-20 PROCEDURE — 93005 ELECTROCARDIOGRAM TRACING: CPT | Performed by: EMERGENCY MEDICINE

## 2022-07-20 PROCEDURE — 87635 SARS-COV-2 COVID-19 AMP PRB: CPT

## 2022-07-20 PROCEDURE — 2580000003 HC RX 258: Performed by: EMERGENCY MEDICINE

## 2022-07-20 RX ORDER — ONDANSETRON 2 MG/ML
4 INJECTION INTRAMUSCULAR; INTRAVENOUS ONCE
Status: COMPLETED | OUTPATIENT
Start: 2022-07-20 | End: 2022-07-20

## 2022-07-20 RX ORDER — MIDAZOLAM HYDROCHLORIDE 2 MG/2ML
0.5 INJECTION, SOLUTION INTRAMUSCULAR; INTRAVENOUS ONCE
Status: DISCONTINUED | OUTPATIENT
Start: 2022-07-20 | End: 2022-07-20

## 2022-07-20 RX ORDER — SODIUM CHLORIDE, SODIUM LACTATE, POTASSIUM CHLORIDE, AND CALCIUM CHLORIDE .6; .31; .03; .02 G/100ML; G/100ML; G/100ML; G/100ML
1000 INJECTION, SOLUTION INTRAVENOUS ONCE
Status: COMPLETED | OUTPATIENT
Start: 2022-07-20 | End: 2022-07-20

## 2022-07-20 RX ORDER — MIDAZOLAM HYDROCHLORIDE 2 MG/2ML
2 INJECTION, SOLUTION INTRAMUSCULAR; INTRAVENOUS ONCE
Status: COMPLETED | OUTPATIENT
Start: 2022-07-20 | End: 2022-07-20

## 2022-07-20 RX ORDER — ONDANSETRON 4 MG/1
4 TABLET, ORALLY DISINTEGRATING ORAL EVERY 8 HOURS PRN
Qty: 15 TABLET | Refills: 0 | Status: SHIPPED | OUTPATIENT
Start: 2022-07-20 | End: 2022-07-25

## 2022-07-20 RX ADMIN — SODIUM CHLORIDE, POTASSIUM CHLORIDE, SODIUM LACTATE AND CALCIUM CHLORIDE 1000 ML: 600; 310; 30; 20 INJECTION, SOLUTION INTRAVENOUS at 16:25

## 2022-07-20 RX ADMIN — ONDANSETRON 4 MG: 2 INJECTION INTRAMUSCULAR; INTRAVENOUS at 16:25

## 2022-07-20 RX ADMIN — ONDANSETRON 4 MG: 2 INJECTION INTRAMUSCULAR; INTRAVENOUS at 19:52

## 2022-07-20 RX ADMIN — MIDAZOLAM HYDROCHLORIDE 2 MG: 1 INJECTION, SOLUTION INTRAMUSCULAR; INTRAVENOUS at 20:27

## 2022-07-20 ASSESSMENT — ENCOUNTER SYMPTOMS
VOMITING: 1
ALLERGIC/IMMUNOLOGIC NEGATIVE: 1
CONSTIPATION: 1
DIARRHEA: 0
ABDOMINAL DISTENTION: 0
ABDOMINAL PAIN: 1
COUGH: 0
NAUSEA: 1
SHORTNESS OF BREATH: 0
EYES NEGATIVE: 1

## 2022-07-20 ASSESSMENT — LIFESTYLE VARIABLES
HOW OFTEN DO YOU HAVE A DRINK CONTAINING ALCOHOL: NEVER
HOW MANY STANDARD DRINKS CONTAINING ALCOHOL DO YOU HAVE ON A TYPICAL DAY: PATIENT DOES NOT DRINK

## 2022-07-20 ASSESSMENT — PAIN - FUNCTIONAL ASSESSMENT: PAIN_FUNCTIONAL_ASSESSMENT: NONE - DENIES PAIN

## 2022-07-20 NOTE — ED PROVIDER NOTES
[address] [facility]     Emergency Department     Faculty Attestation    I performed a history and physical examination of the patient and discussed management with the resident. I reviewed the residents note and agree with the documented findings and plan of care. Any areas of disagreement are noted on the chart. I was personally present for the key portions of any procedures. I have documented in the chart those procedures where I was not present during the key portions. I have reviewed the emergency nurses triage note. I agree with the chief complaint, past medical history, past surgical history, allergies, medications, social and family history as documented unless otherwise noted below. For Physician Assistant/ Nurse Practitioner cases/documentation I have personally evaluated this patient and have completed at least one if not all key elements of the E/M (history, physical exam, and MDM). Additional findings are as noted. I have personally seen and evaluated the patient. I find the patient's history and physical exam are consistent with the NP/PA documentation. I agree with the care provided, treatment rendered, disposition and follow-up plan. This patient was evaluated in the Emergency Department for symptoms described in the history of present illness. The patient was evaluated in the context of the global COVID-19 pandemic, which necessitated consideration that the patient might be at risk for infection with the SARS-CoV-2 virus that causes COVID-19. Institutional protocols and algorithms that pertain to the evaluation of patients at risk for COVID-19 are in a state of rapid change based on information released by regulatory bodies including the CDC and federal and state organizations. These policies and algorithms were followed during the patient's care in the ED.     25-year-old female with a history of spina bifida, self caths via Mitrofanoff presenting from preop testing with ongoing nausea, vomiting. Has not been tolerating any of her home medications including ciprofloxacin which she is on for UTI. She is having surgery to remove a bladder stone on 8/3. No fever, but has been having chills. No recent sick contacts. Exam:  General: Laying on the bed, awake, alert, and in no acute distress  CV: normal rate and regular rhythm  Lungs: Breathing comfortably on room air with no tachypnea, hypoxia, or increased work of breathing  Abdomen: soft, non-tender, non-distended  HEENT: Ecchymosis around the right eye, patient states from repeated vomiting. No other obvious trauma.     Plan:  Lab work-up including CBC, electrolytes, lipase  IV hydration and antiemetics  Will recheck UA, if continued infection is present, will discuss with urology        Yajaira Oliver MD   Attending Emergency  Physician    (Please note that portions of this note were completed with a voice recognition program. Efforts were made to edit the dictations but occasionally words are mis-transcribed.)           Yajaira Oliver MD  07/20/22 9153

## 2022-07-20 NOTE — ED NOTES
Pt resting comfortably in no acute distress. Respirations even and unlabored. Call light remains within reach. No needs at this time.        Farideh Wayne RN  07/20/22 1911

## 2022-07-20 NOTE — ED PROVIDER NOTES
[facility] ED  Emergency Department Encounter  Emergency Medicine Resident     Pt Name:Brittanie Hamm  MRN: [id number]  [place] 1995  Date of evaluation: 7/20/22  PCP:  Rosa Alvarez MD      [address] [place] [address]       Chief Complaint   Patient presents with    Nausea     Nausea and vomitting x 3 days       HISTORY OF PRESENT ILLNESS  (Location/Symptom, Timing/Onset, Context/Setting, Quality, Duration, Modifying Factors, Severity.)      Kendall Coates is a 27 y.o. female history of spina bifida who presents with 3 days of nausea and vomiting. Patient has a history of UTI and is currently being treated with Cipro. States Cipro has caused her nausea in the past but never vomiting or abdominal pain. She [facility] Is planning to undergo surgery with urology in the next few days for bladder stone. She she also states she recently went through Cymbalta withdrawal 2 weeks ago, however has been treated with Celexa and Geodon in the last week in inpatient psych at [facility]. She had a negative COVID test last Thursday. States abdominal pain and nausea/vomiting began on Sunday. Relief with hot showers. He denies fevers but endorses chills. Has been constipated, no diarrhea. PAST MEDICAL / SURGICAL / SOCIAL / FAMILY HISTORY      has a past medical history of ADD (attention deficit disorder), Anxiety, Claustrophobia, Depression, Fear of, H/O nephrolithotomy with removal of calculi, History of suicidal ideation, Hydrocephalus ([address] Hydronephrosis, Intermittent self-catheterization of bladder, Internal tibial torsion, Kidney stone, MDRO (multiple drug resistant organisms) resistance, Mitrofanoff appendicovesicostomy present ([address] Myelomeningocele ([address] Neurogenic bladder, PTSD (post-traumatic stress disorder), Pyelonephritis, Seizure disorder ([address] Self-catheterizes urinary bladder, Sleep apnea, and Spina bifida ([address]        has a past surgical history that includes Ventriculoperitoneal shunt (9/12/93; revised 3/16/96; 1/94; 9/93; programmable shunt 4/08); Bladder surgery (7/99 S/P urethral lengehtening and reimplantation with  bladder augmentation; revised 1/00); osteotomy (4/00, 12/94 bilateral distal tibial/fibula fib derotational osteotomy); other surgical history (7/01 [place] tendon transfer); Growth plate surgery (07/16, stapled growth plate bilateral knees); osteotomy (Left, 2007); Hysterectomy, vaginal (02/10/2014); back surgery (06/10/2014); Cystoscopy (11/4/14); Vaginal prolapse repair (07/2016); Cystocopy (09/23/2019); Cystoscopy (Right, 9/23/2019); Cystoscopy (Right, 10/02/2019); Nephrostomy (Right, 10/02/2019); cysto/uretero/pyeloscopy, calculus tx (Right, 10/2/2019); and NEPHROLITHOTOMY (Right, 10/18/2019).       Social History     Socioeconomic History    Marital status: Single     Spouse name: Not on file    Number of children: Not on file    Years of education: Not on file    Highest education level: Not on file   Occupational History    Not on file   Tobacco Use    Smoking status: Light Smoker     Packs/day: 0.25     Years: 6.00     Pack years: 1.50     Types: Cigarettes    Smokeless tobacco: Never    Tobacco comments:     QUIT 2013   Substance and Sexual Activity    Alcohol use: Yes     Comment: RARE    Drug use: Never    Sexual activity: Yes     Partners: Male   Other Topics Concern    Not on file   Social History Narrative    Not on file     Social Determinants of Health     Financial Resource Strain: Not on file   Food Insecurity: Not on file   Transportation Needs: Not on file   Physical Activity: Not on file   Stress: Not on file   Social Connections: Not on file   Intimate Partner Violence: Not on file   Housing Stability: Not on file       Family History   Problem Relation Age of Onset    High Blood Pressure Mother     Other Mother         anxiety    High Blood Pressure Father        Allergies:  Latex, Furadantin [nitrofurantoin], Banana, Kiwi extract, Phenobarbital, and Sulfa antibiotics    Home Medications:  Prior to Admission medications    Medication Sig Start Date End Date Taking? Authorizing Provider   ondansetron (ZOFRAN ODT) 4 MG disintegrating tablet Place 1 tablet under the tongue every 8 hours as needed for Nausea 7/20/22 7/25/22 Yes Dai Daly, DO   cloBAZam (ONFI) 10 MG TABS tablet take 1 tablet by mouth once daily 6/20/22 12/20/22  Paddy Oleary MD   OXcarbazepine (TRILEPTAL) 300 MG tablet Take 2 tablets by mouth 2 times daily 2/14/22 2/28/23  Paddy Oleary MD   estradiol (VIVELLE) 0.1 MG/24HR Place 1 patch onto the skin Twice a Week  1/11/22   Historical Provider, MD   NONFORMULARY Take 10 mg by mouth 2 times daily Pt states takes ONSI     Historical Provider, MD   brexpiprazole (REXULTI) 1 MG TABS tablet Take 1 mg by mouth daily  Patient not taking: Reported on 1/18/2022    Historical Provider, MD   estrogens, conjugated, (PREMARIN) 1.25 MG tablet Take 2.5 mg by mouth daily   Patient not taking: Reported on 1/18/2022    Historical Provider, MD   busPIRone (BUSPAR) 15 MG tablet Take 15 mg by mouth 2 times daily     Historical Provider, MD   prazosin (MINIPRESS) 1 MG capsule Take 1 mg by mouth 2 times daily Indications: ON HOLD PER DR Alexa Sorto   Patient not taking: Reported on 1/18/2022 12/13/18   Historical Provider, MD   escitalopram (LEXAPRO) 20 MG tablet Take 1 tablet by mouth daily 1/1/19   Historical Provider, MD   QUEtiapine (SEROQUEL) 25 MG tablet Take 200 mg by mouth nightly  12/29/18   Historical Provider, MD   famotidine (PEPCID) 20 MG tablet take 1 tablet by mouth twice a day 1/13/17   Historical Provider, MD   ondansetron (ZOFRAN-ODT) 4 MG disintegrating tablet Take 4 mg by mouth every 8 hours as needed for Nausea or Vomiting    Historical Provider, MD   ibuprofen (ADVIL;MOTRIN) 800 MG tablet Take 1 tablet by mouth every 8 hours as needed (pain).   Patient not taking: Reported on 6/27/2022 2/11/14   Waqas Lips, DO   cetirizine (ZYRTEC) 10 MG tablet Take 10 mg by mouth daily as needed     Historical Provider, MD       REVIEW OF SYSTEMS    (2-9 systems for level 4, 10 or more for level 5)      Review of Systems   Constitutional:  Positive for appetite change, chills and fatigue. Negative for activity change and fever. HENT: Negative. Eyes: Negative. Respiratory:  Negative for cough and shortness of breath. Cardiovascular:  Positive for palpitations. Negative for chest pain. Gastrointestinal:  Positive for abdominal pain, constipation, nausea and vomiting. Negative for abdominal distention and diarrhea. Endocrine: Negative. Genitourinary:  Positive for hematuria. Musculoskeletal: Negative. Skin: Negative. Allergic/Immunologic: Negative. Neurological:  Positive for light-headedness. Negative for dizziness and weakness. Hematological: Negative. Psychiatric/Behavioral:  The patient is nervous/anxious. PHYSICAL EXAM   (up to 7 for level 4, 8 or more for level 5)      INITIAL VITALS:   BP (!) 136/107   Pulse 75   Temp 98.3 °F (36.8 °C) (Oral)   Resp 18   Ht 5' (1.524 m)   Wt 181 lb (82.1 kg)   LMP 02/06/2012   SpO2 95%   BMI 35.35 kg/m²     Physical Exam  Constitutional:       General: She is not in acute distress. Appearance: She is not ill-appearing. HENT:      Head: Normocephalic. Mouth/Throat:      Mouth: Mucous membranes are moist.      Pharynx: Oropharynx is clear. Eyes:      Extraocular Movements: Extraocular movements intact. Pupils: Pupils are equal, round, and reactive to light. Cardiovascular:      Rate and Rhythm: Normal rate and regular rhythm. Heart sounds: Normal heart sounds. Pulmonary:      Effort: Pulmonary effort is normal.      Breath sounds: Normal breath sounds. Abdominal:      General: Abdomen is flat. There is no distension. Palpations: Abdomen is soft. Tenderness:  There is no abdominal tenderness. There is no guarding. Musculoskeletal:         General: No swelling or tenderness. Skin:     General: Skin is warm. Neurological:      General: No focal deficit present. Mental Status: She is alert. Psychiatric:         Mood and Affect: Mood normal.       DIFFERENTIAL  DIAGNOSIS     PLAN ([place] [facility] / EKG):  Orders Placed This Encounter   Procedures    COVID-19, Rapid    CBC with Diff    CMP    Lipase    Urinalysis with Microscopic    Diet NPO    Inpatient consult to Urology    EKG 12 Lead       MEDICATIONS ORDERED:  Orders Placed This Encounter   Medications    lactated ringers bolus    ondansetron (ZOFRAN) injection 4 mg    ondansetron (ZOFRAN) injection 4 mg    DISCONTD: midazolam PF (VERSED) injection 0.5 mg    midazolam PF (VERSED) injection 2 mg    ondansetron (ZOFRAN ODT) 4 MG disintegrating tablet     Sig: Place 1 tablet under the tongue every 8 hours as needed for Nausea     Dispense:  15 tablet     Refill:  0     OK for oral if insurance doesn't cover sublingual.       DDX: cystitis, pyelonephritis, enteritis, SSRI withdrawal     DIAGNOSTIC RESULTS / EMERGENCY DEPARTMENT COURSE / MDM   LAB RESULTS:  Results for orders placed or performed during the hospital encounter of 07/20/22   COVID-19, Rapid    Specimen: Nasopharyngeal Swab   Result Value Ref Range    Specimen Description . NASOPHARYNGEAL SWAB     SARS-CoV-2, Rapid Not Detected Not Detected   CBC with Diff   Result Value Ref Range    WBC 9.8 3.5 - 11.3 k/uL    RBC 4.45 3.95 - 5.11 m/uL    Hemoglobin 13.4 11.9 - 15.1 g/dL    Hematocrit 38.6 36.3 - 47.1 %    MCV 86.7 82.6 - 102.9 fL    MCH 30.1 25.2 - 33.5 pg    MCHC 34.7 28.4 - 34.8 g/dL    RDW 12.7 11.8 - 14.4 %    Platelets 351 (H) 306 - 453 k/uL    MPV 8.4 8.1 - 13.5 fL    NRBC Automated 0.0 0.0 per 100 WBC    Seg Neutrophils 70 (H) 36 - 65 %    Lymphocytes 21 (L) 24 - 43 %    Monocytes 7 3 - 12 %    Eosinophils % 0 (L) 1 - 4 %    Basophils 1 0 - 2 %    Immature Granulocytes 1 (H) 0 %    Segs Absolute 7.00 1.50 - 8.10 k/uL    Absolute Lymph # 2.03 1.10 - 3.70 k/uL    Absolute Mono # 0.67 0.10 - 1.20 k/uL    Absolute Eos # <0.03 0.00 - 0.44 k/uL    Basophils Absolute 0.05 0.00 - 0.20 k/uL    Absolute Immature Granulocyte 0.05 0.00 - 0.30 k/uL   CMP   Result Value Ref Range    Glucose 97 70 - 99 mg/dL    BUN 9 6 - 20 mg/dL    Creatinine 0.52 0.50 - 0.90 mg/dL    Calcium 10.1 8.6 - 10.4 mg/dL    Sodium 131 (L) 135 - 144 mmol/L    Potassium 3.6 (L) 3.7 - 5.3 mmol/L    Chloride 97 (L) 98 - 107 mmol/L    CO2 25 20 - 31 mmol/L    Anion Gap 9 9 - 17 mmol/L    Alkaline Phosphatase 173 (H) 35 - 104 U/L    ALT 28 5 - 33 U/L    AST 16 <32 U/L    Total Bilirubin 0.52 0.3 - 1.2 mg/dL    Total Protein 7.7 6.4 - 8.3 g/dL    Albumin 4.6 3.5 - 5.2 g/dL    Albumin/Globulin Ratio 1.5 1.0 - 2.5    GFR Non-African American >60 >60 mL/min    GFR African American >60 >60 mL/min    GFR Comment         Lipase   Result Value Ref Range    Lipase 37 13 - 60 U/L   Urinalysis with Microscopic   Result Value Ref Range    Color, UA Yellow Yellow    Turbidity UA Clear Clear    Glucose, Ur NEGATIVE NEGATIVE    Bilirubin Urine NEGATIVE NEGATIVE    Ketones, Urine MODERATE (A) NEGATIVE    Specific Gravity, UA 1.009 1.005 - 1.030    Urine Hgb MODERATE (A) NEGATIVE    pH, UA 6.5 5.0 - 8.0    Protein, UA 1+ (A) NEGATIVE    Urobilinogen, Urine Normal Normal    Nitrite, Urine NEGATIVE NEGATIVE    Leukocyte Esterase, Urine LARGE (A) NEGATIVE    -          WBC, UA 20 TO 50 0 - 5 /HPF    RBC, UA 10 TO 20 0 - 2 /HPF    Casts UA None 0 - 2 /LPF    Epithelial Cells UA 5 TO 10 0 - 5 /HPF    Mucus, UA 1+ (A) None       IMPRESSION: Presents with nausea, vomiting. Obtain labs and UA. If concern for continued UTI will contact urology as patient has upcoming surgery scheduled.      RADIOLOGY:  No orders to display         EKG      All EKG's are interpreted by the Emergency Department Physician who either signs or Co-signs this chart in the absence of a cardiologist.    EMERGENCY DEPARTMENT COURSE:      ED Course as of 07/20/22 2111 Wed Jul 20, 2022   1648 CBC with Diff(!):    WBC 9.8   RBC 4.45   Hemoglobin Quant 13.4   Hematocrit 38.6   MCV 86.7   MCH 30.1   MCHC 34.7   RDW 12.7   Platelet Count 663(!)   MPV 8.4   NRBC Automated 0.0   Seg Neutrophils 70(!)   Lymphocytes 21(!)   Monocytes 7   Eosinophils % 0(!)   Basophils 1   Immature Granulocytes 1(!)   Segs Absolute 7.00   Absolute Lymph # 2.03   Absolute Mono # 0.67   Absolute Eos # <0.03   Basophils Absolute 0.05   Absolute Immature Granulocyte 0.05 [SK]   1703 Patient reevaluated and sleeping comfortably  [SK]   1703 CMP(! ):    GLUCOSE, FASTING,GF 97   BUN,BUNPL 9   Creatinine 0.52   CALCIUM, SERUM, 903176 10.1   Sodium 131(!)   Potassium 3.6(!)   Chloride 97(!)   CO2 25   Anion Gap 9   Alk Phos 173(!)   ALT 28   AST 16   Bilirubin 0.52   Total Protein 7.7   Albumin 4.6   ALBUMIN/GLOBULIN RATIO 1.5   GFR Non-African American >60   GFR  >60   GFR Comment      [SK]   1710 Lipase:    Lipase 37 [SK]   1752 COVID-19, Rapid:    Specimen Description . NASOPHARYNGEAL SWAB   SARS-CoV-2, Rapid Not Detected [SK]   0922 Patient continues to rest comfortably in bed. VSS. [SK]   1958 Patient complaining of nausea. Zofran ordered. [SK]   1958 Called to bedside, patient having \"stress seizure\". Patients father bedside, states she has these regularly and they last for various lengths of time. Dr. Audie Dance bedside and patient responded to painful stimulus. Complains of anxiety and \"she doesn't want to feel like this any more\".   [SK]   2048 Urinalysis with Microscopic(!):    Color, UA Yellow   Turbidity UA Clear   Glucose, UA NEGATIVE   Bilirubin, Urine NEGATIVE   Ketones, Urine MODERATE(!)   Specific [place], UA 1.009   Urine Hgb MODERATE(!)   pH, UA 6.5   Protein, UA 1+(!)   Urobilinogen, Urine Normal   Nitrite, Urine NEGATIVE   Leukocyte Esterase, Urine LARGE(!)   -        WBC, UA 20 TO 50   RBC, UA 10 TO 20   Casts UA None   Epithelial Cells, UA 5 TO 10   Mucus, UA 1+(!) [SK]   2102 Discussed with Urology. Considering hemodynamically stable, labwork WNL, abd exam benign recommend to continue outpatient course of Cipro. Follow up with Urology as outpatient. [SK]      ED Course User Index  [SK] Malena Mendieta DO       No notes of EC Admission Criteria type on file. PROCEDURES:      CONSULTS:  IP CONSULT TO UROLOGY    CRITICAL CARE:      ED Course as of 07/20/22 2111 Wed Jul 20, 2022   1648 CBC with Diff(!):    WBC 9.8   RBC 4.45   Hemoglobin Quant 13.4   Hematocrit 38.6   MCV 86.7   MCH 30.1   MCHC 34.7   RDW 12.7   Platelet Count 877(!)   MPV 8.4   NRBC Automated 0.0   Seg Neutrophils 70(!)   Lymphocytes 21(!)   Monocytes 7   Eosinophils % 0(!)   Basophils 1   Immature Granulocytes 1(!)   Segs Absolute 7.00   Absolute Lymph # 2.03   Absolute Mono # 0.67   Absolute Eos # <0.03   Basophils Absolute 0.05   Absolute Immature Granulocyte 0.05 [SK]   1703 Patient reevaluated and sleeping comfortably  [SK]   1703 CMP(! ):    GLUCOSE, FASTING,GF 97   BUN,BUNPL 9   Creatinine 0.52   CALCIUM, SERUM, 195350 10.1   Sodium 131(!)   Potassium 3.6(!)   Chloride 97(!)   CO2 25   Anion Gap 9   Alk Phos 173(!)   ALT 28   AST 16   Bilirubin 0.52   Total Protein 7.7   Albumin 4.6   ALBUMIN/GLOBULIN RATIO 1.5   GFR Non-African American >60   GFR  >60   GFR Comment      [SK]   1710 Lipase:    Lipase 37 [SK]   1752 COVID-19, Rapid:    Specimen Description . NASOPHARYNGEAL SWAB   SARS-CoV-2, Rapid Not Detected [SK]   3695 Patient continues to rest comfortably in bed. VSS. [SK]   1958 Patient complaining of nausea. Zofran ordered. [SK]   1958 Called to bedside, patient having \"stress seizure\". Patients father bedside, states she has these regularly and they last for various lengths of time. Dr. Ce Gutierres bedside and patient responded to painful stimulus.  Complains of anxiety and \"she doesn't want to feel like this any more\". [SK]   2048 Urinalysis with Microscopic(!):    Color, UA Yellow   Turbidity UA Clear   Glucose, UA NEGATIVE   Bilirubin, Urine NEGATIVE   Ketones, Urine MODERATE(!)   Specific [place], UA 1.009   Urine Hgb MODERATE(!)   pH, UA 6.5   Protein, UA 1+(!)   Urobilinogen, Urine Normal   Nitrite, Urine NEGATIVE   Leukocyte Esterase, Urine LARGE(!)   -        WBC, UA 20 TO 50   RBC, UA 10 TO 20   Casts UA None   Epithelial Cells, UA 5 TO 10   Mucus, UA 1+(!) [SK]   2102 Discussed with Urology. Considering hemodynamically stable, labwork WNL, abd exam benign recommend to continue outpatient course of Cipro. Follow up with Urology as outpatient. [SK]      ED Course User Index  [SK] Vero Taylor DO       FINAL IMPRESSION      1. Generalized abdominal pain    2. Bilious vomiting with nausea    3.  Urinary tract infection associated with catheterization of urinary tract, unspecified indwelling urinary catheter type, initial encounter [facility])          DISPOSITION / PLAN     DISPOSITION Decision To Discharge 07/20/2022 09:05:28 PM      PATIENT REFERRED TO:  [facility] UROLOGY  [address]        DISCHARGE MEDICATIONS:  New Prescriptions    ONDANSETRON (ZOFRAN ODT) 4 MG DISINTEGRATING TABLET    Place 1 tablet under the tongue every 8 hours as needed for Nausea       Vero Taylor DO  Emergency Medicine Resident    (Please note that portions of thisnote were completed with a voice recognition program.  Efforts were made to edit the dictations but occasionally words are mis-transcribed.)        Vero Taylor DO  Resident  07/20/22 6559

## 2022-07-20 NOTE — ED PROVIDER NOTES
Signed out by Rosa Maria Barrios at the completion of her shift. Presenting with c/o nausea/vomiting X3 days. Recent discharge from [facility] unit. Believes she's 'withdrawing' from Cymbalta. W/u in progress. Lab results of been reviewed including the urinalysis which does appear to be consistent with persistent urinary tract infection. Note to be made the patient was demonstrating \"seizure activity\" which ceased immediately upon application of a noxious stimulus to the great toe. I do not believe the patient's \"seizure activity\" actually represented a actual seizure or pseudoseizure activity. We will contact urology service to discuss choice of therapy for the persistent UTI prior to the removal of the bladder stone in several weeks.          Fabi Saenz MD  07/20/22 2759

## 2022-07-20 NOTE — ED TRIAGE NOTES
Pt presents to the ER with nausea and vomiting since Sunday. Pt recently was discharged from [address] [facility] psych unit due to \"dealing with things\". PT also states she has been withdrawing from cymbalta for the last few weeks. Pt states she has some relief when she goes into the shower. That helps the nausea.

## 2022-07-20 NOTE — PROGRESS NOTES
Crystal Araujo in for PAT appointment. Father at appointment also. Pt C/O not feeling well including fatigue, nausea, vomiting. Vital signs obtained as charted. Writer asked if pt would like to stop PAT visit and go to ER. Crystal Araujo states yes she would prefer to go to ER. Writer transported pt to ER per wheelchair. Father went to stay in car and wait.

## 2022-07-21 ENCOUNTER — TELEPHONE (OUTPATIENT)
Dept: UROLOGY | Age: 27
End: 2022-07-21

## 2022-07-21 LAB
CULTURE: NORMAL
EKG ATRIAL RATE: 72 BPM
EKG P AXIS: 28 DEGREES
EKG P-R INTERVAL: 146 MS
EKG Q-T INTERVAL: 414 MS
EKG QRS DURATION: 92 MS
EKG QTC CALCULATION (BAZETT): 453 MS
EKG R AXIS: 68 DEGREES
EKG T AXIS: 32 DEGREES
EKG VENTRICULAR RATE: 72 BPM
SPECIMEN DESCRIPTION: NORMAL

## 2022-07-21 PROCEDURE — 93010 ELECTROCARDIOGRAM REPORT: CPT | Performed by: INTERNAL MEDICINE

## 2022-07-21 NOTE — TELEPHONE ENCOUNTER
Received call from [facility] Pre-op processing. .. stated pt came to her appt on 7/20/2022 but her PAT was not completed bc pt was sent to ER for nausea and vomiting. .. PAT has been unable to reach patient to r/s    ----------------     Called patient to get her r/s for missed PAT appt. .. pt has been r/s for 7/28/2022 @ 2:30pm at [facility]    At the end of the conversation the patient stated she was still not feeling well, stated she has severe and extreme nausea that only goes away when she is in the shower, some vomiting, cant keep any food or water in her, abdominal pain and feels very shaky. .. advised patient to go to the nearest ER. .. pt stated she was seen at [facility] ER on 7/20/2022 but doesn't think they took her serious and all she wants is IV hydration. .. patient again advised to go to the nearest ER for evaluation and treatment. Pt verbalized an understanding and stated she would go to the hospital after she took a shower.

## 2022-07-21 NOTE — DISCHARGE INSTRUCTIONS
Please return to emergency department with increasing pain, nausea, vomiting, fever, chills, feelings of hurting yourself or someone else. Follow-up with urology as outpatient. Please continue to take Cipro as directed. Zofran for nausea.

## 2022-07-22 RX ORDER — SODIUM CHLORIDE, SODIUM LACTATE, POTASSIUM CHLORIDE, CALCIUM CHLORIDE 600; 310; 30; 20 MG/100ML; MG/100ML; MG/100ML; MG/100ML
1000 INJECTION, SOLUTION INTRAVENOUS CONTINUOUS
Status: CANCELLED | OUTPATIENT
Start: 2022-07-22

## 2022-07-22 NOTE — DISCHARGE INSTRUCTIONS
Pre-operative Instructions    Please arrive at the surgery center by 9:30 AM on 8/3/2022  (or as directed by your surgeon's office). See Directons to Surgery Center below. FASTING    NOTHING TO EAT OR DRINK AFTER MIDNIGHT the night prior to surgery (This includes gum, candy, mints, chewing tobacco, etc). (Follow bowel prep instructions if instructed by your surgeon.)                MEDICATIONS    What to STOP: ANY BLOOD THINNING MEDICATION(S) as directed by your surgeon or prescribing physician. FAILURE TO STOP CERTAIN MEDICATIONS MAY INTERFERE WITH YOUR SCHEDULED SURGERY. According to the medication list you provided today, PLEASE STOP: ibuprofen (Motrin)    2. What to CONTINUE leading up to your surgery:   Please take all your other daily medications except the medications listed above that you were instructed to hold. 3. What to TAKE MORNING OF SURGERY with SMALL SIP OF WATER: clobazam (Tereza Rack), trileptal, prazosin (Minipress), famotidine (Pepcid)                       IF APPLICABLE:  -If you have been given a blood band, you must bring it with you the day of surgery, unclasped.  -Use routine inhalers and bring inhalers the day of surgery.   -Bring C-Pap/Bi-pap with you morning of surgery if planning on staying in the hospital overnight.  -Do not take diabetic medications on the day of surgery. OTHER IMPORTANT REMINDERS    1) Please REMEMBER to get Covid-19 Screening if scheduled    2) You may be required to provide a urine sample upon your arrival to the pre-op area, so please take this into consideration. 3) If  NOT planning on staying in the hospital overnight : A. You will need an adult family member /friend to drive you home after your procedure.  Taxi cabs or any form of public transportation ALONE is not acceptable.   -Your  must be 25years of age or older and able to sign off on your discharge instructions.     -It is preferable that the friend or family member stay at the hospital throughout your procedure. Kevan Patel must remain with you once you have arrived home for the first 24 hours after your surgery if you receive anesthesia or medication. If you do not have someone to stay with you, your procedure may be cancelled. 4) Do not wear any jewelry or body piercings day of surgery. 5) In case of illness - If you have cold or flu like symptoms (high fever, runny nose, sore throat, cough, etc.) rash, nausea, vomiting, loose stools, and/or recent contact with someone who has a contagious disease (Covid-19, chicken pox, measles, etc.) PLEASE notify your surgeon as soon as possible. 7/22/22  11:06 AM      ___________________  _______________________  Signature (Provider)              Signature (Patient)     Day of Surgery/Procedure    As a patient at [address] [place] [address] you can expect quality medical and nursing care that is centered on your individual needs. Our goal is to make your surgical experience as comfortable as possible  . Directions to the [address] [place] [address] is located at [address] [facility], [address] [facility]. Please pull into the Emergency/Surgery Center parking lot or there is additional parking across the street. You will enter the facility under through the glass doors and proceed to registration check-in which is right inside the door. Thereafter you will be directed to the [address] [place] [address]. Patient Instructions    ·Please shower the night before and the morning of surgery with an antibacterial soap. Please use the cleaning solution (bottle) given to you the night before your surgery after your shower. Unless otherwise told by your physician, please do not shave legs or any part of your body below your neck the night before or day of your surgery. You may shave your face or neck. ·Please wear loose, comfortable clothing.   If you are potentially going to have a cast or brace bring clothing that will fit over them. ·Bring a list of all medications you take, along with the dose of the medications and how often you take it. If more convenient bring the pharmacy bottles in a zip lock bag. ·Brush your teeth but do not swallow water. ·Bring your eyeglasses and case with you. No contacts are to be worn the day of surgery. You also may bring your hearing aids. ·Do not bring any valuables, such as jewelry, cash or credit cards. If you are staying overnight with us, please bring a SMALL bag of personal items. We cannot accommodate large items, like suitcases. ·If your child is having surgery please make arrangements for any other children to be cared for at home on the day of surgery. Other children are not permitted in recovery room and we want you to be able to spend time with the patient. If other arrangements are not available then we suggest that you have a second adult to stay in the waiting room. ·If you are having any type of anesthesia you are to have nothing to eat or drink after midnight the night before your surgery. This includes gum, mints, water or smoking or chewing tobacco.  The only exception to this is a small sip of water to take with any morning dose of heart, blood pressure, or seizure medications. ·Bring your inhaler if you are currently using one. ·Bring your blood band if one has been given to you. Please do not close the clasp. ·If you are on C-PAP or Bi-PAP at home and plan on staying in the hospital overnight for your surgery please bring the machine with you. ·Do not wear any jewelry or body piercings day of surgery. Also, NO lotion, perfume or deodorant to be used the day of surgery. If you have any other questions regarding your procedure/surgery please call  your surgeon's office.      If you have a last minute question(s) the DAY OF your surgery, you may call [phone]

## 2022-07-25 ENCOUNTER — TELEPHONE (OUTPATIENT)
Dept: UROLOGY | Age: 27
End: 2022-07-25

## 2022-07-25 DIAGNOSIS — N20.0 KIDNEY STONE: Primary | ICD-10-CM

## 2022-07-25 NOTE — TELEPHONE ENCOUNTER
Patient was to have surgery on 8/3/2022 for stone treatment but passed the stone, surgery has been cancelled. .. Dr. Sun Pluido would like pt to be seen in 6 weeks w/ US prior. .. can you please put an order for an [address] in? Let me know when its ready and I will call the patient. .. thanks :)

## 2022-07-25 NOTE — TELEPHONE ENCOUNTER
Patient notified that surgery has been cancelled and that Dr. Erich Hughes would like to see her back in 6 weeks w/ US prior. .. pt scheduled for 9/12/2022 @ 3:00pm for follow up. .. note sent to MA for US order, will call patient when order is ready

## 2022-07-28 ENCOUNTER — HOSPITAL ENCOUNTER (OUTPATIENT)
Dept: PREADMISSION TESTING | Age: 27
Discharge: HOME | End: 2022-07-28

## 2022-08-01 NOTE — TELEPHONE ENCOUNTER
LM for patient that US order is ready & was mailed to her. Reminded patient to have [address] done 1-2 weeks prior to 6 week f/u on 9/12/2022.

## 2022-09-08 ENCOUNTER — TELEPHONE (OUTPATIENT)
Dept: UROLOGY | Age: 27
End: 2022-09-08

## 2022-09-08 NOTE — TELEPHONE ENCOUNTER
Writer left message for patient to return call. Patient left message on clinic line in regards to [address].

## 2022-09-09 ENCOUNTER — TELEPHONE (OUTPATIENT)
Dept: UROLOGY | Age: 27
End: 2022-09-09

## 2022-09-09 DIAGNOSIS — R30.0 DYSURIA: Primary | ICD-10-CM

## 2022-09-09 NOTE — TELEPHONE ENCOUNTER
Writer contacted patient in regards to appt that was scheduled for 9/12/22 with Fredy Cottrell. US was not completed so appt was cancelled. Patient then stated \"Yesterday when I self catheterized myself there was a lot of blood. I don't know if I have an infection. Since then the blood has cleared. My urine looks normal again. Patient was advised to give a urine sample to rule out infection. She will go to [facility]. Orders faxed. Patient did send pictures of urine through [email or web address].  Will notify Fredy Cottrell

## 2022-09-15 NOTE — TELEPHONE ENCOUNTER
Per  patient will need to complete urine c&s as well as an US. She is to f/u in office once completed.

## 2022-10-17 ENCOUNTER — OFFICE VISIT (OUTPATIENT)
Dept: UROLOGY | Age: 27
End: 2022-10-17
Payer: COMMERCIAL

## 2022-10-17 VITALS — BODY MASS INDEX: 35.53 KG/M2 | HEIGHT: 60 IN | TEMPERATURE: 98 F | WEIGHT: 181 LBS

## 2022-10-17 DIAGNOSIS — N31.9 NEUROGENIC BLADDER: Primary | ICD-10-CM

## 2022-10-17 DIAGNOSIS — N20.0 KIDNEY STONE: ICD-10-CM

## 2022-10-17 DIAGNOSIS — R39.89 URETHRAL PAIN: ICD-10-CM

## 2022-10-17 DIAGNOSIS — N21.0 BLADDER STONE: ICD-10-CM

## 2022-10-17 PROCEDURE — 99214 OFFICE O/P EST MOD 30 MIN: CPT | Performed by: UROLOGY

## 2022-10-17 ASSESSMENT — ENCOUNTER SYMPTOMS
NAUSEA: 1
SHORTNESS OF BREATH: 0
WHEEZING: 0
CONSTIPATION: 0
COUGH: 0
EYE PAIN: 0
EYE REDNESS: 0
ABDOMINAL PAIN: 0
VOMITING: 0
BACK PAIN: 0

## 2022-10-17 NOTE — PROGRESS NOTES
Review of Systems   Constitutional:  Positive for fatigue. Negative for chills and fever. Eyes:  Negative for pain, redness and visual disturbance. Respiratory:  Negative for cough, shortness of breath and wheezing. Cardiovascular:  Negative for chest pain and leg swelling. Gastrointestinal:  Positive for nausea. Negative for abdominal pain, constipation and vomiting. Genitourinary:  Negative for difficulty urinating, dysuria, flank pain, frequency, hematuria and urgency. Musculoskeletal:  Negative for back pain, joint swelling and myalgias. Skin:  Negative for rash and wound. Neurological:  Negative for dizziness, tremors and numbness. Hematological:  Does not bruise/bleed easily.
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
## 2022-10-24 ENCOUNTER — TELEPHONE (OUTPATIENT)
Dept: UROLOGY | Age: 27
End: 2022-10-24

## 2022-10-24 ENCOUNTER — TELEPHONE (OUTPATIENT)
Dept: NEUROLOGY | Age: 27
End: 2022-10-24

## 2022-10-24 NOTE — TELEPHONE ENCOUNTER
Received call back from patient. .. she is tentatively scheduled for 11/11/2022 @ 1:45pm at [facility]. .. Teo Fernandez will call back with all surgery details once procedure has been scheduled.

## 2022-10-24 NOTE — TELEPHONE ENCOUNTER
Sunitha litholaguera @ ST 11/11/22 1:45pm   PAT @ ST 10/27/22 2:00pm             Spoke with patient, procedure info emailed.

## 2022-10-24 NOTE — TELEPHONE ENCOUNTER
Attempted to contact patient for procedure scheduling. Left voicemail for patient to call back for procedure scheduling.              Cysto, litholapaxy 45min , general, need HLL

## 2022-10-25 RX ORDER — SODIUM CHLORIDE, SODIUM LACTATE, POTASSIUM CHLORIDE, CALCIUM CHLORIDE 600; 310; 30; 20 MG/100ML; MG/100ML; MG/100ML; MG/100ML
1000 INJECTION, SOLUTION INTRAVENOUS CONTINUOUS
Status: CANCELLED | OUTPATIENT
Start: 2022-10-25

## 2022-10-25 NOTE — TELEPHONE ENCOUNTER
Please advise if Arthtiffanie Zelaya can have a repeat cysto surgery as done before in 06/2022.  Thanks

## 2022-10-25 NOTE — TELEPHONE ENCOUNTER
Severiano Aleman, please add the below statement to the clearance form and have signed, fax back to Urology F: [phone]. Thanks!

## 2022-10-25 NOTE — TELEPHONE ENCOUNTER
She is cleared as long as she is taking her seizure medications. All antiepileptic doses should be given on time even if n.p.o. for surgery. While it is not possible to predict if or when this patient will have a seizure, the patient should remain on all seizure medications.

## 2022-10-25 NOTE — DISCHARGE INSTRUCTIONS
Pre-operative Instructions    Please arrive at the surgery center by 11:40 AM on 11/11/2022  (or as directed by your surgeon's office). See Directons to Surgery Center below. FASTING    NOTHING TO EAT OR DRINK AFTER MIDNIGHT the night prior to surgery (This includes gum, candy, mints, chewing tobacco, etc). MEDICATIONS    What to STOP: ANY BLOOD THINNING MEDICATION(S) as directed by your surgeon or prescribing physician. FAILURE TO STOP CERTAIN MEDICATIONS MAY INTERFERE WITH YOUR SCHEDULED SURGERY. According to the medication list you provided today, PLEASE STOP:     2. What to CONTINUE leading up to your surgery:   Please take all your other daily medications except the medications listed above that you were instructed to hold. 3. What to [place] with SMALL SIP OF WATER: prazosin (Minipres), clobazam (Onfi), oxycarbazepine (Trileptal), famotidine (Pepcid)                       IF APPLICABLE:  -If you have been given a blood band, you must bring it with you the day of surgery, unclasped.  -Use routine inhalers and bring inhalers the day of surgery.   -Bring C-Pap/Bi-pap with you morning of surgery if planning on staying in the hospital overnight.  -Do not take diabetic medications on the day of surgery. OTHER IMPORTANT REMINDERS    1) Please REMEMBER to get Covid-19 Screening if scheduled    2) You may be required to provide a urine sample upon your arrival to the pre-op area, so please take this into consideration. 3) If  NOT planning on staying in the hospital overnight : A. You will need an adult family member /friend to drive you home after your procedure.  Taxi cabs or any form of public transportation ALONE is not acceptable.   -Your  must be 25years of age or older and able to sign off on your discharge instructions.     -It is preferable that the friend or family member stay at the hospital throughout your procedure. Agata Bahena must remain with you once you have arrived home for the first 24 hours after your surgery if you receive anesthesia or medication. If you do not have someone to stay with you, your procedure may be cancelled. 4) Do not wear any jewelry or body piercings day of surgery. 5) In case of illness - If you have cold or flu like symptoms (high fever, runny nose, sore throat, cough, etc.) rash, nausea, vomiting, loose stools, and/or recent contact with someone who has a contagious disease (Covid-19, chicken pox, measles, etc.) PLEASE notify your surgeon as soon as possible. 10/25/22  3:18 PM      ___________________  _______________________  Signature (Provider)              Signature (Patient)     Day of Surgery/Procedure    As a patient at [address] you can expect quality medical and nursing care that is centered on your individual needs. Our goal is to make your surgical experience as comfortable as possible  . Directions to the [address] [place] [address] is located at [address] [facility], [address] [facility]. Please pull into the Emergency/Surgery Center parking lot or there is additional parking across the street. You will enter the facility under through the glass doors and proceed to registration check-in which is right inside the door. Thereafter you will be directed to the [address] [place] [address]. Patient Instructions    ·Please shower the night before and the morning of surgery with an antibacterial soap. Please use the cleaning solution (bottle) given to you the night before your surgery after your shower. Unless otherwise told by your physician, please do not shave legs or any part of your body below your neck the night before or day of your surgery. You may shave your face or neck. ·Please wear loose, comfortable clothing.   If you are potentially going to have a cast or brace bring clothing that will fit over them.      Joby Oas a list of all medications you take, along with the dose of the medications and how often you take it. If more convenient bring the pharmacy bottles in a zip lock bag. ·Brush your teeth but do not swallow water. ·Bring your eyeglasses and case with you. No contacts are to be worn the day of surgery. You also may bring your hearing aids. ·Do not bring any valuables, such as jewelry, cash or credit cards. If you are staying overnight with us, please bring a SMALL bag of personal items. We cannot accommodate large items, like suitcases. ·If your child is having surgery please make arrangements for any other children to be cared for at home on the day of surgery. Other children are not permitted in recovery room and we want you to be able to spend time with the patient. If other arrangements are not available then we suggest that you have a second adult to stay in the waiting room. ·If you are having any type of anesthesia you are to have nothing to eat or drink after midnight the night before your surgery. This includes gum, mints, water or smoking or chewing tobacco.  The only exception to this is a small sip of water to take with any morning dose of heart, blood pressure, or seizure medications. ·Bring your inhaler if you are currently using one. ·Bring your blood band if one has been given to you. Please do not close the clasp. ·If you are on C-PAP or Bi-PAP at home and plan on staying in the hospital overnight for your surgery please bring the machine with you. ·Do not wear any jewelry or body piercings day of surgery. Also, NO lotion, perfume or deodorant to be used the day of surgery. If you have any other questions regarding your procedure/surgery please call  your surgeon's office.      If you have a last minute question(s) the DAY OF your surgery, you may call [phone]

## 2022-10-27 ENCOUNTER — HOSPITAL ENCOUNTER (OUTPATIENT)
Dept: PREADMISSION TESTING | Age: 27
Discharge: HOME | End: 2022-10-31
Payer: COMMERCIAL

## 2022-10-27 VITALS
SYSTOLIC BLOOD PRESSURE: 126 MMHG | HEART RATE: 84 BPM | BODY MASS INDEX: 37.3 KG/M2 | TEMPERATURE: 97.3 F | HEIGHT: 60 IN | RESPIRATION RATE: 20 BRPM | DIASTOLIC BLOOD PRESSURE: 68 MMHG | WEIGHT: 190 LBS

## 2022-10-27 LAB
ANION GAP SERPL CALCULATED.3IONS-SCNC: 16 MMOL/L (ref 9–17)
BUN BLDV-MCNC: 14 MG/DL (ref 6–20)
CHLORIDE BLD-SCNC: 98 MMOL/L (ref 98–107)
CO2: 24 MMOL/L (ref 20–31)
CREAT SERPL-MCNC: 0.39 MG/DL (ref 0.5–0.9)
GFR SERPL CREATININE-BSD FRML MDRD: >60 ML/MIN/1.73M2
GLUCOSE BLD-MCNC: 94 MG/DL (ref 70–99)
HCT VFR BLD CALC: 39.9 % (ref 36.3–47.1)
HEMOGLOBIN: 12.6 G/DL (ref 11.9–15.1)
POTASSIUM SERPL-SCNC: 3.8 MMOL/L (ref 3.7–5.3)
SODIUM BLD-SCNC: 138 MMOL/L (ref 135–144)

## 2022-10-27 PROCEDURE — 80051 ELECTROLYTE PANEL: CPT

## 2022-10-27 PROCEDURE — 85014 HEMATOCRIT: CPT

## 2022-10-27 PROCEDURE — 85018 HEMOGLOBIN: CPT

## 2022-10-27 PROCEDURE — 87086 URINE CULTURE/COLONY COUNT: CPT

## 2022-10-27 PROCEDURE — 93005 ELECTROCARDIOGRAM TRACING: CPT | Performed by: ANESTHESIOLOGY

## 2022-10-27 PROCEDURE — 84520 ASSAY OF UREA NITROGEN: CPT

## 2022-10-27 PROCEDURE — 82947 ASSAY GLUCOSE BLOOD QUANT: CPT

## 2022-10-27 PROCEDURE — 36415 COLL VENOUS BLD VENIPUNCTURE: CPT

## 2022-10-27 PROCEDURE — 82565 ASSAY OF CREATININE: CPT

## 2022-10-27 RX ORDER — CITALOPRAM 10 MG/1
TABLET ORAL
COMMUNITY
Start: 2022-10-14

## 2022-10-27 RX ORDER — ZIPRASIDONE HYDROCHLORIDE 20 MG/1
40 CAPSULE ORAL NIGHTLY
COMMUNITY

## 2022-10-27 RX ORDER — DIPHENHYDRAMINE HCL 25 MG
25 TABLET ORAL EVERY 6 HOURS PRN
COMMUNITY

## 2022-10-27 RX ORDER — ZOLPIDEM TARTRATE 5 MG/1
5 TABLET ORAL NIGHTLY
COMMUNITY

## 2022-10-27 ASSESSMENT — PAIN SCALES - GENERAL: PAINLEVEL_OUTOF10: 3

## 2022-10-27 ASSESSMENT — PAIN DESCRIPTION - FREQUENCY: FREQUENCY: CONTINUOUS

## 2022-10-27 ASSESSMENT — PAIN DESCRIPTION - PAIN TYPE: TYPE: ACUTE PAIN

## 2022-10-27 ASSESSMENT — PAIN DESCRIPTION - DESCRIPTORS: DESCRIPTORS: SHARP

## 2022-10-27 ASSESSMENT — PAIN DESCRIPTION - LOCATION: LOCATION: VAGINA

## 2022-10-27 NOTE — PROGRESS NOTES
Anesthesia Focused Assessment    Hx of anesthesia complications:  no; patient stresses she does not wish to have anesthesia through mask, extremely claustrophobic  Family hx of anesthesia complications:  no      Prior + Covid-19 test? no        STOP-BANG Sleep Apnea Questionnaire    SNORE loudly (heard through closed doors)? Yes  TIRED, fatigued, sleepy during daytime? No  OBSERVED stopping breathing during sleep? Yes  High blood PRESSURE or being treated? No    BMI over 35? Yes  AGE over 48? No  NECK circumference over 16\"? No  GENDER (male)? No             Total 3  High risk 5-8  Intermediate risk 3-4  Low risk 0-2    ----------------------------------------------------------------------------------------------------------------------  LOLA                              Yes  If yes, machine? No, cannot tolerate    DM1                                            No  DM2                   No    Coronary Artery Disease      No  HTN         No  Defib/AICD/Pacemaker               No             Renal Failure                   No  If yes, on dialysis           Active smoker? Yes, vapes nicotine daily  Drinks alcohol? Yes, rarely  Illicit drugs?         Yes, THC daily  Dentition?        benign      Past Medical History:   Diagnosis Date    ADD (attention deficit disorder)     Anxiety     Bipolar disorder ([address] 10/27/2022    Claustrophobia     severe, per patient    Depression     Difficult intravenous access 10/27/2022    Fear of 10/27/2022    ANESTHESIA MASK - WILL HAVE PANIC ATTACK    H/O nephrolithotomy with removal of calculi 10/18/2019    at [facility] via Dr. Nick Olivera    History of suicidal ideation 07/20/2022    hospitalization july 2022-information from care everywhere    Hydrocephalus [facility]) with V-P shunt    Hydronephrosis     right sided r/t kidney stone    Intermittent self-catheterization of bladder 07/20/2022    through mitroffanoff/#12 fr catheters tid/qid    Internal tibial torsion     Kidney stone     MDRO (multiple drug resistant organisms) resistance 03/2013    E. Coli urine    Mitrofanoff appendicovesicostomy present ([facility]     Myelomeningocele ([address] L5, S1 level    Neurogenic bladder     PTSD (post-traumatic stress disorder)     Pyelonephritis 03/2004    Seizure disorder ([facility] 10/27/2022    last seizure approx 9/2022    Sleep apnea 10/27/2022    Does not use CPAP due to claustraphobia    Spina bifida ([facility]     Under care of team 10/27/2022    NEUROLOGY - DR. GERARD - next visit 10/31/22 for surgery clearance    Under care of team 10/27/2022    [address] every Wed. Under care of team 10/27/2022    PSYCHIATRY - [place], [place] - sees monthly    Under care of team 10/27/2022    UROLOGY - DR. STOCK    Wellness examination 10/27/2022    PCP -  [address] [place] [address] - next visit 10/28/22         Patient was evaluated in PAT & anesthesia guidelines were applied. NPO guidelines, medication instructions and scheduled arrival time were reviewed with patient. Anesthesia contacted:   no    Medical or cardiac clearance ordered: no, neurology clearance pending, most recent office notes from 2/2022.     SHANNAN Gates - CNP   10/27/22  3:44 PM

## 2022-10-27 NOTE — H&P
History and Physical    Pt Name: Clifton Goldstein  MRN: [id number]  YOB: 1995  Date of evaluation: 10/27/2022  Primary Care Physician: Mary Jane Faustin MD    SUBJECTIVE:   History of Chief Complaint:    Clifton Goldstein is a 32 y.o. female who presents for PAT appointment. Patient complains of urinary frequency, dysuria, and vaginal/suprapubic pain for the last several weeks. Patient continues to self-cath through her mitrofanoff, which is patient and working properly per patient. She was recently found to have a bladder stone. She states she was able to pass a fragment of the stone last month. Patient has been scheduled for HOLMIUM LASER, CYSTOSCOPY LITHOLAPAXY  Allergies  is allergic to latex, furadantin [nitrofurantoin], banana, kiwi extract, phenobarbital, and sulfa antibiotics. Medications  Prior to Admission medications    Medication Sig Start Date End Date Taking? Authorizing Provider   zolpidem (AMBIEN) 5 MG tablet Take 5 mg by mouth nightly.    Yes Historical Provider, MD   ziprasidone (GEODON) 20 MG capsule Take 40 mg by mouth nightly   Yes Historical Provider, MD   diphenhydrAMINE (BENADRYL) 25 MG tablet Take 25 mg by mouth every 6 hours as needed for Itching   Yes Historical Provider, MD   citalopram (CELEXA) 10 MG tablet take 1 tablet by mouth once daily 10/14/22   Historical Provider, MD   cloBAZam (ONFI) 10 MG TABS tablet take 1 tablet by mouth once daily 6/20/22 12/20/22  Melba Ballesteros MD   OXcarbazepine (TRILEPTAL) 300 MG tablet Take 2 tablets by mouth 2 times daily 2/14/22 2/28/23  Melba Ballesteros MD   estradiol (VIVELLE) 0.1 MG/24HR Place 1 patch onto the skin Twice a Week  1/11/22   Historical Provider, MD   brexpiprazole (REXULTI) 1 MG TABS tablet Take 1 mg by mouth daily    Historical Provider, MD   busPIRone (BUSPAR) 15 MG tablet Take 15 mg by mouth 2 times daily     Historical Provider, MD   prazosin (MINIPRESS) 1 MG capsule Take 1 mg by mouth nightly 12/13/18   Historical Provider, MD   escitalopram (LEXAPRO) 20 MG tablet Take 1 tablet by mouth daily  Patient not taking: Reported on 10/27/2022 1/1/19   Historical Provider, MD   QUEtiapine (SEROQUEL) 25 MG tablet Take 200 mg by mouth nightly   Patient not taking: Reported on 10/27/2022 12/29/18   Historical Provider, MD   famotidine (PEPCID) 20 MG tablet take 1 tablet by mouth twice a day 1/13/17   Historical Provider, MD   ondansetron (ZOFRAN-ODT) 4 MG disintegrating tablet Take 4 mg by mouth every 8 hours as needed for Nausea or Vomiting    Historical Provider, MD   ibuprofen (ADVIL;MOTRIN) 800 MG tablet Take 1 tablet by mouth every 8 hours as needed (pain). 2/11/14   Lashonda Hunter DO   cetirizine (ZYRTEC) 10 MG tablet Take 10 mg by mouth daily as needed     Historical Provider, MD     Past Medical History    has a past medical history of ADD (attention deficit disorder), Anxiety, Bipolar disorder ([address] Claustrophobia, Depression, Difficult intravenous access, Fear of, H/O nephrolithotomy with removal of calculi, History of suicidal ideation, Hydrocephalus ([address] Hydronephrosis, Intermittent self-catheterization of bladder, Internal tibial torsion, Kidney stone, MDRO (multiple drug resistant organisms) resistance, Mitrofanoff appendicovesicostomy present ([address] Myelomeningocele ([address] Neurogenic bladder, PTSD (post-traumatic stress disorder), Pyelonephritis, Seizure disorder ([address] Sleep apnea, Spina bifida ([address] Under care of team, Under care of team, Under care of team, Under care of team, and Wellness examination. Past Surgical History   has a past surgical history that includes Ventriculoperitoneal shunt (9/12/93; revised 3/16/96; 1/94; 9/93; programmable shunt 4/08);  Bladder surgery (7/99 S/P urethral lengehtening and reimplantation with  bladder augmentation; revised 1/00); osteotomy (4/00, 12/94 bilateral distal tibial/fibula fib derotational osteotomy); other surgical history (7/01 [place] tendon transfer); Growth plate surgery (10/05, stapled growth plate bilateral knees); osteotomy (Left, 2007); Hysterectomy, vaginal (02/10/2014); back surgery (06/10/2014); Cystoscopy (11/04/2014); Vaginal prolapse repair (07/2016); Cystoscopy (Right, 09/23/2019); Nephrostomy (Right, 10/02/2019); cysto/uretero/pyeloscopy, calculus tx (Right, 10/02/2019); and NEPHROLITHOTOMY (Right, 10/18/2019). Social History   reports that she quit smoking about 21 months ago. Her smoking use included cigarettes. She has a 1.50 pack-year smoking history. She has never used smokeless tobacco.    reports current alcohol use. reports current drug use. Drug: Marijuana Liliane Hikes). Marital Status single  Children none  Occupation none  Family History  Family Status   Relation Name Status    Mother  Alive    Father  Alive     family history includes High Blood Pressure in her father and mother; Other in her mother. Review of Systems:  CONSTITUTIONAL:   negative for fevers, chills, fatigue and malaise    EYES:   negative for double vision, blurred vision and photophobia    HEENT:   negative for tinnitus, epistaxis and sore throat     RESPIRATORY:   negative for cough, shortness of breath, wheezing     CARDIOVASCULAR:   negative for chest pain, palpitations, syncope, edema     GASTROINTESTINAL:   negative for nausea, vomiting     GENITOURINARY:   Urinary frequency, dysuria, suprapubic pain   MUSCULOSKELETAL:   negative for neck or back pain     NEUROLOGICAL:   Negative for weakness and tingling  negative for headaches and dizziness     PSYCHIATRIC:   negative for anxiety       OBJECTIVE:   VITALS:  height is 5' (1.524 m) and weight is 190 lb (86.2 kg). Her temporal temperature is 97.3 °F (36.3 °C). Her blood pressure is 126/68 and her pulse is 84. Her respiration is 20. CONSTITUTIONAL:alert & oriented x 3, no acute distress. Calm and pleasant. SKIN:  Warm and dry, no rashes to exposed areas of skin. HEAD:  Normocephalic, atraumatic. EYES: PERRL. EOMs intact.   EARS: Intact and equal bilaterally. Hearing grossly WNL. NOSE:  Nares patent. No rhinorrhea   MOUTH/THROAT:  Mucous membranes pink and moist, teeth appear to be intact. NECK:supple, good ROM  LUNGS: Respirations even and non-labored. Clear to auscultation bilaterally, no wheezes, rales, or rhonchi. CARDIOVASCULAR: Regular rate and rhythm, no murmurs. ABDOMEN: soft, non-tender, non-distended, bowel sounds active x 4   EXTREMITIES: 1+ edema to bilateral lower extremities. No varicosities to bilateral lower extremities. NEUROLOGIC: CN II-XII are grossly intact. Gait is smooth. Testing:   EKG: 10/27/2022  Labs pending: drawn 10/27/2022   IMPRESSIONS:   Bladder stone. PLANS:   HOLMIUM LASER, CYSTOSCOPY LITHOLAPAXY.     SHANNAN Storm CNP  Electronically signed 10/27/2022 at 3:41 PM

## 2022-10-27 NOTE — H&P (VIEW-ONLY)
History and Physical    Pt Name: Collins Ladd  MRN: [id number]  YOB: 1995  Date of evaluation: 10/27/2022  Primary Care Physician: Beth Byrne MD    SUBJECTIVE:   History of Chief Complaint:    Collins Ladd is a 32 y.o. female who presents for PAT appointment. Patient complains of urinary frequency, dysuria, and vaginal/suprapubic pain for the last several weeks. Patient continues to self-cath through her mitrofanoff, which is patient and working properly per patient. She was recently found to have a bladder stone. She states she was able to pass a fragment of the stone last month. Patient has been scheduled for HOLMIUM LASER, CYSTOSCOPY LITHOLAPAXY  Allergies  is allergic to latex, furadantin [nitrofurantoin], banana, kiwi extract, phenobarbital, and sulfa antibiotics. Medications  Prior to Admission medications    Medication Sig Start Date End Date Taking? Authorizing Provider   zolpidem (AMBIEN) 5 MG tablet Take 5 mg by mouth nightly.    Yes Historical Provider, MD   ziprasidone (GEODON) 20 MG capsule Take 40 mg by mouth nightly   Yes Historical Provider, MD   diphenhydrAMINE (BENADRYL) 25 MG tablet Take 25 mg by mouth every 6 hours as needed for Itching   Yes Historical Provider, MD   citalopram (CELEXA) 10 MG tablet take 1 tablet by mouth once daily 10/14/22   Historical Provider, MD   cloBAZam (ONFI) 10 MG TABS tablet take 1 tablet by mouth once daily 6/20/22 12/20/22  Gertrudis Willett MD   OXcarbazepine (TRILEPTAL) 300 MG tablet Take 2 tablets by mouth 2 times daily 2/14/22 2/28/23  Gertrudis Willett MD   estradiol (VIVELLE) 0.1 MG/24HR Place 1 patch onto the skin Twice a Week  1/11/22   Historical Provider, MD   brexpiprazole (REXULTI) 1 MG TABS tablet Take 1 mg by mouth daily    Historical Provider, MD   busPIRone (BUSPAR) 15 MG tablet Take 15 mg by mouth 2 times daily     Historical Provider, MD   prazosin (MINIPRESS) 1 MG capsule Take 1 mg by mouth nightly 12/13/18   Historical Provider, MD   escitalopram (LEXAPRO) 20 MG tablet Take 1 tablet by mouth daily  Patient not taking: Reported on 10/27/2022 1/1/19   Historical Provider, MD   QUEtiapine (SEROQUEL) 25 MG tablet Take 200 mg by mouth nightly   Patient not taking: Reported on 10/27/2022 12/29/18   Historical Provider, MD   famotidine (PEPCID) 20 MG tablet take 1 tablet by mouth twice a day 1/13/17   Historical Provider, MD   ondansetron (ZOFRAN-ODT) 4 MG disintegrating tablet Take 4 mg by mouth every 8 hours as needed for Nausea or Vomiting    Historical Provider, MD   ibuprofen (ADVIL;MOTRIN) 800 MG tablet Take 1 tablet by mouth every 8 hours as needed (pain). 2/11/14   [facility] Jonathan,    cetirizine (ZYRTEC) 10 MG tablet Take 10 mg by mouth daily as needed     Historical Provider, MD     Past Medical History    has a past medical history of ADD (attention deficit disorder), Anxiety, Bipolar disorder ([address] Claustrophobia, Depression, Difficult intravenous access, Fear of, H/O nephrolithotomy with removal of calculi, History of suicidal ideation, Hydrocephalus ([address] Hydronephrosis, Intermittent self-catheterization of bladder, Internal tibial torsion, Kidney stone, MDRO (multiple drug resistant organisms) resistance, Mitrofanoff appendicovesicostomy present ([address] Myelomeningocele ([address] Neurogenic bladder, PTSD (post-traumatic stress disorder), Pyelonephritis, Seizure disorder ([address] Sleep apnea, Spina bifida ([address] Under care of team, Under care of team, Under care of team, Under care of team, and Wellness examination. Past Surgical History   has a past surgical history that includes Ventriculoperitoneal shunt (9/12/93; revised 3/16/96; 1/94; 9/93; programmable shunt 4/08);  Bladder surgery (7/99 S/P urethral lengehtening and reimplantation with  bladder augmentation; revised 1/00); osteotomy (4/00, 12/94 bilateral distal tibial/fibula fib derotational osteotomy); other surgical history (7/01 [place] tendon transfer); Growth plate surgery (10/05, stapled growth plate bilateral knees); osteotomy (Left, 2007); Hysterectomy, vaginal (02/10/2014); back surgery (06/10/2014); Cystoscopy (11/04/2014); Vaginal prolapse repair (07/2016); Cystoscopy (Right, 09/23/2019); Nephrostomy (Right, 10/02/2019); cysto/uretero/pyeloscopy, calculus tx (Right, 10/02/2019); and NEPHROLITHOTOMY (Right, 10/18/2019). Social History   reports that she quit smoking about 21 months ago. Her smoking use included cigarettes. She has a 1.50 pack-year smoking history. She has never used smokeless tobacco.    reports current alcohol use. reports current drug use. Drug: Marijuana Tala Coad). Marital Status single  Children none  Occupation none  Family History  Family Status   Relation Name Status    Mother  Alive   Dev Ashraf Father  Alive     family history includes High Blood Pressure in her father and mother; Other in her mother. Review of Systems:  CONSTITUTIONAL:   negative for fevers, chills, fatigue and malaise    EYES:   negative for double vision, blurred vision and photophobia    HEENT:   negative for tinnitus, epistaxis and sore throat     RESPIRATORY:   negative for cough, shortness of breath, wheezing     CARDIOVASCULAR:   negative for chest pain, palpitations, syncope, edema     GASTROINTESTINAL:   negative for nausea, vomiting     GENITOURINARY:   Urinary frequency, dysuria, suprapubic pain   MUSCULOSKELETAL:   negative for neck or back pain     NEUROLOGICAL:   Negative for weakness and tingling  negative for headaches and dizziness     PSYCHIATRIC:   negative for anxiety       OBJECTIVE:   VITALS:  height is 5' (1.524 m) and weight is 190 lb (86.2 kg). Her temporal temperature is 97.3 °F (36.3 °C). Her blood pressure is 126/68 and her pulse is 84. Her respiration is 20. CONSTITUTIONAL:alert & oriented x 3, no acute distress. Calm and pleasant. SKIN:  Warm and dry, no rashes to exposed areas of skin. HEAD:  Normocephalic, atraumatic. EYES: PERRL.   EOMs intact. EARS:  Intact and equal bilaterally. Hearing grossly WNL. NOSE:  Nares patent. No rhinorrhea   MOUTH/THROAT:  Mucous membranes pink and moist, teeth appear to be intact. NECK:supple, good ROM  LUNGS: Respirations even and non-labored. Clear to auscultation bilaterally, no wheezes, rales, or rhonchi. CARDIOVASCULAR: Regular rate and rhythm, no murmurs. ABDOMEN: soft, non-tender, non-distended, bowel sounds active x 4   EXTREMITIES: 1+ edema to bilateral lower extremities. No varicosities to bilateral lower extremities. NEUROLOGIC: CN II-XII are grossly intact. Gait is smooth. Testing:   EKG: 10/27/2022  Labs pending: drawn 10/27/2022   IMPRESSIONS:   Bladder stone. PLANS:   HOLMIUM LASER, CYSTOSCOPY LITHOLAPAXY.     SHANNAN Gates CNP  Electronically signed 10/27/2022 at 3:41 PM

## 2022-10-28 ENCOUNTER — TELEPHONE (OUTPATIENT)
Dept: UROLOGY | Age: 27
End: 2022-10-28

## 2022-10-28 LAB
CULTURE: NORMAL
EKG ATRIAL RATE: 68 BPM
EKG P AXIS: 44 DEGREES
EKG P-R INTERVAL: 162 MS
EKG Q-T INTERVAL: 432 MS
EKG QRS DURATION: 90 MS
EKG QTC CALCULATION (BAZETT): 459 MS
EKG R AXIS: 61 DEGREES
EKG T AXIS: 45 DEGREES
EKG VENTRICULAR RATE: 68 BPM
SPECIMEN DESCRIPTION: NORMAL

## 2022-10-28 NOTE — TELEPHONE ENCOUNTER
Writer called pt to give urine results. Negative culture. Patient stated Mary Anne Gordon call me in pain med up until my surgery. I don't want to keep going through this pain. \"    Writer let patient know that Marques Perez is back in office on Monday and I will speak with him then.     Verbalized understanding

## 2022-10-31 ENCOUNTER — HOSPITAL ENCOUNTER (OUTPATIENT)
Age: 27
Discharge: HOME | End: 2022-10-31
Payer: COMMERCIAL

## 2022-10-31 ENCOUNTER — OFFICE VISIT (OUTPATIENT)
Dept: NEUROLOGY | Age: 27
End: 2022-10-31
Payer: COMMERCIAL

## 2022-10-31 VITALS
BODY MASS INDEX: 36.87 KG/M2 | WEIGHT: 187.8 LBS | HEIGHT: 60 IN | DIASTOLIC BLOOD PRESSURE: 91 MMHG | HEART RATE: 94 BPM | SYSTOLIC BLOOD PRESSURE: 124 MMHG

## 2022-10-31 DIAGNOSIS — G40.909 SEIZURE DISORDER (HCC): Primary | ICD-10-CM

## 2022-10-31 DIAGNOSIS — G40.909 SEIZURE DISORDER (HCC): ICD-10-CM

## 2022-10-31 DIAGNOSIS — N21.0 CALCULUS OF URINARY BLADDER: Primary | ICD-10-CM

## 2022-10-31 PROCEDURE — 36415 COLL VENOUS BLD VENIPUNCTURE: CPT

## 2022-10-31 PROCEDURE — 80183 DRUG SCRN QUANT OXCARBAZEPIN: CPT

## 2022-10-31 PROCEDURE — 99214 OFFICE O/P EST MOD 30 MIN: CPT | Performed by: STUDENT IN AN ORGANIZED HEALTH CARE EDUCATION/TRAINING PROGRAM

## 2022-10-31 RX ORDER — TRAMADOL HYDROCHLORIDE 50 MG/1
50 TABLET ORAL EVERY 6 HOURS PRN
Qty: 12 TABLET | Refills: 0 | Status: SHIPPED | OUTPATIENT
Start: 2022-10-31 | End: 2022-11-03

## 2022-10-31 RX ORDER — HYDROCHLOROTHIAZIDE 50 MG/1
TABLET ORAL
COMMUNITY
Start: 2022-10-28

## 2022-10-31 ASSESSMENT — ENCOUNTER SYMPTOMS
RESPIRATORY NEGATIVE: 1
EYES NEGATIVE: 1
GASTROINTESTINAL NEGATIVE: 1

## 2022-10-31 NOTE — PROGRESS NOTES
[address] [address]  Dept: [phone]    PATIENT NAME: Cordell Goodwin  PATIENT MRN: [id number]  PRIMARY CARE PHYSICIAN: Nan Martines MD    HPI:      Cordell Goodwin is a 32 y.o. female who presents to clinic for follow-up  of Seizures     Interim History:  Patient notes last seizure was one month ago. She notes during that episode she stopped breathing and had entire body convulsion. Denies tongue biting and loss of urinary incontinence. Episode lasted 2-3 minutes. She was confused afterwards. She has been taking trileptal 600 mg BID and Onfi 10 mg daily. Patient notes she has been compliant and denies any side effects. Most recent NA level was 138. Patient notes she has been more stressed lately and notes she has been having mini seizures. She notes during these episodes she has palpitations and rapid eye movements. Prior History: At the last clinic visit, patient had two seizures that were witnessed in clinic. Seizures appeared to be nonepileptic in nature. Patient has stopped Onfi 2 weeks prior to the episode because she had run out. She was seen in the emergency department and given Ativan and Ela Clines was resumed. Since emergency department visit, patient notes that she has had no further seizures. Unable to get records from [address] [place] [address]. We will try to request records again. She has not had any further seizures since last visit. Denies any side effects of the AEDs. She is still experiencing a lot of anxiety. She also feels depressed. No SI/HI. Current AEDs:  Trileptal 600 mg BID  Onfi 10 mg daily    Labs reviewed:  1/18/22  Oxcarbazepine level 14      Initial history:  She has a history of seizures, pseudoseizures, bipolar disorder. She was following with Dr. Anthony Haley for her seizures and was last seen in his clinic in 3/2021. She was dismissed from the clinic in June 2021 for multiple missed appointments.   She is currently on trileptal and clobazam. Plan at his last clinic visit was to wean Onfi. Patient started having more seizures so patient was put back on Onfi to 10 mg daily. She ran out of Onfi 2 weeks ago and has been only taking half of her trileptal dose. Upon walking into the room, patient notes she feels like she is having a seizure. Patient is sitting in the chair hyperventilating. She is able to answer my questions and follow commands throughout episode. She notes she is very anxious and her anxiety is poorly controlled. She has having personal issues at home which has made her anxiety worse. Patient had two total episodes in clinic. Patient is very tearful and scared. Discussed these are likely due to her anxiety but will talk to her previous neurologist. Will send patient to ED for further evaluation. Typical episode: whole face tingling, clenches jaw, and rolling of eyes back and forth following by generalized shaking. She notes after the episodes she typically screams. Typical episode can be few seconds to 30 min. She notes she feels tired after the episode.      She had a video EEG in 10/2020 which was normal.         PREVIOUS WORKUP:     Lab Results   Component Value Date    WBC 9.8 07/20/2022    HGB 12.6 10/27/2022    HCT 39.9 10/27/2022    MCV 86.7 07/20/2022     (H) 07/20/2022       Past Medical History:   Diagnosis Date    ADD (attention deficit disorder)     Anxiety     Bipolar disorder ([facility] [address] 10/27/2022    Claustrophobia     severe, per patient    Depression     Difficult intravenous access 10/27/2022    Fear of 10/27/2022    ANESTHESIA MASK - WILL HAVE PANIC ATTACK    H/O nephrolithotomy with removal of calculi 10/18/2019    at [facility] via Dr. Mauro Pike History of suicidal ideation 07/20/2022    hospitalization july 2022-information from care everywhere    Hydrocephalus [facility]) with V-P shunt    Hydronephrosis     right sided r/t kidney stone    Intermittent self-catheterization of bladder 07/20/2022    through mitroffanoff/#12 fr catheters tid/qid    Internal tibial torsion     Kidney stone     MDRO (multiple drug resistant organisms) resistance 03/2013    E. Coli urine    Mitrofanoff appendicovesicostomy present ([address]     Myelomeningocele ([address] L5, S1 level    Neurogenic bladder     PTSD (post-traumatic stress disorder)     Pyelonephritis 03/2004    Seizure disorder ([facility] [address] 10/27/2022    last seizure approx 9/2022    Sleep apnea 10/27/2022    Does not use CPAP due to claustraphobia    Spina bifida ([address]     Under care of team 10/27/2022    NEUROLOGY - DR. GERARD - next visit 10/31/22 for surgery clearance    Under care of team 10/27/2022    [address] every Wed.  Under care of team 10/27/2022    PSYCHIATRY - [place], [place] - sees monthly    Under care of team 10/27/2022    UROLOGY - DR. STOCK    Wellness examination 10/27/2022    PCP -   [address] [place] [address] - next visit 10/28/22        Past Surgical History:   Procedure Laterality Date    BACK SURGERY  06/10/2014    release of tethered cord    BLADDER SURGERY  7/99 S/P urethral lengehtening and reimplantation with  bladder augmentation; revised 1/00    and mitrofanoff     CYSTO/URETERO/PYELOSCOPY, CALCULUS TX Right 10/02/2019    cystoscope, attempted right ureteroscopy performed by Alden Honeycutt MD at [facility] 15  11/04/2014    CYSTOSCOPY Right 09/23/2019    CYSTOSCOPY URETERAL STENT INSERTION performed by Brittany Gil MD at [facility] 23  37/53, stapled growth plate bilateral knees    HYSTERECTOMY, VAGINAL  02/10/2014    LEFT SALPINGECTOMY    NEPHROLITHOTOMY Right 10/18/2019    HOLMIUM LASER STANDBY, ANTEGRADE NEPHROSTOMY, ANTEGRADE URETEROSCOPY AND PYELOSCOPY, C-ARM  (PT NOT COMING FROM  INTERVENTIONAL) performed by Alden Honeycutt MD at [address] [place] [address] NEPHROSTOMY Right 10/02/2019    percutanious    OSTEOTOMY  4/00,  bilateral distal tibial/fibula fib derotational osteotomy    OSTEOTOMY Left     OTHER SURGICAL HISTORY   [place] tendon transfer    VAGINAL PROLAPSE REPAIR  2016    VENTRICULOPERITONEAL SHUNT  93; revised 3/16/96; ; ; programmable shunt     DR DOLL        Social History     Socioeconomic History    Marital status: Single     Spouse name: Not on file    Number of children: Not on file    Years of education: Not on file    Highest education level: Not on file   Occupational History    Not on file   Tobacco Use    Smoking status: Former     Packs/day: 0.25     Years: 6.00     Pack years: 1.50     Types: Cigarettes     Quit date:      Years since quittin.8     Passive exposure: Current (Fiancee vapes)    Smokeless tobacco: Never   Vaping Use    Vaping Use: Every day    Start date: 2021    Substances: Nicotine, THC    Devices: Pre-filled or refillable cartridge   Substance and Sexual Activity    Alcohol use: Yes     Comment: RARE - \"once or twice every couple months. \"    Drug use: Yes     Types: Marijuana [place])     Comment: Vapes each evening; gummies\"maybe once a month    Sexual activity: Yes     Partners: Male   Other Topics Concern    Not on file   Social History Narrative    Not on file     Social Determinants of Health     Financial Resource Strain: Not on file   Food Insecurity: Not on file   Transportation Needs: Not on file   Physical Activity: Not on file   Stress: Not on file   Social Connections: Not on file   Intimate Partner Violence: Not on file   Housing Stability: Not on file        Current Outpatient Medications   Medication Sig Dispense Refill    traMADol (ULTRAM) 50 MG tablet Take 1 tablet by mouth every 6 hours as needed for Pain for up to 3 days. Intended supply: 3 days.  Take lowest dose possible to manage pain 12 tablet 0    hydroCHLOROthiazide (HYDRODIURIL) 50 MG tablet take 1 tablet by mouth once daily      citalopram (CELEXA) 10 MG tablet take 1 tablet by mouth once daily      ziprasidone (GEODON) 20 MG capsule Take 40 mg by mouth nightly      diphenhydrAMINE (BENADRYL) 25 MG tablet Take 25 mg by mouth every 6 hours as needed for Itching      cloBAZam (ONFI) 10 MG TABS tablet take 1 tablet by mouth once daily 30 tablet 5    OXcarbazepine (TRILEPTAL) 300 MG tablet Take 2 tablets by mouth 2 times daily 360 tablet 4    estradiol (VIVELLE) 0.1 MG/24HR Place 1 patch onto the skin Twice a Week       prazosin (MINIPRESS) 1 MG capsule Take 1 mg by mouth nightly      famotidine (PEPCID) 20 MG tablet take 1 tablet by mouth twice a day  0    ondansetron (ZOFRAN-ODT) 4 MG disintegrating tablet Take 4 mg by mouth every 8 hours as needed for Nausea or Vomiting      ibuprofen (ADVIL;MOTRIN) 800 MG tablet Take 1 tablet by mouth every 8 hours as needed (pain). 40 tablet 0    cetirizine (ZYRTEC) 10 MG tablet Take 10 mg by mouth daily as needed       zolpidem (AMBIEN) 5 MG tablet Take 5 mg by mouth nightly. (Patient not taking: Reported on 10/31/2022)      brexpiprazole (REXULTI) 1 MG TABS tablet Take 1 mg by mouth daily (Patient not taking: Reported on 10/31/2022)      busPIRone (BUSPAR) 15 MG tablet Take 15 mg by mouth 2 times daily  (Patient not taking: Reported on 10/31/2022)      escitalopram (LEXAPRO) 20 MG tablet Take 1 tablet by mouth daily (Patient not taking: No sig reported)      QUEtiapine (SEROQUEL) 25 MG tablet Take 200 mg by mouth nightly  (Patient not taking: No sig reported)       No current facility-administered medications for this visit. Allergies   Allergen Reactions    Latex Rash    Furadantin [Nitrofurantoin] Other (See Comments)     Night tremors and hallucinations    Banana Rash    Kiwi Extract Rash    Phenobarbital Rash    Sulfa Antibiotics Rash        REVIEW OF SYSTEMS:     Review of Systems   Constitutional: Negative. HENT: Negative. Eyes: Negative. Respiratory: Negative.      Cardiovascular: Negative. Gastrointestinal: Negative. Endocrine: Negative. Genitourinary: Negative. Musculoskeletal: Negative. Skin: Negative. Neurological:  Negative for dizziness, tremors, seizures, syncope, facial asymmetry, speech difficulty, weakness, light-headedness, numbness and headaches. Hematological: Negative. Psychiatric/Behavioral:  Negative for sleep disturbance. VITALS  Vitals:    10/31/22 1618   BP: (!) 124/91   Pulse: 94            NEUROLOGICAL EXAMINATION:         Mental status    Alert and oriented x 3; intact memory with no confusion, speech or language problems; no hallucinations or delusions  Fund of information appropriate for level of education    Cranial nerves    II - visual fields intact to confrontation  III, IV, VI - extra-ocular muscles full: no pupillary defect; no HEAM, no nystagmus, no ptosis   V - normal facial sensation                                                               VII - normal facial symmetry                                                             VIII - intact hearing                                                                             IX, X - symmetrical palate                                                                  XI - symmetrical shoulder shrug                                                       XII - tongue midline without atrophy or fasciculation      Motor function  Normal muscle bulk and tone; strength 5/5 on all 4 extremities, no pronator drift      Sensory function Intact to light touch, pinprick in all 4 extremities      Cerebellar Intact fine motor movement. Reflex function DTR 2+ on bilateral UE and LE, symmetric. ASSESSMENT / PLAN:   This is a 64-year-old female who presents to follow up for  seizures. Has epileptic and nonepileptic episodes. She is taking Trileptal 600 mg twice a day as well as Onfi 10 mg daily.   She had an EEG in 2020 which was normal.  Have tried to request records from [facility] but have not been successful. Discussed that the her most recent episodes are most likely nonepileptic spells but I would like her to be admitted for video EEG monitoring and have her medications stopped during that hospitalization to assess her different spells. Continue current regimen for now.        Brenda Irving MD   Neurology & Sleep Medicine  [facility]

## 2022-11-03 LAB — OXCARBAZEPINE: 26 UG/ML (ref 3–35)

## 2022-11-04 NOTE — TELEPHONE ENCOUNTER
Shaun Flower did call in tramadol for the patient. She was notified. Writer called patient to see how she is doing. Patient stated \"I am still in a lot of pain. I'm nervous about surgery, but I can't wait to get this over with, so that I'm not in anymore pain. I am feeling a little nauseous. \"

## 2022-11-07 DIAGNOSIS — N21.0 CALCULUS OF URINARY BLADDER: Primary | ICD-10-CM

## 2022-11-07 DIAGNOSIS — R11.0 NAUSEA: ICD-10-CM

## 2022-11-07 DIAGNOSIS — N20.0 KIDNEY STONE: ICD-10-CM

## 2022-11-07 RX ORDER — ONDANSETRON 4 MG/1
4 TABLET, FILM COATED ORAL EVERY 8 HOURS PRN
Qty: 10 TABLET | Refills: 0 | Status: SHIPPED | OUTPATIENT
Start: 2022-11-07

## 2022-11-10 ENCOUNTER — TELEPHONE (OUTPATIENT)
Dept: UROLOGY | Age: 27
End: 2022-11-10

## 2022-11-11 ENCOUNTER — HOSPITAL ENCOUNTER (OUTPATIENT)
Age: 27
Setting detail: OUTPATIENT SURGERY
Discharge: HOME | End: 2022-11-11
Attending: UROLOGY | Admitting: UROLOGY
Payer: COMMERCIAL

## 2022-11-11 ENCOUNTER — ANESTHESIA EVENT (OUTPATIENT)
Dept: OPERATING ROOM | Age: 27
End: 2022-11-11
Payer: COMMERCIAL

## 2022-11-11 ENCOUNTER — ANESTHESIA (OUTPATIENT)
Dept: OPERATING ROOM | Age: 27
End: 2022-11-11
Payer: COMMERCIAL

## 2022-11-11 VITALS
SYSTOLIC BLOOD PRESSURE: 124 MMHG | RESPIRATION RATE: 14 BRPM | DIASTOLIC BLOOD PRESSURE: 95 MMHG | TEMPERATURE: 97 F | OXYGEN SATURATION: 94 % | HEART RATE: 80 BPM

## 2022-11-11 DIAGNOSIS — N21.0 BLADDER STONE: ICD-10-CM

## 2022-11-11 PROCEDURE — 7100000000 HC PACU RECOVERY - FIRST 15 MIN: Performed by: UROLOGY

## 2022-11-11 PROCEDURE — 6360000002 HC RX W HCPCS: Performed by: PHYSICIAN ASSISTANT

## 2022-11-11 PROCEDURE — 7100000010 HC PHASE II RECOVERY - FIRST 15 MIN: Performed by: UROLOGY

## 2022-11-11 PROCEDURE — 3600000004 HC SURGERY LEVEL 4 BASE: Performed by: UROLOGY

## 2022-11-11 PROCEDURE — 2580000003 HC RX 258: Performed by: UROLOGY

## 2022-11-11 PROCEDURE — 7100000011 HC PHASE II RECOVERY - ADDTL 15 MIN: Performed by: UROLOGY

## 2022-11-11 PROCEDURE — 87086 URINE CULTURE/COLONY COUNT: CPT

## 2022-11-11 PROCEDURE — 2580000003 HC RX 258: Performed by: ANESTHESIOLOGY

## 2022-11-11 PROCEDURE — 82365 CALCULUS SPECTROSCOPY: CPT

## 2022-11-11 PROCEDURE — 3700000001 HC ADD 15 MINUTES (ANESTHESIA): Performed by: UROLOGY

## 2022-11-11 PROCEDURE — 6360000002 HC RX W HCPCS: Performed by: NURSE ANESTHETIST, CERTIFIED REGISTERED

## 2022-11-11 PROCEDURE — 2720000010 HC SURG SUPPLY STERILE: Performed by: UROLOGY

## 2022-11-11 PROCEDURE — 3700000000 HC ANESTHESIA ATTENDED CARE: Performed by: UROLOGY

## 2022-11-11 PROCEDURE — 3600000014 HC SURGERY LEVEL 4 ADDTL 15MIN: Performed by: UROLOGY

## 2022-11-11 PROCEDURE — 7100000001 HC PACU RECOVERY - ADDTL 15 MIN: Performed by: UROLOGY

## 2022-11-11 PROCEDURE — 2709999900 HC NON-CHARGEABLE SUPPLY: Performed by: UROLOGY

## 2022-11-11 PROCEDURE — 6360000002 HC RX W HCPCS

## 2022-11-11 PROCEDURE — 6370000000 HC RX 637 (ALT 250 FOR IP): Performed by: ANESTHESIOLOGY

## 2022-11-11 PROCEDURE — 2500000003 HC RX 250 WO HCPCS: Performed by: NURSE ANESTHETIST, CERTIFIED REGISTERED

## 2022-11-11 PROCEDURE — 6360000002 HC RX W HCPCS: Performed by: ANESTHESIOLOGY

## 2022-11-11 RX ORDER — LIDOCAINE HYDROCHLORIDE 10 MG/ML
INJECTION, SOLUTION INFILTRATION; PERINEURAL PRN
Status: DISCONTINUED | OUTPATIENT
Start: 2022-11-11 | End: 2022-11-11 | Stop reason: SDUPTHER

## 2022-11-11 RX ORDER — KETOROLAC TROMETHAMINE 30 MG/ML
INJECTION, SOLUTION INTRAMUSCULAR; INTRAVENOUS PRN
Status: DISCONTINUED | OUTPATIENT
Start: 2022-11-11 | End: 2022-11-11 | Stop reason: SDUPTHER

## 2022-11-11 RX ORDER — SODIUM CHLORIDE 0.9 % (FLUSH) 0.9 %
5-40 SYRINGE (ML) INJECTION EVERY 12 HOURS SCHEDULED
Status: DISCONTINUED | OUTPATIENT
Start: 2022-11-11 | End: 2022-11-11 | Stop reason: HOSPADM

## 2022-11-11 RX ORDER — CEFADROXIL 500 MG/1
500 CAPSULE ORAL 2 TIMES DAILY
Qty: 14 CAPSULE | Refills: 0 | Status: SHIPPED | OUTPATIENT
Start: 2022-11-11 | End: 2022-11-18

## 2022-11-11 RX ORDER — MEPERIDINE HYDROCHLORIDE 50 MG/ML
12.5 INJECTION INTRAMUSCULAR; INTRAVENOUS; SUBCUTANEOUS EVERY 5 MIN PRN
Status: DISCONTINUED | OUTPATIENT
Start: 2022-11-11 | End: 2022-11-11 | Stop reason: HOSPADM

## 2022-11-11 RX ORDER — MIDAZOLAM HYDROCHLORIDE 2 MG/2ML
2 INJECTION, SOLUTION INTRAMUSCULAR; INTRAVENOUS ONCE
Status: COMPLETED | OUTPATIENT
Start: 2022-11-11 | End: 2022-11-11

## 2022-11-11 RX ORDER — DEXAMETHASONE SODIUM PHOSPHATE 10 MG/ML
INJECTION INTRAMUSCULAR; INTRAVENOUS PRN
Status: DISCONTINUED | OUTPATIENT
Start: 2022-11-11 | End: 2022-11-11 | Stop reason: SDUPTHER

## 2022-11-11 RX ORDER — DIPHENHYDRAMINE HYDROCHLORIDE 50 MG/ML
12.5 INJECTION INTRAMUSCULAR; INTRAVENOUS
Status: DISCONTINUED | OUTPATIENT
Start: 2022-11-11 | End: 2022-11-11 | Stop reason: HOSPADM

## 2022-11-11 RX ORDER — PROPOFOL 10 MG/ML
INJECTION, EMULSION INTRAVENOUS PRN
Status: DISCONTINUED | OUTPATIENT
Start: 2022-11-11 | End: 2022-11-11 | Stop reason: SDUPTHER

## 2022-11-11 RX ORDER — FENTANYL CITRATE 50 UG/ML
INJECTION, SOLUTION INTRAMUSCULAR; INTRAVENOUS
Status: COMPLETED
Start: 2022-11-11 | End: 2022-11-11

## 2022-11-11 RX ORDER — ONDANSETRON 2 MG/ML
INJECTION INTRAMUSCULAR; INTRAVENOUS PRN
Status: DISCONTINUED | OUTPATIENT
Start: 2022-11-11 | End: 2022-11-11 | Stop reason: SDUPTHER

## 2022-11-11 RX ORDER — MAGNESIUM HYDROXIDE 1200 MG/15ML
LIQUID ORAL PRN
Status: DISCONTINUED | OUTPATIENT
Start: 2022-11-11 | End: 2022-11-11 | Stop reason: ALTCHOICE

## 2022-11-11 RX ORDER — SODIUM CHLORIDE 9 MG/ML
25 INJECTION, SOLUTION INTRAVENOUS PRN
Status: DISCONTINUED | OUTPATIENT
Start: 2022-11-11 | End: 2022-11-11 | Stop reason: HOSPADM

## 2022-11-11 RX ORDER — FENTANYL CITRATE 50 UG/ML
100 INJECTION, SOLUTION INTRAMUSCULAR; INTRAVENOUS ONCE
Status: COMPLETED | OUTPATIENT
Start: 2022-11-11 | End: 2022-11-11

## 2022-11-11 RX ORDER — DROPERIDOL 2.5 MG/ML
0.62 INJECTION, SOLUTION INTRAMUSCULAR; INTRAVENOUS
Status: DISCONTINUED | OUTPATIENT
Start: 2022-11-11 | End: 2022-11-11 | Stop reason: HOSPADM

## 2022-11-11 RX ORDER — MIDAZOLAM HYDROCHLORIDE 1 MG/ML
INJECTION INTRAMUSCULAR; INTRAVENOUS
Status: COMPLETED
Start: 2022-11-11 | End: 2022-11-11

## 2022-11-11 RX ORDER — SODIUM CHLORIDE, SODIUM LACTATE, POTASSIUM CHLORIDE, CALCIUM CHLORIDE 600; 310; 30; 20 MG/100ML; MG/100ML; MG/100ML; MG/100ML
1000 INJECTION, SOLUTION INTRAVENOUS CONTINUOUS
Status: DISCONTINUED | OUTPATIENT
Start: 2022-11-11 | End: 2022-11-11 | Stop reason: HOSPADM

## 2022-11-11 RX ORDER — METOCLOPRAMIDE HYDROCHLORIDE 5 MG/ML
10 INJECTION INTRAMUSCULAR; INTRAVENOUS
Status: DISCONTINUED | OUTPATIENT
Start: 2022-11-11 | End: 2022-11-11 | Stop reason: HOSPADM

## 2022-11-11 RX ORDER — CYCLOBENZAPRINE HCL 5 MG
5 TABLET ORAL 2 TIMES DAILY PRN
Qty: 20 TABLET | Refills: 0 | Status: SHIPPED | OUTPATIENT
Start: 2022-11-11 | End: 2022-11-21

## 2022-11-11 RX ORDER — MAGNESIUM HYDROXIDE 1200 MG/15ML
LIQUID ORAL CONTINUOUS PRN
Status: COMPLETED | OUTPATIENT
Start: 2022-11-11 | End: 2022-11-11

## 2022-11-11 RX ORDER — HYDROCODONE BITARTRATE AND ACETAMINOPHEN 5; 325 MG/1; MG/1
1 TABLET ORAL
Status: COMPLETED | OUTPATIENT
Start: 2022-11-11 | End: 2022-11-11

## 2022-11-11 RX ORDER — SODIUM CHLORIDE 0.9 % (FLUSH) 0.9 %
5-40 SYRINGE (ML) INJECTION PRN
Status: DISCONTINUED | OUTPATIENT
Start: 2022-11-11 | End: 2022-11-11 | Stop reason: HOSPADM

## 2022-11-11 RX ORDER — FENTANYL CITRATE 50 UG/ML
INJECTION, SOLUTION INTRAMUSCULAR; INTRAVENOUS PRN
Status: DISCONTINUED | OUTPATIENT
Start: 2022-11-11 | End: 2022-11-11 | Stop reason: SDUPTHER

## 2022-11-11 RX ORDER — MIDAZOLAM HYDROCHLORIDE 1 MG/ML
INJECTION INTRAMUSCULAR; INTRAVENOUS PRN
Status: DISCONTINUED | OUTPATIENT
Start: 2022-11-11 | End: 2022-11-11 | Stop reason: SDUPTHER

## 2022-11-11 RX ORDER — HYDRALAZINE HYDROCHLORIDE 20 MG/ML
10 INJECTION INTRAMUSCULAR; INTRAVENOUS
Status: DISCONTINUED | OUTPATIENT
Start: 2022-11-11 | End: 2022-11-11 | Stop reason: HOSPADM

## 2022-11-11 RX ADMIN — MIDAZOLAM 2 MG: 1 INJECTION INTRAMUSCULAR; INTRAVENOUS at 10:54

## 2022-11-11 RX ADMIN — MIDAZOLAM HYDROCHLORIDE 2 MG: 2 INJECTION, SOLUTION INTRAMUSCULAR; INTRAVENOUS at 10:54

## 2022-11-11 RX ADMIN — HYDROCODONE BITARTRATE AND ACETAMINOPHEN 1 TABLET: 5; 325 TABLET ORAL at 13:57

## 2022-11-11 RX ADMIN — PROPOFOL 200 MG: 10 INJECTION, EMULSION INTRAVENOUS at 11:41

## 2022-11-11 RX ADMIN — SODIUM CHLORIDE, POTASSIUM CHLORIDE, SODIUM LACTATE AND CALCIUM CHLORIDE: 600; 310; 30; 20 INJECTION, SOLUTION INTRAVENOUS at 12:58

## 2022-11-11 RX ADMIN — FENTANYL CITRATE 100 MCG: 50 INJECTION INTRAMUSCULAR; INTRAVENOUS at 10:53

## 2022-11-11 RX ADMIN — FENTANYL CITRATE 50 MCG: 50 INJECTION, SOLUTION INTRAMUSCULAR; INTRAVENOUS at 11:54

## 2022-11-11 RX ADMIN — MIDAZOLAM 2 MG: 1 INJECTION INTRAMUSCULAR; INTRAVENOUS at 11:38

## 2022-11-11 RX ADMIN — DEXAMETHASONE SODIUM PHOSPHATE 10 MG: 10 INJECTION INTRAMUSCULAR; INTRAVENOUS at 11:49

## 2022-11-11 RX ADMIN — SODIUM CHLORIDE, POTASSIUM CHLORIDE, SODIUM LACTATE AND CALCIUM CHLORIDE 1000 ML: 600; 310; 30; 20 INJECTION, SOLUTION INTRAVENOUS at 10:47

## 2022-11-11 RX ADMIN — Medication 2000 MG: at 11:57

## 2022-11-11 RX ADMIN — HYDROMORPHONE HYDROCHLORIDE 0.5 MG: 1 INJECTION, SOLUTION INTRAMUSCULAR; INTRAVENOUS; SUBCUTANEOUS at 13:12

## 2022-11-11 RX ADMIN — LIDOCAINE HYDROCHLORIDE 50 MG: 10 INJECTION, SOLUTION INFILTRATION; PERINEURAL at 11:41

## 2022-11-11 RX ADMIN — KETOROLAC TROMETHAMINE 30 MG: 30 INJECTION, SOLUTION INTRAMUSCULAR at 12:46

## 2022-11-11 RX ADMIN — FENTANYL CITRATE 100 MCG: 50 INJECTION, SOLUTION INTRAMUSCULAR; INTRAVENOUS at 10:53

## 2022-11-11 RX ADMIN — ONDANSETRON 4 MG: 2 INJECTION INTRAMUSCULAR; INTRAVENOUS at 11:54

## 2022-11-11 ASSESSMENT — PAIN SCALES - GENERAL
PAINLEVEL_OUTOF10: 6
PAINLEVEL_OUTOF10: 8
PAINLEVEL_OUTOF10: 7
PAINLEVEL_OUTOF10: 6
PAINLEVEL_OUTOF10: 10
PAINLEVEL_OUTOF10: 8
PAINLEVEL_OUTOF10: 6
PAINLEVEL_OUTOF10: 6
PAINLEVEL_OUTOF10: 5
PAINLEVEL_OUTOF10: 4

## 2022-11-11 ASSESSMENT — PAIN DESCRIPTION - LOCATION
LOCATION: VAGINA
LOCATION: BACK;ABDOMEN
LOCATION: VAGINA

## 2022-11-11 ASSESSMENT — PAIN DESCRIPTION - DESCRIPTORS
DESCRIPTORS: SORE
DESCRIPTORS: BURNING

## 2022-11-11 NOTE — DISCHARGE INSTRUCTIONS
Pt ok to discharge home in good condition  No heavy lifting, >10 lbs for next 4-6 weeks  Pt should avoid strenuous activity   Pt should walk moderately at home  Pt ok to shower  Pt may continue regular diet   Please call attending physician or hospital  with questions  Call or Present to ED if fever (> 101F), intractable nausea vomiting or pain. Rx in chart     Pt should follow up with Dr. Aditya Verduzco in office, in 1-2 weeks, call to confirm appointment   Maintain de la cruz catheter for 1 week, f/u in clinic for removal    You may notice blood in the urine for the next few days - this is expected   Britni 4     No alcoholic beverages, no driving or operating machinery, no making important decisions for 24 hours. You may have a normal diet but should eat lightly day of surgery. Drink plenty of fluids. Urinate within 8 hours after surgery, if unable to urinate call your doctor  No alcoholic beverages, no driving or operating machinery, no making important decisions for 24 hours. Children should maintain quiet play ( games, movies, books ) for 24 hours. You may have a normal diet but should eat lightly day of surgery. Drink plenty of fluids.   Urinate within 8 hours after surgery, if unable to urinate call your doctor

## 2022-11-11 NOTE — OP NOTE
LOCATION:  [address] [place] [address]    DATE:  11/11/22    SURGEON:   Alden Honeycutt MD    ASSISTANT:  Yady Galdamez DO, PGY-3    PREOPERATIVE DIAGNOSIS:  BLADDER STONE    POSTOPERATIVE DIAGNOSIS:  same    PROCEDURE PERFORMED:  Cystolithalopaxy    ANESTHESIA:  General    COMPLICATIONS:  None. ESTIMATED BLOOD LOSS:  less than 50ml    DRAINS:  16Fr de la cruz catheter    SPECIMENS:  Stone for analysis and Urine for culture    INDICATIONS FOR PROCEDURE:  The patient is a 32 y.o. female with a history of bladder stone presenting today for stone removal. The risks and benefits of the procedure as well as possible alternatives and complications were discussed and she consented    DETAILS OF THE PROCEDURE:  The patient was correctly identified in the preoperative holding area. she was brought back to the operating room and placed in the dorsal lithotomy. EPC cuffs were in place, turned on, and fully functional. General endotracheal anesthesia was administered. she was given Ancef 2gm IV  for antibiotic prophylaxis. The patient was then prepped and draped in the usual sterile fashion. After appropriate time-out was performed with all parties agreeing, a 22Fr cystoscope with a 30 degree lens was inserted into the bladder. There was 1 large stone located in the bladder, approximately 5cm. A 1000micron holmium laser fiber was inserted and used to fragment the stone. The fragments were removed with a combination of basketing and ellik. The bladder was found to be intact and free of stones. The bladder was drained and the scope was removed. A 16Fr de la cruz catheter was inserted. The patient tolerated the procedure well and was sent to PACU for postoperative monitoring. The following specimes were taken: Stone for analysis and Urine for culture. The patient tolerated the procedure well and was sent to pacu for post-operative monitoring.      Dr. Vignesh Coppola was present and scrubbed for the entire duration of the procedure    DISPOSITION:  The patient wasdischarged home in stable condition with instructions to follow up in   1 week for Thakur removal

## 2022-11-11 NOTE — PROGRESS NOTES
Discharge instructions and prescriptions were reviewed with patient and dad. Both acknowledged understanding. Scripts e-scripted to patient's pharmacy.

## 2022-11-11 NOTE — INTERVAL H&P NOTE
Update History & Physical    The patient's History and Physical of October 27, 2022 was reviewed with the patient and I examined the patient. There was no change. The surgical site was confirmed by the patient and me. Plan: Cysto, litholapaxy, holmium laser  The risks, benefits, expected outcome, and alternative to the recommended procedure have been discussed with the patient. Patient understands and wants to proceed with the procedure.      Electronically signed by Abram Sosa PA-C on 11/11/2022 at 10:24 AM

## 2022-11-11 NOTE — ANESTHESIA PRE PROCEDURE
Department of Anesthesiology  Preprocedure Note       Name:  Brandon Pascal   Age:  32 y.o.  :  1995                                          MRN:  [id number]         Date:  2022      Surgeon: Johnie Solorio):  Jens Diaz MD    Procedure: Procedure(s):  HOLMIUM LASER, CYSTOSCOPY LITHOLAPAXY    Medications prior to admission:   Prior to Admission medications    Medication Sig Start Date End Date Taking? Authorizing Provider   ondansetron (ZOFRAN) 4 MG tablet Take 1 tablet by mouth every 8 hours as needed for Nausea or Vomiting 22   SHANNAN Whaley CNP   hydroCHLOROthiazide (HYDRODIURIL) 50 MG tablet take 1 tablet by mouth once daily 10/28/22   Historical Provider, MD   citalopram (CELEXA) 10 MG tablet take 1 tablet by mouth once daily 10/14/22   Historical Provider, MD   zolpidem (AMBIEN) 5 MG tablet Take 5 mg by mouth nightly.   Patient not taking: Reported on 10/31/2022    Historical Provider, MD   ziprasidone (GEODON) 20 MG capsule Take 40 mg by mouth nightly    Historical Provider, MD   diphenhydrAMINE (BENADRYL) 25 MG tablet Take 25 mg by mouth every 6 hours as needed for Itching    Historical Provider, MD   cloBAZam (ONFI) 10 MG TABS tablet take 1 tablet by mouth once daily 22  Dai Lloyd MD   OXcarbazepine (TRILEPTAL) 300 MG tablet Take 2 tablets by mouth 2 times daily 22  Dai Lloyd MD   estradiol (VIVELLE) 0.1 MG/24HR Place 1 patch onto the skin Twice a Week  22   Historical Provider, MD   brexpiprazole (REXULTI) 1 MG TABS tablet Take 1 mg by mouth daily  Patient not taking: Reported on 10/31/2022    Historical Provider, MD   busPIRone (BUSPAR) 15 MG tablet Take 15 mg by mouth 2 times daily   Patient not taking: Reported on 10/31/2022    Historical Provider, MD   prazosin (MINIPRESS) 1 MG capsule Take 1 mg by mouth nightly 18   Historical Provider, MD   escitalopram (LEXAPRO) 20 MG tablet Take 1 tablet by mouth daily  Patient not taking: No sig reported 1/1/19   Historical Provider, MD   QUEtiapine (SEROQUEL) 25 MG tablet Take 200 mg by mouth nightly   Patient not taking: No sig reported 12/29/18   Historical Provider, MD   famotidine (PEPCID) 20 MG tablet take 1 tablet by mouth twice a day 1/13/17   Historical Provider, MD   ondansetron (ZOFRAN-ODT) 4 MG disintegrating tablet Take 4 mg by mouth every 8 hours as needed for Nausea or Vomiting    Historical Provider, MD   ibuprofen (ADVIL;MOTRIN) 800 MG tablet Take 1 tablet by mouth every 8 hours as needed (pain). 2/11/14   Zollie Comfort, DO   cetirizine (ZYRTEC) 10 MG tablet Take 10 mg by mouth daily as needed     Historical Provider, MD       Current medications:    Current Facility-Administered Medications   Medication Dose Route Frequency Provider Last Rate Last Admin    ceFAZolin (ANCEF) 2000 mg in sterile water 20 mL IV syringe  2,000 mg IntraVENous On Call to [address] [facility],[address], [facility]           Allergies:     Allergies   Allergen Reactions    Latex Rash    Furadantin [Nitrofurantoin] Other (See Comments)     Night tremors and hallucinations    Banana Rash    Kiwi Extract Rash    Phenobarbital Rash    Sulfa Antibiotics Rash       Problem List:    Patient Active Problem List   Diagnosis Code    Myelomeningocele ([facility] Q05.9    Hydrocephalus ([facility] G91.9    Internal tibial torsion M21.869    Seizure disorder ([facility] G40.909    Pyelonephritis N12    ADD (attention deficit disorder) F98.8    Sleep apnea G47.30    Neurogenic bladder N31.9    Uterine prolapse N81.4    Dysmenorrhea N94.6    Pelvic pain in female R10.2    Ovarian cyst N83.209    Back pain, lumbosacral M54.50    Neurogenic bowel K59.2    Tethered cord (HCC) Q06.8    Stomal stenosis [id number]    Rash R21    Ureteral stone N20.1    Right renal stone N20.0       Past Medical History:        Diagnosis Date    ADD (attention deficit disorder)     Anxiety     Bipolar disorder ([facility] 10/27/2022    Claustrophobia     severe, per patient    Depression     Difficult intravenous access 10/27/2022    Fear of 10/27/2022    ANESTHESIA MASK - WILL HAVE PANIC ATTACK    H/O nephrolithotomy with removal of calculi 10/18/2019    at [facility] via Dr. Kwadwo Matta History of suicidal ideation 07/20/2022    hospitalization july 2022-information from care everywhere    Hydrocephalus [facility]) with V-P shunt    Hydronephrosis     right sided r/t kidney stone    Intermittent self-catheterization of bladder 07/20/2022    through mitroffanoff/#12 fr catheters tid/qid    Internal tibial torsion     Kidney stone     MDRO (multiple drug resistant organisms) resistance 03/2013    E. Coli urine    Mitrofanoff appendicovesicostomy present ([address]     Myelomeningocele ([address] L5, S1 level    Neurogenic bladder     PTSD (post-traumatic stress disorder)     Pyelonephritis 03/2004    Seizure disorder ([address] 10/27/2022    last seizure approx 9/2022    Sleep apnea 10/27/2022    Does not use CPAP due to claustraphobia    Spina bifida ([address]     Under care of team 10/27/2022    NEUROLOGY - DR. GERARD - next visit 10/31/22 for surgery clearance    Under care of team 10/27/2022    [address] every Wed.  Under care of team 10/27/2022    PS[facility]CHIATRY - [facility], [facility] - sees monthly    Under care of team 10/27/2022    UROLOGY - DR. STOCK    Wellness examination 10/27/2022    PCP -   [address] [place] [address] - next visit 10/28/22       Past Surgical History:        Procedure Laterality Date    BACK SURGERY  06/10/2014    release of tethered cord    BLADDER SURGERY  7/99 S/P urethral lengehtening and reimplantation with  bladder augmentation; revised 1/00    and mitrofanoff     CYSTO/URETERO/PYELOSCOPY, CALCULUS TX Right 10/02/2019    cystoscope, attempted right ureteroscopy [facility]  11/04/2014    CYSTOSCOPY Right 09/23/2019    CYSTOSCOPY URETERAL STENT INSERTION performed by Shahana Huertas MD at [address]  94/53, stapled growth plate bilateral knees    HYSTERECTOMY, VAGINAL  02/10/2014    LEFT SALPINGECTOMY    NEPHROLITHOTOMY Right 10/18/2019    HOLMIUM LASER STANDBY, ANTEGRADE NEPHROSTOMY, ANTEGRADE URETEROSCOPY AND PYELOSCOPY, C-ARM  (PT NOT COMING FROM  INTERVENTIONAL) performed by Lit Nathan MD at [address] [place] [address] Drive NEPHROSTOMY Right 10/02/2019    percutanious    OSTEOTOMY  ,  bilateral distal tibial/fibula fib derotational osteotomy    OSTEOTOMY Left     OTHER SURGICAL HISTORY   [place] tendon transfer    VAGINAL PROLAPSE REPAIR  2016    VENTRICULOPERITONEAL SHUNT  93; revised 3/16/96; ; ; programmable shunt     DR DOLL       Social History:    Social History     Tobacco Use    Smoking status: Former     Packs/day: 0.25     Years: 6.00     Pack years: 1.50     Types: Cigarettes     Quit date:      Years since quittin.8     Passive exposure: Current ([employer] vapes)    Smokeless tobacco: Never   Substance Use Topics    Alcohol use: Yes     Comment: RARE - \"once or twice every couple months. \"                                Counseling given: Not Answered      Vital Signs (Current): There were no vitals filed for this visit.                                            BP Readings from Last 3 Encounters:   10/27/22 126/68   10/31/22 (!) 124/91   22 (!) 140/104       NPO Status: Time of last liquid consumption:                         Time of last solid consumption:                         Date of last liquid consumption: 22                        Date of last solid food consumption: 22    BMI:   Wt Readings from Last 3 Encounters:   10/27/22 190 lb (86.2 kg)   10/31/22 187 lb 12.8 oz (85.2 kg)   10/17/22 181 lb (82.1 kg)     There is no height or weight on file to calculate BMI.    CBC:   Lab Results   Component Value Date/Time    WBC 9.8 2022 04:05 PM RBC 4.45 07/20/2022 04:05 PM    HGB 12.6 10/27/2022 04:00 PM    HCT 39.9 10/27/2022 04:00 PM    MCV 86.7 07/20/2022 04:05 PM    RDW 12.7 07/20/2022 04:05 PM     07/20/2022 04:05 PM       CMP:   Lab Results   Component Value Date/Time     10/27/2022 04:00 PM    K 3.8 10/27/2022 04:00 PM    CL 98 10/27/2022 04:00 PM    CO2 24 10/27/2022 04:00 PM    BUN 14 10/27/2022 04:00 PM    CREATININE 0.39 10/27/2022 04:00 PM    GFRAA >60 07/20/2022 04:05 PM    LABGLOM >60 10/27/2022 04:00 PM    GLUCOSE 94 10/27/2022 04:00 PM    PROT 7.7 07/20/2022 04:05 PM    CALCIUM 10.1 07/20/2022 04:05 PM    BILITOT 0.52 07/20/2022 04:05 PM    ALKPHOS 173 07/20/2022 04:05 PM    AST 16 07/20/2022 04:05 PM    ALT 28 07/20/2022 04:05 PM       POC Tests: No results for input(s): POCGLU, POCNA, POCK, POCCL, POCBUN, POCHEMO, POCHCT in the last 72 hours.     Coags:   Lab Results   Component Value Date/Time    PROTIME 10.0 05/30/2014 11:20 AM    INR 1.0 05/30/2014 11:20 AM    APTT 26.2 05/30/2014 11:20 AM       HCG (If Applicable):   Lab Results   Component Value Date    HCG NEGATIVE 09/23/2019    HCGQUANT <2 03/21/2013        ABGs: No results found for: PHART, PO2ART, DNU3OSZ, VWC1RZA, BEART, Y5UMBGQU     Type & Screen (If Applicable):  No results found for: LABABO, LABRH    Drug/Infectious Status (If Applicable):  No results found for: HIV, HEPCAB    COVID-19 Screening (If Applicable):   Lab Results   Component Value Date/Time    COVID19 Not Detected 07/20/2022 05:08 PM           Anesthesia Evaluation  Patient summary reviewed and Nursing notes reviewed no history of anesthetic complications:   Airway: Mallampati: II  TM distance: >3 FB   Neck ROM: full  Mouth opening: > = 3 FB   Dental: normal exam         Pulmonary:normal exam    (+) sleep apnea:                             Cardiovascular:Negative CV ROS                      Neuro/Psych:   (+) seizures (last seizure approx 2 monts ago):, psychiatric history (bipolar ):depression/anxiety              ROS comment: Neurogenic bladder   myelomeningocele GI/Hepatic/Renal:   (+) GERD:,           Endo/Other: Negative Endo/Other ROS                     ROS comment: marijuana and alcohol use Abdominal:             Vascular: Other Findings:           Anesthesia Plan      general     ASA 3     (Patient is claustrophobic  )  Induction: intravenous. MIPS: Postoperative opioids intended and Prophylactic antiemetics administered. Anesthetic plan and risks discussed with patient. Plan discussed with CRNA.                     Arsh Gonzalez MD   11/11/2022

## 2022-11-11 NOTE — ANESTHESIA POSTPROCEDURE EVALUATION
POST- ANESTHESIA EVALUATION       Pt Name: Emi Mayes  MRN: [id number]  YOB: 1995  Date of evaluation: 11/11/2022  Time:  3:10 PM      BP (!) 122/94   Pulse 72   Temp 97 °F (36.1 °C) (Temporal)   Resp 13   LMP 02/06/2012   SpO2 94%      Consciousness Level  Awake  Cardiopulmonary Status  Stable  Pain Adequately Treated YES  Nausea / Vomiting  NO  Adequate Hydration  YES  Anesthesia Related Complications NONE      Electronically signed by Jarvis Pinzon MD on 11/11/2022 at 3:10 PM       Department of Anesthesiology  Postprocedure Note    Patient: Emi Mayes  MRN: [id number]  YOB: 1995  Date of evaluation: 11/11/2022      Procedure Summary     Date: 11/11/22 Room / Location: [facility] 01 / [address]""    Anesthesia Start: 1138 Anesthesia Stop: 1303    Procedure: HOLMIUM LASER LITHOLAPAXY, CYSTOSCOPY Diagnosis:       Bladder stone      (BLADDER STONE)    Surgeons: Coleen Carpenter MD Responsible Provider: Jarvis Pinzon MD    Anesthesia Type: general ASA Status: 3          Anesthesia Type: No value filed.     Diya Phase I: Diya Score: 10    Diya Phase II:        Anesthesia Post Evaluation

## 2022-11-14 LAB
CULTURE: NORMAL
SPECIMEN DESCRIPTION: NORMAL
STONE COMPOSITION: NORMAL
STONE DESCRIPTION: NORMAL
STONE MASS: 9631 MG

## 2022-11-17 ENCOUNTER — PROCEDURE VISIT (OUTPATIENT)
Dept: UROLOGY | Age: 27
End: 2022-11-17

## 2022-11-17 VITALS — BODY MASS INDEX: 36.71 KG/M2 | WEIGHT: 187 LBS | HEIGHT: 60 IN

## 2022-11-17 DIAGNOSIS — N21.0 CALCULUS OF URINARY BLADDER: Primary | ICD-10-CM

## 2022-11-17 PROCEDURE — 99999 PR OFFICE/OUTPT VISIT,PROCEDURE ONLY: CPT | Performed by: NURSE PRACTITIONER

## 2022-11-17 NOTE — PROGRESS NOTES
Patient presents to office for 16F catheter removal. Balloon deflated and catheter was removed with no complications. Patient tolerated procedure well. Patient will keep follow up appointment with provider and will call the office with any questions or concerns.

## 2022-11-28 ENCOUNTER — TELEPHONE (OUTPATIENT)
Dept: NEUROLOGY | Age: 27
End: 2022-11-28

## 2022-11-28 NOTE — TELEPHONE ENCOUNTER
KELBY called stating she had a seizure on Thanksgiving that lasted about 5 minutes. Her family reported that she was convulsing intensely. She stated there was no injuries noted. No loss of bowel or bladder. She said was walking providing drinks to her  prior to the event, then clenched onto him and that was the last she remembered. She was having some anxiety at that time. She is scheduling her EEG right now. Please advise.

## 2022-11-28 NOTE — TELEPHONE ENCOUNTER
Patient called and scheduled her LTME with me this morning. She is scheduled for Tuesday, December 6th.

## 2022-11-28 NOTE — TELEPHONE ENCOUNTER
Patient called the office this morning to schedule her LTME. Test scheduled for Tuesday, December 6th. Patient instructed that on the day of admission she will be contacted by our office when a room is available and not to go to the hospital before then. Patient verbally stated her understanding.

## 2022-11-29 DIAGNOSIS — G40.909 SEIZURE DISORDER (HCC): Primary | ICD-10-CM

## 2022-11-29 NOTE — TELEPHONE ENCOUNTER
Spoke with Stevan Paz about the lab orders and she will be going to [facility] to have them done. Lab orders were faxed to the hospital per the request of the patient.

## 2022-11-29 NOTE — TELEPHONE ENCOUNTER
I have put in for a metabolic panel and trileptal level. Won't adjust medications now. Will follow up on Eeg.

## 2022-12-01 NOTE — TELEPHONE ENCOUNTER
Bety Echavarria called the office today to cancel her LTME. Patient stated that her father isn't available on the day that she scheduled. She will confirm with her father and call me back to reschedule.

## 2022-12-17 DIAGNOSIS — G40.909 SEIZURE DISORDER (HCC): ICD-10-CM

## 2022-12-19 NOTE — TELEPHONE ENCOUNTER
Pharmacy requesting refill of Onfi. DR. Barba Solian PATIENT AND SHE IS OUT OF THE OFFICE.     Medication active on med list yes      Date of last fill: 6/20/22  with 5 refills verified on 12/19/22  verified by FRITZ RN      Date of last appointment 10/31/22    Next Visit Date:  5/1/2023

## 2022-12-20 RX ORDER — CLOBAZAM 10 MG/1
TABLET ORAL
Qty: 30 TABLET | Refills: 3 | Status: SHIPPED | OUTPATIENT
Start: 2022-12-20 | End: 2023-06-21

## 2023-02-27 DIAGNOSIS — G40.909 SEIZURE DISORDER (HCC): ICD-10-CM

## 2023-02-27 RX ORDER — OXCARBAZEPINE 300 MG/1
TABLET, FILM COATED ORAL
Qty: 360 TABLET | Refills: 4 | Status: SHIPPED | OUTPATIENT
Start: 2023-02-27

## 2023-02-27 NOTE — TELEPHONE ENCOUNTER
Pharmacy requesting refill of OXcarbazepine (TRILEPTAL) 300 MG tablet       Medication active on med list yes      Date of last Rx: 02/14/2022 with 4 refills          verified by SOY BENAVIDEZ      Date of last appointment 10/31/2022    Next Visit Date:  5/1/2023      Please fill for Dr. Nataly Owens as she is currently out of office.

## 2023-03-27 ENCOUNTER — TELEPHONE (OUTPATIENT)
Dept: NEUROLOGY | Age: 28
End: 2023-03-27

## 2023-03-27 NOTE — TELEPHONE ENCOUNTER
[facility] called and spkoe with Dr. Willard Scruggs. Azar Castillo was in the ER today due to seizure. They were going to discharge her home. Dr. Willard Scruggs told them we will try to work her in sooner withDr. Blaine Macias. I called and lm asking for return call. Adolph Hearn does have an opening on Thursday.

## 2023-05-07 ENCOUNTER — HOSPITAL ENCOUNTER (INPATIENT)
Age: 28
LOS: 1 days | Discharge: HOME | DRG: 880 | End: 2023-05-08
Attending: PSYCHIATRY & NEUROLOGY | Admitting: PSYCHIATRY & NEUROLOGY
Payer: MEDICARE

## 2023-05-07 DIAGNOSIS — G40.909 SEIZURE DISORDER (HCC): ICD-10-CM

## 2023-05-07 PROBLEM — R56.9 SEIZURE (HCC): Status: ACTIVE | Noted: 2023-05-07

## 2023-05-07 PROBLEM — G40.919 BREAKTHROUGH SEIZURE (HCC): Status: ACTIVE | Noted: 2023-05-07

## 2023-05-07 PROCEDURE — 6360000002 HC RX W HCPCS: Performed by: STUDENT IN AN ORGANIZED HEALTH CARE EDUCATION/TRAINING PROGRAM

## 2023-05-07 PROCEDURE — 6370000000 HC RX 637 (ALT 250 FOR IP): Performed by: STUDENT IN AN ORGANIZED HEALTH CARE EDUCATION/TRAINING PROGRAM

## 2023-05-07 PROCEDURE — 95700 EEG CONT REC W/VID EEG TECH: CPT

## 2023-05-07 PROCEDURE — 99223 1ST HOSP IP/OBS HIGH 75: CPT | Performed by: PSYCHIATRY & NEUROLOGY

## 2023-05-07 PROCEDURE — 2580000003 HC RX 258: Performed by: STUDENT IN AN ORGANIZED HEALTH CARE EDUCATION/TRAINING PROGRAM

## 2023-05-07 PROCEDURE — 95711 VEEG 2-12 HR UNMONITORED: CPT

## 2023-05-07 PROCEDURE — 2060000000 HC ICU INTERMEDIATE R&B

## 2023-05-07 RX ORDER — OXCARBAZEPINE 300 MG/1
600 TABLET, FILM COATED ORAL 2 TIMES DAILY
Status: DISCONTINUED | OUTPATIENT
Start: 2023-05-07 | End: 2023-05-08 | Stop reason: HOSPADM

## 2023-05-07 RX ORDER — ENOXAPARIN SODIUM 100 MG/ML
40 INJECTION SUBCUTANEOUS DAILY
Status: DISCONTINUED | OUTPATIENT
Start: 2023-05-07 | End: 2023-05-08 | Stop reason: HOSPADM

## 2023-05-07 RX ORDER — SODIUM CHLORIDE 0.9 % (FLUSH) 0.9 %
5-40 SYRINGE (ML) INJECTION EVERY 12 HOURS SCHEDULED
Status: DISCONTINUED | OUTPATIENT
Start: 2023-05-07 | End: 2023-05-08 | Stop reason: HOSPADM

## 2023-05-07 RX ORDER — ONDANSETRON 4 MG/1
4 TABLET, ORALLY DISINTEGRATING ORAL EVERY 8 HOURS PRN
Status: DISCONTINUED | OUTPATIENT
Start: 2023-05-07 | End: 2023-05-08 | Stop reason: HOSPADM

## 2023-05-07 RX ORDER — LORAZEPAM 2 MG/ML
1 INJECTION INTRAMUSCULAR EVERY 5 MIN PRN
Status: DISCONTINUED | OUTPATIENT
Start: 2023-05-07 | End: 2023-05-08

## 2023-05-07 RX ORDER — LORAZEPAM 2 MG/ML
INJECTION INTRAMUSCULAR
Status: DISCONTINUED
Start: 2023-05-07 | End: 2023-05-07 | Stop reason: WASHOUT

## 2023-05-07 RX ORDER — ACETAMINOPHEN 325 MG/1
650 TABLET ORAL EVERY 6 HOURS PRN
Status: DISCONTINUED | OUTPATIENT
Start: 2023-05-07 | End: 2023-05-08 | Stop reason: HOSPADM

## 2023-05-07 RX ORDER — ONDANSETRON 2 MG/ML
4 INJECTION INTRAMUSCULAR; INTRAVENOUS EVERY 6 HOURS PRN
Status: DISCONTINUED | OUTPATIENT
Start: 2023-05-07 | End: 2023-05-08 | Stop reason: HOSPADM

## 2023-05-07 RX ORDER — CITALOPRAM 20 MG/1
20 TABLET ORAL DAILY
Status: DISCONTINUED | OUTPATIENT
Start: 2023-05-07 | End: 2023-05-08 | Stop reason: HOSPADM

## 2023-05-07 RX ORDER — SODIUM CHLORIDE 9 MG/ML
INJECTION, SOLUTION INTRAVENOUS PRN
Status: DISCONTINUED | OUTPATIENT
Start: 2023-05-07 | End: 2023-05-08 | Stop reason: HOSPADM

## 2023-05-07 RX ORDER — ACETAMINOPHEN 650 MG/1
650 SUPPOSITORY RECTAL EVERY 6 HOURS PRN
Status: DISCONTINUED | OUTPATIENT
Start: 2023-05-07 | End: 2023-05-08 | Stop reason: HOSPADM

## 2023-05-07 RX ORDER — HYDROXYZINE HYDROCHLORIDE 25 MG/1
25 TABLET, FILM COATED ORAL NIGHTLY PRN
COMMUNITY
Start: 2023-03-09

## 2023-05-07 RX ORDER — FAMOTIDINE 20 MG/1
20 TABLET, FILM COATED ORAL 2 TIMES DAILY
Status: DISCONTINUED | OUTPATIENT
Start: 2023-05-07 | End: 2023-05-08 | Stop reason: HOSPADM

## 2023-05-07 RX ORDER — SODIUM CHLORIDE 0.9 % (FLUSH) 0.9 %
5-40 SYRINGE (ML) INJECTION PRN
Status: DISCONTINUED | OUTPATIENT
Start: 2023-05-07 | End: 2023-05-08 | Stop reason: HOSPADM

## 2023-05-07 RX ORDER — CLOBAZAM 10 MG/1
10 TABLET ORAL DAILY
Status: DISCONTINUED | OUTPATIENT
Start: 2023-05-07 | End: 2023-05-08

## 2023-05-07 RX ORDER — POLYETHYLENE GLYCOL 3350 17 G/17G
17 POWDER, FOR SOLUTION ORAL DAILY PRN
Status: DISCONTINUED | OUTPATIENT
Start: 2023-05-07 | End: 2023-05-08 | Stop reason: HOSPADM

## 2023-05-07 RX ORDER — CHOLECALCIFEROL (VITAMIN D3) 125 MCG
5 CAPSULE ORAL NIGHTLY
Status: DISCONTINUED | OUTPATIENT
Start: 2023-05-07 | End: 2023-05-08 | Stop reason: HOSPADM

## 2023-05-07 RX ADMIN — SODIUM CHLORIDE, PRESERVATIVE FREE 10 ML: 5 INJECTION INTRAVENOUS at 20:17

## 2023-05-07 RX ADMIN — CITALOPRAM 20 MG: 20 TABLET, FILM COATED ORAL at 08:43

## 2023-05-07 RX ADMIN — SODIUM CHLORIDE, PRESERVATIVE FREE 10 ML: 5 INJECTION INTRAVENOUS at 08:43

## 2023-05-07 RX ADMIN — Medication 5 MG: at 20:14

## 2023-05-07 RX ADMIN — LORAZEPAM 1 MG: 2 INJECTION INTRAMUSCULAR; INTRAVENOUS at 20:47

## 2023-05-07 RX ADMIN — CLOBAZAM 10 MG: 10 TABLET ORAL at 08:43

## 2023-05-07 RX ADMIN — OXCARBAZEPINE 600 MG: 300 TABLET, FILM COATED ORAL at 08:43

## 2023-05-07 RX ADMIN — FAMOTIDINE 20 MG: 20 TABLET, FILM COATED ORAL at 08:43

## 2023-05-07 RX ADMIN — OXCARBAZEPINE 600 MG: 300 TABLET, FILM COATED ORAL at 20:15

## 2023-05-07 RX ADMIN — ENOXAPARIN SODIUM 40 MG: 40 INJECTION SUBCUTANEOUS at 08:43

## 2023-05-08 VITALS
HEIGHT: 59 IN | TEMPERATURE: 98.3 F | WEIGHT: 171 LBS | RESPIRATION RATE: 16 BRPM | HEART RATE: 67 BPM | OXYGEN SATURATION: 95 % | BODY MASS INDEX: 34.47 KG/M2 | DIASTOLIC BLOOD PRESSURE: 78 MMHG | SYSTOLIC BLOOD PRESSURE: 106 MMHG

## 2023-05-08 PROBLEM — R56.9 CONVULSIONS (HCC): Status: ACTIVE | Noted: 2023-05-08

## 2023-05-08 PROCEDURE — 6360000002 HC RX W HCPCS: Performed by: STUDENT IN AN ORGANIZED HEALTH CARE EDUCATION/TRAINING PROGRAM

## 2023-05-08 PROCEDURE — 95720 EEG PHY/QHP EA INCR W/VEEG: CPT | Performed by: PSYCHIATRY & NEUROLOGY

## 2023-05-08 PROCEDURE — 6370000000 HC RX 637 (ALT 250 FOR IP)

## 2023-05-08 PROCEDURE — 6370000000 HC RX 637 (ALT 250 FOR IP): Performed by: STUDENT IN AN ORGANIZED HEALTH CARE EDUCATION/TRAINING PROGRAM

## 2023-05-08 PROCEDURE — 99213 OFFICE O/P EST LOW 20 MIN: CPT

## 2023-05-08 PROCEDURE — 6370000000 HC RX 637 (ALT 250 FOR IP): Performed by: NURSE PRACTITIONER

## 2023-05-08 PROCEDURE — 95718 EEG PHYS/QHP 2-12 HR W/VEEG: CPT | Performed by: PSYCHIATRY & NEUROLOGY

## 2023-05-08 PROCEDURE — 2580000003 HC RX 258: Performed by: STUDENT IN AN ORGANIZED HEALTH CARE EDUCATION/TRAINING PROGRAM

## 2023-05-08 PROCEDURE — 95711 VEEG 2-12 HR UNMONITORED: CPT

## 2023-05-08 RX ORDER — CLOBAZAM 10 MG/1
10 TABLET ORAL NIGHTLY
Qty: 30 TABLET | Refills: 3 | Status: SHIPPED | OUTPATIENT
Start: 2023-05-08 | End: 2023-05-08 | Stop reason: SDUPTHER

## 2023-05-08 RX ORDER — ZIPRASIDONE HYDROCHLORIDE 40 MG/1
40 CAPSULE ORAL NIGHTLY
Qty: 60 CAPSULE | Refills: 3 | Status: SHIPPED | OUTPATIENT
Start: 2023-05-08 | End: 2023-05-08 | Stop reason: SDUPTHER

## 2023-05-08 RX ORDER — AMMONIA INHALANTS 0.04 G/.3ML
0.3 INHALANT RESPIRATORY (INHALATION) ONCE
Status: COMPLETED | OUTPATIENT
Start: 2023-05-08 | End: 2023-05-08

## 2023-05-08 RX ORDER — ALPRAZOLAM 0.25 MG/1
0.25 TABLET ORAL ONCE
Status: COMPLETED | OUTPATIENT
Start: 2023-05-08 | End: 2023-05-08

## 2023-05-08 RX ORDER — CITALOPRAM 10 MG/1
30 TABLET ORAL DAILY
Qty: 90 TABLET | Refills: 3 | Status: SHIPPED | OUTPATIENT
Start: 2023-05-09

## 2023-05-08 RX ORDER — CLOBAZAM 10 MG/1
10 TABLET ORAL NIGHTLY
Status: DISCONTINUED | OUTPATIENT
Start: 2023-05-08 | End: 2023-05-08 | Stop reason: HOSPADM

## 2023-05-08 RX ORDER — ZIPRASIDONE HYDROCHLORIDE 40 MG/1
40 CAPSULE ORAL NIGHTLY
Qty: 60 CAPSULE | Refills: 3 | Status: SHIPPED | OUTPATIENT
Start: 2023-05-08

## 2023-05-08 RX ORDER — DOCUSATE SODIUM 100 MG/1
100 CAPSULE, LIQUID FILLED ORAL DAILY PRN
Status: DISCONTINUED | OUTPATIENT
Start: 2023-05-08 | End: 2023-05-08 | Stop reason: HOSPADM

## 2023-05-08 RX ORDER — CLOBAZAM 10 MG/1
10 TABLET ORAL NIGHTLY
Qty: 30 TABLET | Refills: 3 | Status: SHIPPED | OUTPATIENT
Start: 2023-05-08 | End: 2023-11-07

## 2023-05-08 RX ORDER — ZIPRASIDONE HYDROCHLORIDE 40 MG/1
40 CAPSULE ORAL NIGHTLY
Status: DISCONTINUED | OUTPATIENT
Start: 2023-05-08 | End: 2023-05-08 | Stop reason: HOSPADM

## 2023-05-08 RX ORDER — CITALOPRAM 20 MG/1
20 TABLET ORAL DAILY
Qty: 30 TABLET | Refills: 3 | Status: SHIPPED | OUTPATIENT
Start: 2023-05-09 | End: 2023-05-08 | Stop reason: SDUPTHER

## 2023-05-08 RX ORDER — CITALOPRAM 10 MG/1
30 TABLET ORAL DAILY
Qty: 90 TABLET | Refills: 3 | Status: SHIPPED | OUTPATIENT
Start: 2023-05-09 | End: 2023-05-08 | Stop reason: SDUPTHER

## 2023-05-08 RX ADMIN — CITALOPRAM 20 MG: 20 TABLET, FILM COATED ORAL at 08:45

## 2023-05-08 RX ADMIN — FAMOTIDINE 20 MG: 20 TABLET, FILM COATED ORAL at 08:45

## 2023-05-08 RX ADMIN — ONDANSETRON 4 MG: 2 INJECTION INTRAMUSCULAR; INTRAVENOUS at 15:02

## 2023-05-08 RX ADMIN — OXCARBAZEPINE 600 MG: 300 TABLET, FILM COATED ORAL at 10:16

## 2023-05-08 RX ADMIN — ALPRAZOLAM 0.25 MG: 0.25 TABLET ORAL at 16:51

## 2023-05-08 RX ADMIN — SODIUM CHLORIDE, PRESERVATIVE FREE 5 ML: 5 INJECTION INTRAVENOUS at 08:42

## 2023-05-08 RX ADMIN — AMMONIA INHALANTS 0.3 ML: 0.04 INHALANT RESPIRATORY (INHALATION) at 14:57

## 2023-05-08 NOTE — DISCHARGE SUMMARY
[address] [facility]  [address] [facility]    Discharge Summary     Patient ID: Maximilian Solorzano  :  1995   MRN: [id number]     ACCOUNT:  [de-identified]   Patient's PCP: Alexis Cooks, MD  Admit Date: 2023   Discharge Date: 2023     Length of Stay: 1  Code Status:  Full Code  Admitting Physician: Yvonne Narayanan DO  Discharge Physician: Bud Gates, APRN - CNP     Active Discharge Diagnoses:     Hospital Problem Lists:  Principal Problem:    Breakthrough seizure ([address]  Active Problems:    Seizure-like activity ([address]  Resolved Problems:    * No resolved hospital problems. *      Admission Condition:  fair     Discharged Condition: fair    Hospital Stay:   HPI:  Maximilian Solorzano is a 32 y.o. female with H/O epileptic & non-epileptic seizures, L5-S1 spina bifida with shunted hydrocephalus, s/p release of tethered cord (2014), neurogenic bladder, who was admitted 2023 for elective LTME. Patient reported following with  Caroline Alva till 3/2021 for her seizure episodes. She was dismissed from the clinic in 2021 due to multiple missed appointments. Then she started following up with Dr. Mc Ayala since 2022. Typical episode was described as whole face tingling, jaw clenching, rolling of eyes back and forth followed by generalized shaking. She denied incontinence or tongue bite. She reported screaming and feeling fatigued postictally. Typical episodes last from few seconds to 30 minutes. Last video EEG was done in 10/2020 that was reported normal.  Currently she is taking Onfi 10 mg daily and Trileptal 600 mg twice daily. Her last clinical visit was on 10/31/2022. Patient reported high level of stress and anxiety that usually trigger her episodes. During that visit she reported last seizure when she stopped breathing and had entire body convulsions. Denied tongue biting & incontinence. Whole episode lasted for 2 to 3 minutes. She was confused afterwards.

## 2023-05-08 NOTE — PLAN OF CARE
Problem: Discharge Planning  Goal: Discharge to home or other facility with appropriate resources  5/8/2023 0555 by Simi Coats RN  Outcome: Progressing  5/7/2023 1734 by Lisa Alfonso RN  Outcome: Progressing  Flowsheets (Taken 5/7/2023 0800)  Discharge to home or other facility with appropriate resources: Identify barriers to discharge with patient and caregiver     Problem: Safety - Adult  Goal: Free from fall injury  5/8/2023 0555 by Simi Coats RN  Outcome: Progressing  5/7/2023 1734 by Lisa Alfonso RN  Outcome: Progressing     Problem: Neurosensory - Adult  Goal: Achieves stable or improved neurological status  5/8/2023 0555 by Simi Coats RN  Outcome: Progressing  5/7/2023 1734 by Lisa Alfonso RN  Outcome: Progressing  Flowsheets (Taken 5/7/2023 0800)  Achieves stable or improved neurological status: Assess for and report changes in neurological status  Goal: Absence of seizures  5/8/2023 0555 by Simi Coats RN  Outcome: Progressing  5/7/2023 1734 by Lisa Alfonso RN  Outcome: Progressing  Flowsheets (Taken 5/7/2023 0800)  Absence of seizures: Monitor for seizure activity.   If seizure occurs, document type and location of movements and any associated apnea  Goal: Remains free of injury related to seizures activity  5/8/2023 0555 by Simi Coats RN  Outcome: Progressing  5/7/2023 1734 by Lisa Alfonso RN  Outcome: Progressing  Flowsheets (Taken 5/7/2023 0800)  Remains free of injury related to seizure activity: Maintain airway, patient safety  and administer oxygen as ordered  Goal: Achieves maximal functionality and self care  5/8/2023 0555 by Simi Coats RN  Outcome: Progressing  5/7/2023 1734 by Lisa Alfonso RN  Outcome: Progressing  Flowsheets (Taken 5/7/2023 0800)  Achieves maximal functionality and self care: Monitor swallowing and airway patency with patient fatigue and changes in neurological status     Problem: Pain  Goal: Verbalizes/displays adequate

## 2023-05-08 NOTE — PROCEDURES
LONG-TERM EEG-VIDEO [id number]    Patient: Zena Handy  Age: 32 y.o. MRN: [id number]    Referring Physician: Zaheer Sommer  History: The patient is a 32 y.o. female who presented breakthrough seizure/encephalopathy. This long-term video-EEG monitoring study was performed to determine the nature of the patient's clinical events. The patient is on neuroactive medications.    Brittanie Segovia   Current Facility-Administered Medications   Medication Dose Route Frequency Provider Last Rate Last Admin    sodium chloride flush 0.9 % injection 5-40 mL  5-40 mL IntraVENous 2 times per day Samuel Barbosa MD   10 mL at 05/07/23 2017    sodium chloride flush 0.9 % injection 5-40 mL  5-40 mL IntraVENous PRN Samuel Barbosa MD        0.9 % sodium chloride infusion   IntraVENous PRN Samuel Barbosa MD        LORazepam (ATIVAN) injection 1 mg  1 mg IntraVENous Q5 Min PRN Samuel Barbosa MD   1 mg at 05/07/23 2047    enoxaparin (LOVENOX) injection 40 mg  40 mg SubCUTAneous Daily Samuel Barbosa MD   40 mg at 05/07/23 0843    ondansetron (ZOFRAN-ODT) disintegrating tablet 4 mg  4 mg Oral Q8H PRN Samuel Barbosa MD        Or    ondansetron (ZOFRAN) injection 4 mg  4 mg IntraVENous Q6H PRN Samuel Barbosa MD        polyethylene glycol (GLYCOLAX) packet 17 g  17 g Oral Daily PRN Samuel Barbosa MD        acetaminophen (TYLENOL) tablet 650 mg  650 mg Oral Q6H PRN Samuel Barbosa MD        Or    acetaminophen (TYLENOL) suppository 650 mg  650 mg Rectal Q6H PRN Samuel Barbosa MD        citalopram (CELEXA) tablet 20 mg  20 mg Oral Daily Samuel Barbosa MD   20 mg at 05/07/23 0843    cloBAZam (ONFI) tablet 10 mg  10 mg Oral Daily Samuel Barbosa MD   10 mg at 05/07/23 0843    famotidine (PEPCID) tablet 20 mg  20 mg Oral BID Samuel Barbsoa MD   20 mg at 05/07/23 0843    OXcarbazepine (TRILEPTAL) tablet 600 mg  600 mg Oral BID Jarod

## 2023-05-08 NOTE — CARE COORDINATION
Case Management Assessment  Initial Evaluation    Date/Time of Evaluation: 5/8/2023 2:40 PM  Assessment Completed by: Renita Sandoval RN    If patient is discharged prior to next notation, then this note serves as note for discharge by case management. Patient Name: Ela Dang                   YOB: 1995  Diagnosis: Breakthrough seizure ([facility] [G40.919]  Seizure ([facility] [R56.9]                   Date / Time: 5/7/2023  3:39 AM    Patient Admission Status: Inpatient   Readmission Risk (Low < 19, Mod (19-27), High > 27): Readmission Risk Score: 10.6    Current PCP: Teri Shields MD  PCP verified by CM? (P) Yes Giancarlo Marte MD)    Chart Reviewed: Yes      History Provided by: (P) Patient  Patient Orientation: (P) Alert and Oriented, Person, Place, Situation, Self    Patient Cognition: (P) Alert    Hospitalization in the last 30 days (Readmission):  No    If yes, Readmission Assessment in CM Navigator will be completed.     Advance Directives:      Code Status: Full Code   Patient's Primary Decision Maker is: (P) Legal Next of Kin      Discharge Planning:    Patient lives with: (P) Spouse/Significant Other Type of Home: (P) House  Primary Care Giver: (P) Self  Patient Support Systems include: (P) Spouse/Significant Other, Parent, Family Members   Current Financial resources: (P) Medicare, Medicaid  Current community resources: (P) None  Current services prior to admission: (P) None            Current DME:  none            Type of Home Care services:  (P) None    ADLS  Prior functional level: (P) Independent in ADLs/IADLs  Current functional level: (P) Independent in ADLs/IADLs    PT AM-PAC:   /24  OT AM-PAC:   /24    Family can provide assistance at DC: (P) Yes  Would you like Case Management to discuss the discharge plan with any other family members/significant others, and if so, who? (P) No  Plans to Return to Present Housing: (P) Yes  Other Identified Issues/Barriers to RETURNING

## 2023-05-08 NOTE — PLAN OF CARE
Pt/ were not happy being D/C'd & wanted to discuss med change. The writer spoke to pt & her  in detail. We agreed to go up on the dose of Celexa from 20 mg daily to 30 mg daily; to give some time for the body to adjust to the new dose. Later pt's father arrived who stated that he didn't think pt should be D/C'd as she was still having \"episodes\". Writer got called again to the room. Spoke with pt, her  & father at the bed side. Father was worried about ongoing spells. Family was updated on LTME result. Writer spoke in detail regarding the link between pseudoseizures and stress/anxiety. Increasing the dose of AEDs will not help the pseudoseizures. Patient stated that she does not have any stress. However, as per Dr. Alfred Meadows yesterday F/U note states \"Pt admits to significant amount of stress. Admits to following with psych and a counselor. Offered psych, social work consults and pt respectively declined. Also admits to missing her meds at least 3 days this past week. States she does not want her AEDs increased as this caused issues in the past.\"      Plan is patient will follow-up with her psychiatrist and counselor to work through her anxiety. She will also follow-up with Dr. Santino De Leon as outpatient. While we were discussing the above, patient had another episode of anxiety/panic attack & she started crying, stating \"I don't want to go into another seizure\". Xanax 0.25 mg x 1 was ordered. Father &  were informed that this was just [place] acute fix\"; we can't prescribe Xanax long-term. They agreed & verbalized understanding. RN Bam Bustillo, was in the room during this conversation. Dr. Rodrigo Barboza was updated.

## 2023-05-08 NOTE — PROGRESS NOTES
[facility] Nurse  Consult Note       NAME:  Joe Arevalo  MEDICAL RECORD NUMBER:  [id number]  AGE: 32 y.o. GENDER: female  : 1995  TODAY'S DATE:  2023    Subjective:     Reason for WOCN Evaluation and Assessment: wound on R heel      Joe Arevalo is a 32 y.o. 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 Attends the wound care center at [facility]. Recently using a collagen dressing, dry dressing and tape to secure.      Objective:      /61   Pulse 84   Temp 97.7 °F (36.5 °C) (Oral)   Resp 17   Ht 4' 11\" (1.499 m)   Wt 171 lb (77.6 kg)   LMP 2012   SpO2 92%   BMI 34.54 kg/m²   Gurdeep Risk Score: Gurdeep Scale Score: 16    LABS    CBC:   Lab Results   Component Value Date/Time    WBC 9.8 2022 04:05 PM    RBC 4.45 2022 04:05 PM    HGB 12.6 10/27/2022 04:00 PM     CMP:  Albumin:    Lab Results   Component Value Date/Time    LABALBU 4.6 2022 04:05 PM     PT/INR:    Lab Results   Component Value Date/Time    PROTIME 10.0 2014 11:20 AM    INR 1.0 2014 11:20 AM     HgBA1c:  No results found for: LABA1C  PTT: No components found for: LABPTT      Assessment:       Measurements:     23 1038   Wound 23 Heel Right fissure   Date First Assessed/Time First Assessed: 23 1038   Present on Hospital Admission: Yes  Location: Heel  Wound Location Orientation: Right  Wound Description (Comments): fissure   Wound Image    Wound Etiology Other   Dressing Status New dressing applied   Wound Cleansed Soap and water   Dressing/Treatment Antibacterial ointment;Dry dressing   Dressing Change Due 23   Wound Length (cm) 1.2 cm   Wound Width (cm) 0.2 cm   Wound Depth (cm) 0.3 cm   Wound Surface Area (cm^2) 0.24 cm^2   Wound Volume (cm^3) 0.072 cm^3   Wound Assessment Devitalized tissue;Dry   Drainage Amount Scant   Drainage Description

## 2023-05-08 NOTE — CARE COORDINATION
Attempted to see patient for initial assessment. Patient sleeping soundly and unable to awaken upon calling out name. Will try again at a later time as time allows. 1255 Attempted again to see patient for initial assessment. Patient continues to sleep soundly. Will try again later as time allows. 1421 Attempted again to see patient for initial assessment. Patient continues to sleep soundly and unable to awaken upon calling out name. Will try again later as time allows.       Discharge 1 [facility] Case Management Department  Written by: Jett Prieto RN    Patient Name: Osman Aggarwal  Attending Provider: Carmel rBannon DO  Admit Date: 2023  3:39 AM  MRN: [id number]  Account: [de-identified]                     : 1995  Discharge Date:       Disposition: home    Jett Prieto RN

## 2023-05-08 NOTE — PROGRESS NOTES
Neurology Nurse Practitioner Progress Note      INTERVAL HISTORY: This is a 32 y.o.  female admitted 5/7/2023 for elective LTME. This is a follow-up neurology progress note. The patient was examined and the chart was reviewed. Discussed with the pt & RN. There were no acute events overnight. No new motor, sensory, visual or bulbar symptoms. LTME was D/C'd. Pt requested to increase the dose of Celexa due to uncontrolled anxiety. She's ready for D/C  home. HPI: Kimani Solorzano is a 32 y.o. 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 Patient reported following with  Elena Alexandra till 3/2021 for her seizure episodes. She was dismissed from the clinic in 6/2021 due to multiple missed appointments. Then she started following up with Dr. Sandra Karimi since 1/2022. Typical episode was described as whole face tingling, jaw clenching, rolling of eyes back and forth followed by generalized shaking. She denied incontinence or tongue bite. She reported screaming and feeling fatigued postictally. Typical episodes last from few seconds to 30 minutes. Last video EEG was done in 10/2020 that was reported normal.  Currently she is taking Onfi 10 mg daily and Trileptal 600 mg twice daily. Her last clinical visit was on 10/31/2022. Patient reported high level of stress and anxiety that usually trigger her episodes. During that visit she reported last seizure when she stopped breathing and had entire body convulsions. Denied tongue biting & incontinence. Whole episode lasted for 2 to 3 minutes. She was confused afterwards. She also reported mini seizures, described as palpitations and rapid eye movements. Video EEG monitoring was recommended to capture and characterize her typical spells. Patient presented as a direct admit to [facility] on 5/7/2023.      HOSPITAL COURSE:

## 2023-05-09 NOTE — PROCEDURES
Referring physician: Bud CAMPBELL  Date:5/8/2023  Start Time:5/8/2023 @ 0730  End Time: 5/8/2023 @     Indication  Patient with possible seizures. Introduction  This continuous video-EEG was acquired using a [employer] workstation at 256 samples/s. Electrodes were placed according to the International 10-20 system. Automated spike and seizure detection algorithms were applied. Video was recorded during this study. Description  During the maximal alert state, a well-regulated 9 Hz posterior dominant rhythm was seen which was symmetrical and attenuated to eye opening. No consistent focal slowing or interhemispheric asymmetry was noted. Normal sleep structures were observed. There were no interictal epileptiform discharges or electrographic seizures. There were few left temporal discharges bit felt not outstanding from background and thus felt non epileptic in origin. Events  No events reported. Impression  Normal continuous video-EEG. No epileptiform discharges were identified. Please note the absence of   such activity in this record cannot conclusively rule out an epileptic   disorder. If such is still clinically suspected, a repeat study with   prolonged sampling may be helpful. Gloria Saavedra MD  Epilepsy Board Certified. Neurology Board Certified.     Electronically Signed

## 2023-05-10 NOTE — PROGRESS NOTES
CLINICAL PHARMACY NOTE: MEDS TO BEDS    Total # of Prescriptions Filled: 2   The following medications were delivered to the patient:  Clobazm 10mg   Citalopram 10mg       Additional Documentation:   Celexa 20mg and ziprasidone too soonshorty

## 2023-08-12 ENCOUNTER — APPOINTMENT (OUTPATIENT)
Dept: GENERAL RADIOLOGY | Age: 28
End: 2023-08-12
Payer: MEDICARE

## 2023-08-12 ENCOUNTER — HOSPITAL ENCOUNTER (EMERGENCY)
Age: 28
Discharge: HOME | End: 2023-08-13
Attending: STUDENT IN AN ORGANIZED HEALTH CARE EDUCATION/TRAINING PROGRAM
Payer: MEDICARE

## 2023-08-12 ENCOUNTER — APPOINTMENT (OUTPATIENT)
Dept: CT IMAGING | Age: 28
End: 2023-08-12
Payer: MEDICARE

## 2023-08-12 DIAGNOSIS — R07.81 RIB PAIN: Primary | ICD-10-CM

## 2023-08-12 DIAGNOSIS — N30.01 ACUTE CYSTITIS WITH HEMATURIA: ICD-10-CM
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
## 2023-08-12 LAB
ALBUMIN SERPL-MCNC: 4.1 G/DL (ref 3.5–5.2)
ALBUMIN/GLOB SERPL: 1.6 {RATIO} (ref 1–2.5)
ALP SERPL-CCNC: 113 U/L (ref 35–104)
ALT SERPL-CCNC: 12 U/L (ref 5–33)
ANION GAP SERPL CALCULATED.3IONS-SCNC: 9 MMOL/L (ref 9–17)
AST SERPL-CCNC: 12 U/L
BASOPHILS # BLD: 0.1 K/UL (ref 0–0.2)
BASOPHILS NFR BLD: 1 % (ref 0–2)
BILIRUB DIRECT SERPL-MCNC: 0.1 MG/DL
BILIRUB INDIRECT SERPL-MCNC: 0.1 MG/DL (ref 0–1)
BILIRUB SERPL-MCNC: 0.2 MG/DL (ref 0.3–1.2)
BUN SERPL-MCNC: 8 MG/DL (ref 6–20)
CALCIUM SERPL-MCNC: 8 MG/DL (ref 8.6–10.4)
CHLORIDE SERPL-SCNC: 105 MMOL/L (ref 98–107)
CO2 SERPL-SCNC: 23 MMOL/L (ref 20–31)
CREAT SERPL-MCNC: <0.4 MG/DL (ref 0.5–0.9)
EOSINOPHIL # BLD: 0.1 K/UL (ref 0–0.4)
EOSINOPHILS RELATIVE PERCENT: 1 % (ref 1–4)
ERYTHROCYTE [DISTWIDTH] IN BLOOD BY AUTOMATED COUNT: 13.5 % (ref 12.5–15.4)
GFR SERPL CREATININE-BSD FRML MDRD: ABNORMAL ML/MIN/1.73M2
GLUCOSE SERPL-MCNC: 92 MG/DL (ref 70–99)
HCG UR QL: NEGATIVE
HCT VFR BLD AUTO: 38.7 % (ref 36–46)
HGB BLD-MCNC: 13 G/DL (ref 12–16)
LIPASE SERPL-CCNC: 31 U/L (ref 13–60)
LYMPHOCYTES NFR BLD: 1.7 K/UL (ref 1–4.8)
LYMPHOCYTES RELATIVE PERCENT: 16 % (ref 24–44)
MAGNESIUM SERPL-MCNC: 2.4 MG/DL (ref 1.6–2.6)
MCH RBC QN AUTO: 30.7 PG (ref 26–34)
MCHC RBC AUTO-ENTMCNC: 33.7 G/DL (ref 31–37)
MCV RBC AUTO: 91.2 FL (ref 80–100)
MONOCYTES NFR BLD: 0.8 K/UL (ref 0.1–1.2)
MONOCYTES NFR BLD: 8 % (ref 2–11)
NEUTROPHILS NFR BLD: 74 % (ref 36–66)
NEUTS SEG NFR BLD: 7.6 K/UL (ref 1.8–7.7)
PLATELET # BLD AUTO: 345 K/UL (ref 140–450)
PMV BLD AUTO: 6.8 FL (ref 6–12)
POTASSIUM SERPL-SCNC: 4.1 MMOL/L (ref 3.7–5.3)
PROT SERPL-MCNC: 6.7 G/DL (ref 6.4–8.3)
RBC # BLD AUTO: 4.25 M/UL (ref 4–5.2)
SODIUM SERPL-SCNC: 137 MMOL/L (ref 135–144)
TROPONIN I SERPL HS-MCNC: <6 NG/L (ref 0–14)
WBC OTHER # BLD: 10.2 K/UL (ref 3.5–11)

## 2023-08-12 PROCEDURE — 93005 ELECTROCARDIOGRAM TRACING: CPT | Performed by: STUDENT IN AN ORGANIZED HEALTH CARE EDUCATION/TRAINING PROGRAM

## 2023-08-12 PROCEDURE — 80076 HEPATIC FUNCTION PANEL: CPT

## 2023-08-12 PROCEDURE — 96375 TX/PRO/DX INJ NEW DRUG ADDON: CPT

## 2023-08-12 PROCEDURE — 84484 ASSAY OF TROPONIN QUANT: CPT

## 2023-08-12 PROCEDURE — 87086 URINE CULTURE/COLONY COUNT: CPT

## 2023-08-12 PROCEDURE — 73030 X-RAY EXAM OF SHOULDER: CPT

## 2023-08-12 PROCEDURE — 87077 CULTURE AEROBIC IDENTIFY: CPT

## 2023-08-12 PROCEDURE — 96376 TX/PRO/DX INJ SAME DRUG ADON: CPT

## 2023-08-12 PROCEDURE — 81025 URINE PREGNANCY TEST: CPT

## 2023-08-12 PROCEDURE — 81001 URINALYSIS AUTO W/SCOPE: CPT

## 2023-08-12 PROCEDURE — 2580000003 HC RX 258: Performed by: STUDENT IN AN ORGANIZED HEALTH CARE EDUCATION/TRAINING PROGRAM

## 2023-08-12 PROCEDURE — 83735 ASSAY OF MAGNESIUM: CPT

## 2023-08-12 PROCEDURE — 71250 CT THORAX DX C-: CPT

## 2023-08-12 PROCEDURE — 6360000002 HC RX W HCPCS: Performed by: STUDENT IN AN ORGANIZED HEALTH CARE EDUCATION/TRAINING PROGRAM

## 2023-08-12 PROCEDURE — 36415 COLL VENOUS BLD VENIPUNCTURE: CPT

## 2023-08-12 PROCEDURE — 99285 EMERGENCY DEPT VISIT HI MDM: CPT

## 2023-08-12 PROCEDURE — 83690 ASSAY OF LIPASE: CPT

## 2023-08-12 PROCEDURE — 85025 COMPLETE CBC W/AUTO DIFF WBC: CPT

## 2023-08-12 PROCEDURE — 80048 BASIC METABOLIC PNL TOTAL CA: CPT

## 2023-08-12 PROCEDURE — 96374 THER/PROPH/DIAG INJ IV PUSH: CPT

## 2023-08-12 RX ORDER — ONDANSETRON 2 MG/ML
4 INJECTION INTRAMUSCULAR; INTRAVENOUS ONCE
Status: COMPLETED | OUTPATIENT
Start: 2023-08-12 | End: 2023-08-12

## 2023-08-12 RX ORDER — LORAZEPAM 2 MG/ML
1 INJECTION INTRAMUSCULAR ONCE
Status: COMPLETED | OUTPATIENT
Start: 2023-08-12 | End: 2023-08-12

## 2023-08-12 RX ORDER — MORPHINE SULFATE 4 MG/ML
4 INJECTION, SOLUTION INTRAMUSCULAR; INTRAVENOUS ONCE
Status: COMPLETED | OUTPATIENT
Start: 2023-08-12 | End: 2023-08-12

## 2023-08-12 RX ORDER — SODIUM CHLORIDE 9 MG/ML
INJECTION, SOLUTION INTRAVENOUS CONTINUOUS
Status: ACTIVE | OUTPATIENT
Start: 2023-08-12 | End: 2023-08-12

## 2023-08-12 RX ORDER — 0.9 % SODIUM CHLORIDE 0.9 %
1000 INTRAVENOUS SOLUTION INTRAVENOUS ONCE
Status: COMPLETED | OUTPATIENT
Start: 2023-08-12 | End: 2023-08-13

## 2023-08-12 RX ADMIN — SODIUM CHLORIDE: 9 INJECTION, SOLUTION INTRAVENOUS at 21:27

## 2023-08-12 RX ADMIN — SODIUM CHLORIDE 1000 ML: 9 INJECTION, SOLUTION INTRAVENOUS at 22:41

## 2023-08-12 RX ADMIN — LORAZEPAM 1 MG: 2 INJECTION INTRAMUSCULAR; INTRAVENOUS at 21:28

## 2023-08-12 RX ADMIN — LORAZEPAM 1 MG: 2 INJECTION INTRAMUSCULAR; INTRAVENOUS at 22:41

## 2023-08-12 RX ADMIN — MORPHINE SULFATE 4 MG: 4 INJECTION INTRAVENOUS at 23:49

## 2023-08-12 RX ADMIN — ONDANSETRON 4 MG: 2 INJECTION INTRAMUSCULAR; INTRAVENOUS at 21:28

## 2023-08-12 ASSESSMENT — PAIN SCALES - GENERAL
PAINLEVEL_OUTOF10: 9
PAINLEVEL_OUTOF10: 8

## 2023-08-12 ASSESSMENT — PAIN - FUNCTIONAL ASSESSMENT: PAIN_FUNCTIONAL_ASSESSMENT: 0-10

## 2023-08-13 VITALS
DIASTOLIC BLOOD PRESSURE: 97 MMHG | WEIGHT: 163 LBS | RESPIRATION RATE: 18 BRPM | TEMPERATURE: 98.2 F | OXYGEN SATURATION: 98 % | BODY MASS INDEX: 34.22 KG/M2 | SYSTOLIC BLOOD PRESSURE: 141 MMHG | HEIGHT: 58 IN | HEART RATE: 76 BPM

## 2023-08-13 LAB
BACTERIA URNS QL MICRO: ABNORMAL
BILIRUB UR QL STRIP: NEGATIVE
CLARITY UR: ABNORMAL
COLOR UR: YELLOW
EPI CELLS #/AREA URNS HPF: ABNORMAL /HPF (ref 0–5)
GLUCOSE UR STRIP-MCNC: NEGATIVE MG/DL
HGB UR QL STRIP.AUTO: NEGATIVE
KETONES UR STRIP-MCNC: NEGATIVE MG/DL
LEUKOCYTE ESTERASE UR QL STRIP: ABNORMAL
MUCOUS THREADS URNS QL MICRO: ABNORMAL
NITRITE UR QL STRIP: POSITIVE
PH UR STRIP: 7 [PH] (ref 5–8)
PROT UR STRIP-MCNC: ABNORMAL MG/DL
RBC #/AREA URNS HPF: ABNORMAL /HPF (ref 0–2)
SP GR UR STRIP: 1.01 (ref 1–1.03)
UROBILINOGEN UR STRIP-ACNC: NORMAL EU/DL (ref 0–1)
WBC #/AREA URNS HPF: ABNORMAL /HPF (ref 0–5)

## 2023-08-13 PROCEDURE — 6370000000 HC RX 637 (ALT 250 FOR IP): Performed by: STUDENT IN AN ORGANIZED HEALTH CARE EDUCATION/TRAINING PROGRAM

## 2023-08-13 PROCEDURE — 6360000002 HC RX W HCPCS: Performed by: STUDENT IN AN ORGANIZED HEALTH CARE EDUCATION/TRAINING PROGRAM

## 2023-08-13 RX ORDER — IBUPROFEN 600 MG/1
600 TABLET ORAL EVERY 8 HOURS PRN
Qty: 21 TABLET | Refills: 0 | Status: SHIPPED | OUTPATIENT
Start: 2023-08-13 | End: 2023-08-20

## 2023-08-13 RX ORDER — OXYCODONE HYDROCHLORIDE 5 MG/1
5 TABLET ORAL EVERY 12 HOURS PRN
Qty: 6 TABLET | Refills: 0 | Status: SHIPPED | OUTPATIENT
Start: 2023-08-13 | End: 2023-08-16

## 2023-08-13 RX ORDER — CIPROFLOXACIN 250 MG/1
500 TABLET, FILM COATED ORAL ONCE
Status: COMPLETED | OUTPATIENT
Start: 2023-08-13 | End: 2023-08-13

## 2023-08-13 RX ORDER — ONDANSETRON 2 MG/ML
4 INJECTION INTRAMUSCULAR; INTRAVENOUS ONCE
Status: COMPLETED | OUTPATIENT
Start: 2023-08-13 | End: 2023-08-13

## 2023-08-13 RX ORDER — MORPHINE SULFATE 4 MG/ML
4 INJECTION, SOLUTION INTRAMUSCULAR; INTRAVENOUS ONCE
Status: COMPLETED | OUTPATIENT
Start: 2023-08-13 | End: 2023-08-13

## 2023-08-13 RX ORDER — ACETAMINOPHEN 500 MG
1000 TABLET ORAL 3 TIMES DAILY
Qty: 42 TABLET | Refills: 0 | Status: SHIPPED | OUTPATIENT
Start: 2023-08-13 | End: 2023-08-20

## 2023-08-13 RX ORDER — CIPROFLOXACIN 500 MG/1
500 TABLET, FILM COATED ORAL 2 TIMES DAILY
Qty: 14 TABLET | Refills: 0 | Status: SHIPPED | OUTPATIENT
Start: 2023-08-13 | End: 2023-08-20

## 2023-08-13 RX ADMIN — MORPHINE SULFATE 4 MG: 4 INJECTION INTRAVENOUS at 02:13

## 2023-08-13 RX ADMIN — ONDANSETRON 4 MG: 2 INJECTION INTRAMUSCULAR; INTRAVENOUS at 02:13

## 2023-08-13 RX ADMIN — CIPROFLOXACIN 500 MG: 250 TABLET, FILM COATED ORAL at 02:13

## 2023-08-13 ASSESSMENT — PAIN SCALES - GENERAL: PAINLEVEL_OUTOF10: 7
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
## 2023-08-15 LAB
EKG ATRIAL RATE: 77 BPM
EKG P AXIS: 39 DEGREES
EKG P-R INTERVAL: 144 MS
EKG Q-T INTERVAL: 432 MS
EKG QRS DURATION: 86 MS
EKG QTC CALCULATION (BAZETT): 488 MS
EKG R AXIS: 38 DEGREES
EKG T AXIS: 36 DEGREES
EKG VENTRICULAR RATE: 77 BPM

## 2023-08-16 LAB
MICROORGANISM SPEC CULT: ABNORMAL
MICROORGANISM SPEC CULT: ABNORMAL
SPECIMEN DESCRIPTION: ABNORMAL

## 2023-08-17 NOTE — ED PROVIDER NOTES
Intended supply: 3 days.  Take lowest dose possible to manage pain Max Daily Amount: 10 mg, Disp-6 tablet, R-0Normal             Tung Chung DO  Emergency Medicine Physician    (Please note that portions of this note were completed with a voice recognition program.  Efforts were made to edit the dictations but occasionally words are mis-transcribed.)       Tung Chung DO  08/17/23 [id number]

## 2023-11-27 DIAGNOSIS — G40.909 SEIZURE DISORDER (HCC): ICD-10-CM

## 2023-11-27 NOTE — TELEPHONE ENCOUNTER
Patient calling for refill of Onfi.       Medication active on med list yes      Date of last fill: 5/8/23  verified on 11/27/2023   verified by [facility] LPN      Date of last appointment 10/31/22    Next Visit Date:  1/2/2024

## 2023-11-28 RX ORDER — CLOBAZAM 10 MG/1
10 TABLET ORAL NIGHTLY
Qty: 30 TABLET | Refills: 2 | Status: SHIPPED | OUTPATIENT
Start: 2023-11-28 | End: 2024-02-26

## 2024-02-14 ENCOUNTER — OFFICE VISIT (OUTPATIENT)
Dept: NEUROLOGY | Age: 29
End: 2024-02-14
Payer: MEDICARE

## 2024-02-14 VITALS
BODY MASS INDEX: 34.63 KG/M2 | SYSTOLIC BLOOD PRESSURE: 121 MMHG | DIASTOLIC BLOOD PRESSURE: 84 MMHG | WEIGHT: 165 LBS | HEIGHT: 58 IN | HEART RATE: 60 BPM

## 2024-02-14 DIAGNOSIS — G40.909 SEIZURE DISORDER (HCC): Primary | ICD-10-CM

## 2024-02-14 DIAGNOSIS — Z51.81 ENCOUNTER FOR THERAPEUTIC DRUG LEVEL MONITORING: ICD-10-CM

## 2024-02-14 PROCEDURE — G8427 DOCREV CUR MEDS BY ELIG CLIN: HCPCS | Performed by: STUDENT IN AN ORGANIZED HEALTH CARE EDUCATION/TRAINING PROGRAM

## 2024-02-14 PROCEDURE — G8417 CALC BMI ABV UP PARAM F/U: HCPCS | Performed by: STUDENT IN AN ORGANIZED HEALTH CARE EDUCATION/TRAINING PROGRAM

## 2024-02-14 PROCEDURE — 99214 OFFICE O/P EST MOD 30 MIN: CPT | Performed by: STUDENT IN AN ORGANIZED HEALTH CARE EDUCATION/TRAINING PROGRAM

## 2024-02-14 PROCEDURE — G8484 FLU IMMUNIZE NO ADMIN: HCPCS | Performed by: STUDENT IN AN ORGANIZED HEALTH CARE EDUCATION/TRAINING PROGRAM

## 2024-02-14 PROCEDURE — 4004F PT TOBACCO SCREEN RCVD TLK: CPT | Performed by: STUDENT IN AN ORGANIZED HEALTH CARE EDUCATION/TRAINING PROGRAM

## 2024-02-14 RX ORDER — RISPERIDONE 1 MG/1
TABLET ORAL
COMMUNITY
Start: 2024-01-23

## 2024-02-14 RX ORDER — FLUOXETINE HYDROCHLORIDE 20 MG/1
20 CAPSULE ORAL DAILY
COMMUNITY
Start: 2024-01-23

## 2024-02-14 RX ORDER — CLONIDINE HYDROCHLORIDE 0.1 MG/1
0.1 TABLET ORAL NIGHTLY
COMMUNITY
Start: 2024-01-23

## 2024-02-14 RX ORDER — OXCARBAZEPINE 300 MG/1
600 TABLET, FILM COATED ORAL 2 TIMES DAILY
Qty: 360 TABLET | Refills: 4 | Status: SHIPPED | OUTPATIENT
Start: 2024-02-14

## 2024-02-14 RX ORDER — CLOBAZAM 10 MG/1
10 TABLET ORAL NIGHTLY
Qty: 30 TABLET | Refills: 2 | Status: SHIPPED | OUTPATIENT
Start: 2024-02-14 | End: 2024-05-14

## 2024-02-14 RX ORDER — PANTOPRAZOLE SODIUM 20 MG/1
20 TABLET, DELAYED RELEASE ORAL DAILY
COMMUNITY

## 2024-02-14 NOTE — PROGRESS NOTES
[facility] NEUROLOGY SPECIALIST  [address]  [place] [address]  Dept: [phone]    PATIENT NAME: Brittanie Segovia  PATIENT MRN: [id number]  PRIMARY CARE PHYSICIAN: Sam Mena MD    HPI:      Brittanie Segovia is a 28 y.o. female who presents to clinic for follow-up  of No chief complaint on file.     Interim History:    Since last clinic visit, patient had a video EEG completed for 2 days in May 2023.  The EEG did capture one of her typical spells that consists of diffuse shaking, torso movement and intermittently crying in between the spells.  There was no EEG correlate with these spells.  Discussed video EEG was also patient in detail.      Patient notes she has been doing well. She hasn't had a seizure in 2 weeks. The episode two weeks ago was entire body convulsions. These were episodes that were nonepileptic which were captured on video EEG. She notes she was having episodes daily and now she has episodes 4 times a month. Episodes typically occur at night time when she is trying to relax. She notes when the stress is increased she has an episode. Episodes have significantly improved since she got back together with her boyfriend. She is still taking trileptal 600 mg BID and onfi 10 mg daily. Patient has tried to wean off of meds before and notes episodes worsened. She prefers to stay on the medications.     She is going to start counseling this Friday.     EE2023  Baseline EEG Recording:  A formal baseline EEG recording was not obtained.      Day 1 - 23, starting at 10:20 am     Interictal EEG Samples:  In the alert state, the posterior background rhythm was a symmetric, well-modulated, 9-10 Hz, 20-40 uV rhythm which reacted symmetrically to eye opening and had a normal frequency-amplitude gradient with an age-appropriate mixture of frequencies. During drowsiness, there were bursts of diffuse slowing and waxing and waning of the posterior dominant rhythm. During stage II

## 2024-02-20 DIAGNOSIS — Z51.81 ENCOUNTER FOR THERAPEUTIC DRUG LEVEL MONITORING: ICD-10-CM

## 2024-02-20 DIAGNOSIS — G40.909 SEIZURE DISORDER (HCC): ICD-10-CM

## 2024-06-13 ENCOUNTER — TELEPHONE (OUTPATIENT)
Age: 29
End: 2024-06-13

## 2024-06-14 NOTE — TELEPHONE ENCOUNTER
Attempted to call patient to inform her that she needs an appt before refills will be given. Mailbox full and unable to leave a message. Will try again later       Regarding: Needs an appt prior to filling Hormone medication  ----- Message from Jim Urrutia RN sent at 6/13/2024  1:15 PM EDT -----         ----- Message sent from Jayla Coy LPN to Brittanie Segovia at 6/3/2024  4:28 PM -----   Please let patient know that she needs a follow up appointment/annual before refills of hormone patch         
Based on my records, she has not been seen in over one year. I gave one refill as a courtesy, but no more refills until seen.  
Patient called back, she has been seen a couple of times this year and has been on her HRT patch medication. She is an emotional wreck and has to go to work at 2pm. Ok for refill? [facility] in [place], [place]  
Regarding: Needs an appt prior to filling Hormone medication  ----- Message from Jim Urrutia RN sent at 6/13/2024  1:15 PM EDT -----       ----- Message sent from Jayla Coy LPN to Brittanie Segovia at 6/3/2024  4:28 PM -----   Please let patient know that she needs a follow up appointment/annual before refills of hormone patch   
No

## 2024-06-28 RX ORDER — IBANDRONATE SODIUM 150 MG/1
TABLET, FILM COATED ORAL
COMMUNITY
Start: 2024-05-17

## 2024-06-28 RX ORDER — ESTRADIOL 0.1 MG/G
CREAM VAGINAL
COMMUNITY
Start: 2024-04-15

## 2024-06-28 NOTE — PROGRESS NOTES
should be tested for vaginosis, trichomonas, gonorrhea, chlamydia, and possibly herpes, syphilis, hepatitis, and HIV as indicated.    - Self breast exams should be done monthly, preferably after monthly menses & in the shower where tactile response is accentuated with wet, soapy skin. Palpate inward from the armpit in a circular fashion towards the nipple. Gently squeeze the nipple. Evaluate for skin changes, redness, pain, masses, or nipple discharge.    - Influenza vaccines should be considered by all, DPT, HPV (if 12-45 yrs old), Pneumovax, & Shingles vaccines are recommended if not immune. Covid vaccinations and boosters are recommended.    - Additional calcium supplements should be added as well as vitamin D for bone health. Bone density testing to assess the health of your bones begins close to menopause or age 50. Treatment with HRT, SERMs, or bisphosphonates is based on a T score of - 2 or -1.5 with risk factors. Follow up bone density scans are based on current T scores.          2.   Educational handouts were discussed & given when applicable.     3.   Testing recommendations were given as indicated.     4.   Detailed questionnaires regarding the patient's specific condition were given to the patient when indicated. The patient understands that these are her responsibility to complete and return on a voluntary basis. Reminders to complete the questionnaires will not be given. The patient understands that failure to return the questionnaires may not give the provider a full picture of the patient's urogynecologic issues and make treatment less than optimal despite the practitioner's best effort to obtain information.     Multiple records reviewed. All questions were addressed to the patient's satisfaction.  Additional time spent regarding today's visit:  30 min  Point of care: Medical decision making and point of care discussed with the team & supervising physician to qualify as a shared visit when

## 2024-07-01 ENCOUNTER — HOSPITAL ENCOUNTER (OUTPATIENT)
Age: 29
Setting detail: SPECIMEN
Discharge: HOME | End: 2024-07-01

## 2024-07-01 ENCOUNTER — OFFICE VISIT (OUTPATIENT)
Age: 29
End: 2024-07-01
Payer: MEDICARE

## 2024-07-01 VITALS
SYSTOLIC BLOOD PRESSURE: 122 MMHG | WEIGHT: 167 LBS | DIASTOLIC BLOOD PRESSURE: 84 MMHG | BODY MASS INDEX: 34.9 KG/M2 | HEART RATE: 54 BPM

## 2024-07-01 DIAGNOSIS — E89.40 SURGICAL MENOPAUSE: ICD-10-CM

## 2024-07-01 DIAGNOSIS — R10.2 PELVIC PAIN IN FEMALE: ICD-10-CM

## 2024-07-01 DIAGNOSIS — Z72.0 SMOKING TRYING TO QUIT: ICD-10-CM

## 2024-07-01 DIAGNOSIS — R23.2 HOT FLASHES: ICD-10-CM

## 2024-07-01 DIAGNOSIS — R61 NIGHT SWEATS: ICD-10-CM

## 2024-07-01 DIAGNOSIS — Z01.419 WELL WOMAN EXAM WITH ROUTINE GYNECOLOGICAL EXAM: Primary | ICD-10-CM

## 2024-07-01 LAB
BILIRUBIN, POC: NORMAL
BLOOD URINE, POC: 50
CLARITY, POC: CLEAR
COLOR, POC: CLEAR
GLUCOSE URINE, POC: NORMAL
KETONES, POC: 15
LEUKOCYTE EST, POC: 25
NITRITE, POC: NORMAL
PH, POC: 7
PROTEIN, POC: NORMAL
SPECIFIC GRAVITY, POC: 1
UROBILINOGEN, POC: NORMAL

## 2024-07-01 PROCEDURE — G0101 CA SCREEN;PELVIC/BREAST EXAM: HCPCS

## 2024-07-01 PROCEDURE — 81003 URINALYSIS AUTO W/O SCOPE: CPT

## 2024-07-01 ASSESSMENT — ENCOUNTER SYMPTOMS
BLOOD IN STOOL: 0
CONSTIPATION: 0
VOMITING: 0
RECTAL PAIN: 0
VOICE CHANGE: 0
SHORTNESS OF BREATH: 0
TROUBLE SWALLOWING: 0
ABDOMINAL PAIN: 0
NAUSEA: 0
COUGH: 0
CHEST TIGHTNESS: 0
GASTROINTESTINAL NEGATIVE: 1
FACIAL SWELLING: 0
DIARRHEA: 0

## 2024-07-01 NOTE — PATIENT INSTRUCTIONS
Counseling on Hormonal Based Menopausal Management:    The patient was seen and counseled on all forms of hormonal and non-hormonal based medical management of her menopausal symptoms. TThe patient understands the benefits of hormonal birth control most commonly including but not limited to decreased menstrual irregularity and bleeding, decreased hot flashes, decreased vaginal dryness, PMS/PMDD symptoms reduction, and cardiovascular and osteoporosis protection. he patient is aware that hormonal based medications may increase her risk of developing a blood clot which may increase her morbidity and or mortality. The risk is much less than with oral contraceptive therapy at 1/6 the dosage concentration, yet remains about 1-10,000. The patient was counseled on alternate non hormonal based medication options.  We discussed that smoking and any hormonal based medication may increase the patients risks of developing these life threatening blood clots. All patients are encouraged to stop smoking at the time of hormonal-based medication counseling.  Cessation programs were reviewed. If a patient is heterozygous for a familial clotting disorder or has a remote history of superficial thrombosis related to noncoagulopathic hematologic disease, a daily baby aspirin was recommended as a precautionary middle ground measure for thrombotic prevention.    The life threatening side effect profile was reviewed in detail this includes but is not limited to shortness of breath, chest pain, severe or persistent headaches, or calf pain.  If any of these occur the patient has been instructed to stop using her hormonal based medication, notify the office, and go to the emergency department or call 911.    The patient denied any personal history of blood clots in her leg, lung, or heart and denied any family history of stroke, TIA, sudden cardiac death < 40 y.o.,pulmonary embolism, or deep venous thrombosis.    An HRT consent will be signed

## 2024-10-28 DIAGNOSIS — G40.909 SEIZURE DISORDER (HCC): ICD-10-CM

## 2024-10-28 NOTE — TELEPHONE ENCOUNTER
Patient called into the office requesting a refill for her Onfi. The last time our office sent a refill for her was 2/14/2024 with 2 refills from Dr. Manzano. Patient states that she has not taken this in a month, but is not sure who she received this medication from. Patient's current prescription is Onfi 10mg nightly.

## 2024-10-30 RX ORDER — CLOBAZAM 10 MG/1
10 TABLET ORAL NIGHTLY
Qty: 30 TABLET | Refills: 5 | Status: SHIPPED | OUTPATIENT
Start: 2024-10-30 | End: 2025-04-28

## 2024-12-18 DIAGNOSIS — R23.2 HOT FLASHES: ICD-10-CM

## 2024-12-18 DIAGNOSIS — R61 NIGHT SWEATS: ICD-10-CM

## 2024-12-18 RX ORDER — ESTRADIOL 0.1 MG/D
PATCH TRANSDERMAL
Qty: 4 PATCH | Refills: 5 | Status: SHIPPED | OUTPATIENT
Start: 2024-12-18

## 2024-12-30 ENCOUNTER — TELEPHONE (OUTPATIENT)
Dept: UROLOGY | Age: 29
End: 2024-12-30

## 2024-12-30 NOTE — TELEPHONE ENCOUNTER
Patient called office end of day Friday, 12/27/24 stating she is in need of catheter supplies.  She has not been seen since 11/17/22. She has an appointment scheduled for 1/6/25.  Writer called [facility] and LM for them to fax new order request to [phone]

## 2025-01-03 ENCOUNTER — TELEPHONE (OUTPATIENT)
Dept: UROLOGY | Age: 30
End: 2025-01-03

## 2025-01-03 NOTE — TELEPHONE ENCOUNTER
Patient contacted the office stating that she needs our office to contact her insurance company so she is able to get a ride for her appointment on Monday. Patient states that the insurance informed her that it is too late to call to schedule and the only way she cab get scheduled for a ride is if the doctor's office calls to show medical necessity.     [phone]-Phone Number for [facility]it

## 2025-01-06 ENCOUNTER — TELEPHONE (OUTPATIENT)
Dept: UROLOGY | Age: 30
End: 2025-01-06

## 2025-01-06 ENCOUNTER — OFFICE VISIT (OUTPATIENT)
Dept: UROLOGY | Age: 30
End: 2025-01-06
Payer: MEDICARE

## 2025-01-06 ENCOUNTER — HOSPITAL ENCOUNTER (OUTPATIENT)
Age: 30
Setting detail: SPECIMEN
Discharge: HOME | End: 2025-01-06

## 2025-01-06 ENCOUNTER — PREP FOR PROCEDURE (OUTPATIENT)
Dept: UROLOGY | Age: 30
End: 2025-01-06

## 2025-01-06 VITALS
DIASTOLIC BLOOD PRESSURE: 78 MMHG | TEMPERATURE: 97.6 F | SYSTOLIC BLOOD PRESSURE: 126 MMHG | HEIGHT: 58 IN | OXYGEN SATURATION: 98 % | BODY MASS INDEX: 33.58 KG/M2 | HEART RATE: 88 BPM | WEIGHT: 160 LBS

## 2025-01-06 DIAGNOSIS — N99.524: ICD-10-CM

## 2025-01-06 DIAGNOSIS — N31.9 NEUROGENIC BLADDER: ICD-10-CM

## 2025-01-06 DIAGNOSIS — N31.9 NEUROGENIC BLADDER: Primary | ICD-10-CM

## 2025-01-06 PROCEDURE — 4004F PT TOBACCO SCREEN RCVD TLK: CPT | Performed by: UROLOGY

## 2025-01-06 PROCEDURE — 99214 OFFICE O/P EST MOD 30 MIN: CPT | Performed by: UROLOGY

## 2025-01-06 PROCEDURE — G8417 CALC BMI ABV UP PARAM F/U: HCPCS | Performed by: UROLOGY

## 2025-01-06 PROCEDURE — G8428 CUR MEDS NOT DOCUMENT: HCPCS | Performed by: UROLOGY

## 2025-01-06 RX ORDER — BUPROPION HYDROCHLORIDE 150 MG/1
TABLET ORAL
COMMUNITY
Start: 2024-12-26

## 2025-01-06 RX ORDER — ALPRAZOLAM 0.25 MG/1
TABLET ORAL
COMMUNITY

## 2025-01-06 RX ORDER — PROMETHAZINE HYDROCHLORIDE 25 MG/1
25 TABLET ORAL EVERY 8 HOURS PRN
COMMUNITY
Start: 2024-10-01

## 2025-01-06 NOTE — TELEPHONE ENCOUNTER
Annao - mac- mitrofanoff approach @ [facility] 2/6/25 8:30am   PAT same day         Spoke with patient in the office, procedure info given to patient

## 2025-01-06 NOTE — PROGRESS NOTES
Normal  Mouth: Mucosa Moist  Neck: Supple      and Plan      1. Neurogenic bladder    2. Appendicovesicostomy stomal stenosis (HCC)           Plan:   Will attempt cystoscopic access through appendicovesicostomy to reestablish pathway to bladder (MAC at [facility])  Cont urethral cath'ing for now    Assessment & Plan   Return for cystoscopy local [facility] .    Prescriptions Ordered:  No orders of the defined types were placed in this encounter.     Orders Placed:  No orders of the defined types were placed in this encounter.           Thaddeus Dumont MD    Agree with the ROS entered by the MA.

## 2025-01-07 NOTE — TELEPHONE ENCOUNTER
Writer called [address] [place] [address] for new orders to be faxed.  Call was transferred to provider line.  After a hold period,  was left for new orders for patient.

## 2025-01-08 LAB
MICROORGANISM SPEC CULT: ABNORMAL
SERVICE CMNT-IMP: ABNORMAL
SPECIMEN DESCRIPTION: ABNORMAL

## 2025-01-10 ENCOUNTER — TELEPHONE (OUTPATIENT)
Dept: UROLOGY | Age: 30
End: 2025-01-10

## 2025-01-10 RX ORDER — LEVOFLOXACIN 500 MG/1
500 TABLET, FILM COATED ORAL DAILY
Qty: 7 TABLET | Refills: 0 | Status: SHIPPED | OUTPATIENT
Start: 2025-01-10 | End: 2025-01-17

## 2025-01-10 NOTE — TELEPHONE ENCOUNTER
Writer called [facility].  Call was transferred to specialist line and it went straight to .  [facility] was immediately called back.  Writer requested the person who answered to get someone on the phone instead of transferring call.  Courtney took call stating shipment was going out today. They faxed a new order form to be signed yesterday.   Patient is updated.

## 2025-01-10 NOTE — TELEPHONE ENCOUNTER
Patient had appointment on 1/6/25, patient states she informed doctor doing her visit that she needs refill sent in for her catheters she is almost out of them and not sure if she going to make it through the weekend. Sent to [facility]

## 2025-01-10 NOTE — TELEPHONE ENCOUNTER
Friday, OLIVA Alvarado Medical Assistant Signed 8:14 AM     Copy     Patient had appointment on 1/6/25, patient states she informed doctor doing her visit that she needs refill sent in for her catheters she is almost out of them and not sure if she going to make it through the weekend. Sent to [facility]

## 2025-02-05 ENCOUNTER — TELEPHONE (OUTPATIENT)
Dept: UROLOGY | Age: 30
End: 2025-02-05

## 2025-02-05 NOTE — TELEPHONE ENCOUNTER
Veronica with [facility] called, states that patient had tested positive for COVID on 1/25, then negative on 1/30.  Per anesthesia guidelines, patients procedure needs to be cancelled and will need to be rescheduled 4 weeks from negative test.  Writer left patient a message with information, also instructed patient to call office back to discuss further.  [facility] notified as well as provider.

## 2025-02-12 ENCOUNTER — TELEPHONE (OUTPATIENT)
Dept: UROLOGY | Age: 30
End: 2025-02-12

## 2025-02-12 NOTE — TELEPHONE ENCOUNTER
Contacted patient to move forward with rescheduling procedure. Patient states that she is at work and would like a call back tomorrow.

## 2025-02-17 NOTE — TELEPHONE ENCOUNTER
Spoke with patient in regards to rescheduling procedure. Patient states that she will contact the office back. Patient states that she has to speak with her sister in law to see what her schedule is and see what her own work schedule is and states she will follow up with the office to reschedule. Patient informed that a message will be put in and patient is to contact office when dates are decided.

## 2025-03-03 DIAGNOSIS — G40.909 SEIZURE DISORDER (HCC): ICD-10-CM

## 2025-03-03 NOTE — TELEPHONE ENCOUNTER
Pharmacy requesting refill of oxcarbazepine 300 mg.      Medication active on med list yes      Date of last Rx: 2/14/2024 with 4 refills          verified by CHANEL, SOY      Date of last appointment 2/14/2024    Next Visit Date:  4/9/2025

## 2025-03-06 DIAGNOSIS — G40.909 SEIZURE DISORDER (HCC): ICD-10-CM

## 2025-03-06 RX ORDER — OXCARBAZEPINE 300 MG/1
600 TABLET, FILM COATED ORAL 2 TIMES DAILY
Qty: 360 TABLET | Refills: 3 | OUTPATIENT
Start: 2025-03-06

## 2025-03-06 RX ORDER — OXCARBAZEPINE 300 MG/1
600 TABLET, FILM COATED ORAL 2 TIMES DAILY
Qty: 360 TABLET | Refills: 0 | Status: SHIPPED | OUTPATIENT
Start: 2025-03-06

## 2025-03-07 NOTE — TELEPHONE ENCOUNTER
Contacted patient to follow up in regards to scheduling procedure. Patient states that she will need to speak with her sister in law to coordinate dates/times and she has a new boss at work and will need to get speak with them and get that together as well. Patient informed that a message will be sent to Dr Dumont to inform and office will place a recall and will follow up if patient has not followed up by that time. Patient verbalizes understanding and call was ended.     2 month recall placed

## 2025-03-27 ENCOUNTER — HOSPITAL ENCOUNTER (EMERGENCY)
Age: 30
Discharge: HOME | End: 2025-03-27
Attending: EMERGENCY MEDICINE
Payer: MEDICARE

## 2025-03-27 ENCOUNTER — APPOINTMENT (OUTPATIENT)
Dept: CT IMAGING | Age: 30
End: 2025-03-27
Payer: MEDICARE

## 2025-03-27 VITALS
SYSTOLIC BLOOD PRESSURE: 136 MMHG | HEIGHT: 59 IN | TEMPERATURE: 98.6 F | RESPIRATION RATE: 15 BRPM | DIASTOLIC BLOOD PRESSURE: 85 MMHG | HEART RATE: 96 BPM | BODY MASS INDEX: 33.26 KG/M2 | OXYGEN SATURATION: 97 % | WEIGHT: 165 LBS

## 2025-03-27 DIAGNOSIS — E87.6 HYPOKALEMIA: ICD-10-CM

## 2025-03-27 DIAGNOSIS — F41.1 ANXIETY STATE: ICD-10-CM

## 2025-03-27 DIAGNOSIS — R11.2 NAUSEA AND VOMITING, UNSPECIFIED VOMITING TYPE: Primary | ICD-10-CM

## 2025-03-27 LAB
ALBUMIN SERPL-MCNC: 4.3 G/DL (ref 3.5–5.2)
ALBUMIN/GLOB SERPL: 1.5 {RATIO} (ref 1–2.5)
ALP SERPL-CCNC: 107 U/L (ref 35–104)
ALT SERPL-CCNC: 11 U/L (ref 5–33)
AMPHET UR QL SCN: NEGATIVE
ANION GAP SERPL CALCULATED.3IONS-SCNC: 17 MMOL/L (ref 9–17)
AST SERPL-CCNC: 10 U/L
BACTERIA URNS QL MICRO: ABNORMAL
BARBITURATES UR QL SCN: NEGATIVE
BASOPHILS # BLD: 0 K/UL (ref 0–0.2)
BASOPHILS NFR BLD: 0 % (ref 0–2)
BENZODIAZ UR QL: POSITIVE
BILIRUB SERPL-MCNC: 0.5 MG/DL (ref 0.3–1.2)
BILIRUB UR QL STRIP: NEGATIVE
BUN SERPL-MCNC: 10 MG/DL (ref 6–20)
CALCIUM SERPL-MCNC: 8.5 MG/DL (ref 8.6–10.4)
CANNABINOIDS UR QL SCN: POSITIVE
CHARACTER UR: ABNORMAL
CHLORIDE SERPL-SCNC: 90 MMOL/L (ref 98–107)
CLARITY UR: CLEAR
CO2 SERPL-SCNC: 22 MMOL/L (ref 20–31)
COCAINE UR QL SCN: NEGATIVE
COLOR UR: YELLOW
CREAT SERPL-MCNC: 0.6 MG/DL (ref 0.5–0.9)
EOSINOPHIL # BLD: 0 K/UL (ref 0–0.4)
EOSINOPHILS RELATIVE PERCENT: 0 % (ref 1–4)
EPI CELLS #/AREA URNS HPF: ABNORMAL /HPF (ref 0–5)
ERYTHROCYTE [DISTWIDTH] IN BLOOD BY AUTOMATED COUNT: 13.4 % (ref 12.5–15.4)
FENTANYL UR QL: NEGATIVE
GFR, ESTIMATED: >90 ML/MIN/1.73M2
GLUCOSE SERPL-MCNC: 93 MG/DL (ref 70–99)
GLUCOSE UR STRIP-MCNC: NEGATIVE MG/DL
HCT VFR BLD AUTO: 40.3 % (ref 36–46)
HGB BLD-MCNC: 13.6 G/DL (ref 12–16)
HGB UR QL STRIP.AUTO: ABNORMAL
KETONES UR STRIP-MCNC: ABNORMAL MG/DL
LEUKOCYTE ESTERASE UR QL STRIP: NEGATIVE
LIPASE SERPL-CCNC: 26 U/L (ref 13–60)
LYMPHOCYTES NFR BLD: 0.8 K/UL (ref 1–4.8)
LYMPHOCYTES RELATIVE PERCENT: 6 % (ref 24–44)
MCH RBC QN AUTO: 30.3 PG (ref 26–34)
MCHC RBC AUTO-ENTMCNC: 33.8 G/DL (ref 31–37)
MCV RBC AUTO: 89.6 FL (ref 80–100)
METHADONE UR QL: NEGATIVE
MONOCYTES NFR BLD: 0.9 K/UL (ref 0.1–1.2)
MONOCYTES NFR BLD: 7 % (ref 2–11)
NEUTROPHILS NFR BLD: 87 % (ref 36–66)
NEUTS SEG NFR BLD: 11.6 K/UL (ref 1.8–7.7)
NITRITE UR QL STRIP: NEGATIVE
OPIATES UR QL SCN: NEGATIVE
OXYCODONE UR QL SCN: NEGATIVE
PCP UR QL SCN: NEGATIVE
PH UR STRIP: 6 [PH] (ref 5–8)
PLATELET # BLD AUTO: 401 K/UL (ref 140–450)
PMV BLD AUTO: 6.1 FL (ref 6–12)
POTASSIUM SERPL-SCNC: 3.5 MMOL/L (ref 3.7–5.3)
PROT SERPL-MCNC: 7.2 G/DL (ref 6.4–8.3)
PROT UR STRIP-MCNC: NEGATIVE MG/DL
RBC # BLD AUTO: 4.5 M/UL (ref 4–5.2)
RBC #/AREA URNS HPF: ABNORMAL /HPF (ref 0–2)
SODIUM SERPL-SCNC: 129 MMOL/L (ref 135–144)
SP GR UR STRIP: 1.01 (ref 1–1.03)
TEST INFORMATION: ABNORMAL
UROBILINOGEN UR STRIP-ACNC: NORMAL EU/DL (ref 0–1)
WBC #/AREA URNS HPF: ABNORMAL /HPF (ref 0–5)
WBC OTHER # BLD: 13.3 K/UL (ref 3.5–11)

## 2025-03-27 PROCEDURE — 80053 COMPREHEN METABOLIC PANEL: CPT

## 2025-03-27 PROCEDURE — 96374 THER/PROPH/DIAG INJ IV PUSH: CPT | Performed by: EMERGENCY MEDICINE

## 2025-03-27 PROCEDURE — 81001 URINALYSIS AUTO W/SCOPE: CPT

## 2025-03-27 PROCEDURE — 2580000003 HC RX 258: Performed by: EMERGENCY MEDICINE

## 2025-03-27 PROCEDURE — 99284 EMERGENCY DEPT VISIT MOD MDM: CPT | Performed by: EMERGENCY MEDICINE

## 2025-03-27 PROCEDURE — 85025 COMPLETE CBC W/AUTO DIFF WBC: CPT

## 2025-03-27 PROCEDURE — 6370000000 HC RX 637 (ALT 250 FOR IP): Performed by: EMERGENCY MEDICINE

## 2025-03-27 PROCEDURE — 96361 HYDRATE IV INFUSION ADD-ON: CPT | Performed by: EMERGENCY MEDICINE

## 2025-03-27 PROCEDURE — 2500000003 HC RX 250 WO HCPCS: Performed by: EMERGENCY MEDICINE

## 2025-03-27 PROCEDURE — 6360000002 HC RX W HCPCS: Performed by: EMERGENCY MEDICINE

## 2025-03-27 PROCEDURE — 83690 ASSAY OF LIPASE: CPT

## 2025-03-27 PROCEDURE — 80307 DRUG TEST PRSMV CHEM ANLYZR: CPT

## 2025-03-27 PROCEDURE — 74176 CT ABD & PELVIS W/O CONTRAST: CPT

## 2025-03-27 PROCEDURE — 96375 TX/PRO/DX INJ NEW DRUG ADDON: CPT | Performed by: EMERGENCY MEDICINE

## 2025-03-27 RX ORDER — ONDANSETRON 2 MG/ML
4 INJECTION INTRAMUSCULAR; INTRAVENOUS ONCE
Status: DISCONTINUED | OUTPATIENT
Start: 2025-03-27 | End: 2025-03-27 | Stop reason: HOSPADM

## 2025-03-27 RX ORDER — ONDANSETRON 4 MG/1
4 TABLET, FILM COATED ORAL 3 TIMES DAILY PRN
Qty: 15 TABLET | Refills: 0 | Status: SHIPPED | OUTPATIENT
Start: 2025-03-27

## 2025-03-27 RX ORDER — 0.9 % SODIUM CHLORIDE 0.9 %
1000 INTRAVENOUS SOLUTION INTRAVENOUS ONCE
Status: COMPLETED | OUTPATIENT
Start: 2025-03-27 | End: 2025-03-27

## 2025-03-27 RX ORDER — HYDROXYZINE HYDROCHLORIDE 25 MG/1
50 TABLET, FILM COATED ORAL ONCE
Status: COMPLETED | OUTPATIENT
Start: 2025-03-27 | End: 2025-03-27

## 2025-03-27 RX ORDER — SODIUM CHLORIDE 0.9 % (FLUSH) 0.9 %
3 SYRINGE (ML) INJECTION EVERY 8 HOURS
Status: DISCONTINUED | OUTPATIENT
Start: 2025-03-27 | End: 2025-03-27 | Stop reason: HOSPADM

## 2025-03-27 RX ORDER — DIPHENHYDRAMINE HYDROCHLORIDE 50 MG/ML
50 INJECTION, SOLUTION INTRAMUSCULAR; INTRAVENOUS ONCE
Status: COMPLETED | OUTPATIENT
Start: 2025-03-27 | End: 2025-03-27

## 2025-03-27 RX ORDER — ONDANSETRON 2 MG/ML
4 INJECTION INTRAMUSCULAR; INTRAVENOUS ONCE
Status: COMPLETED | OUTPATIENT
Start: 2025-03-27 | End: 2025-03-27

## 2025-03-27 RX ORDER — LORAZEPAM 2 MG/ML
1 INJECTION INTRAMUSCULAR ONCE
Status: COMPLETED | OUTPATIENT
Start: 2025-03-27 | End: 2025-03-27

## 2025-03-27 RX ADMIN — SODIUM CHLORIDE, PRESERVATIVE FREE 3 ML: 5 INJECTION INTRAVENOUS at 10:38

## 2025-03-27 RX ADMIN — ONDANSETRON 4 MG: 2 INJECTION, SOLUTION INTRAMUSCULAR; INTRAVENOUS at 11:40

## 2025-03-27 RX ADMIN — POTASSIUM BICARBONATE 20 MEQ: 782 TABLET, EFFERVESCENT ORAL at 10:39

## 2025-03-27 RX ADMIN — HYDROXYZINE HYDROCHLORIDE 50 MG: 25 TABLET, FILM COATED ORAL at 11:39

## 2025-03-27 RX ADMIN — DIPHENHYDRAMINE HYDROCHLORIDE 50 MG: 50 INJECTION INTRAMUSCULAR; INTRAVENOUS at 09:57

## 2025-03-27 RX ADMIN — SODIUM CHLORIDE 1000 ML: 0.9 INJECTION, SOLUTION INTRAVENOUS at 12:41

## 2025-03-27 RX ADMIN — Medication 1 MG: at 12:39

## 2025-03-27 ASSESSMENT — PAIN - FUNCTIONAL ASSESSMENT: PAIN_FUNCTIONAL_ASSESSMENT: 0-10

## 2025-03-27 ASSESSMENT — PAIN SCALES - GENERAL: PAINLEVEL_OUTOF10: 10

## 2025-03-27 NOTE — ED NOTES
Patient complains of nausea and her adrenaline is making her heart rate fast and she needs something to calm her down. Patient reassured and calmed

## 2025-03-27 NOTE — ED PROVIDER NOTES
[facility]  [address]  [facility] [address]  Phone: [phone]  Fax: [phone]  EMERGENCY DEPARTMENT ENCOUNTER      Pt Name: Brittanie Segovia  MRN: [id number]  Birthdate 1995  Date of evaluation: 3/27/2025    CHIEF COMPLAINT       Chief Complaint   Patient presents with    Seizures       HISTORY OF PRESENT ILLNESS    Brittanie Segovia is a 29 y.o. female who presents to the emergency department via EMS from [employer] [employer] office where she apparently had a seizure.  EMS gave the patient 5 mg of Versed IM and 5 mg IV.  Patient states she has been nauseated and vomiting since Monday.  She says she has a viral illness she has been very sick and not been able to keep anything down.  She has a history of seizures for which she takes Trileptal.  Patient denies any headache, any fever, or chills.  She says she feels generally weak.  She denies any chest pain, shortness of breath.  She denies any abdominal pain, flank pain, hematuria, dysuria.  Patient has not history of cerebral palsy.  At this time the patient's only request is more Ativan, or Xanax as she is very anxious.    REVIEW OF SYSTEMS     Review of Systems   All other systems reviewed and are negative.    PAST MEDICAL HISTORY    has a past medical history of ADD (attention deficit disorder), Anxiety, Bipolar disorder ([facility]), Birth defect, Claustrophobia, Depression, Difficult intravenous access, Digestive problems, Fear of, H/O nephrolithotomy with removal of calculi, Headache, History of suicidal ideation, Hydrocephalus ([facility]), Hydronephrosis, Hypertension, Intermittent self-catheterization of bladder, Internal tibial torsion, Kidney stone, Lab test positive for detection of COVID-19 virus, MDRO (multiple drug resistant organisms) resistance, Memory disorder, Migraine, Mitrofanoff appendicovesicostomy present ([facility]), Myelomeningocele ([facility]), Neurogenic bladder, PTSD (post-traumatic stress disorder), Pyelonephritis, Seizure

## 2025-03-27 NOTE — DISCHARGE INSTRUCTIONS
Take the medication as instructed.  Follow-up with your primary care doctor today in the office as discussed.  Return to the emergency department with any probs or concerns.

## 2025-03-27 NOTE — CARE COORDINATION
Went to the ER to speak to patient and family about patient needs for discharge.  Patient's father said that they are here to have patient admitted as she cannot go home.  Apparently patient lives with a boyfriend who would be unable to assist her. Her father says she has had nausea/vomiting and is unable to take her meds and he wants to speak with the doctor because she \"needs admitted\", unwilling to discuss any home care at this time.  Patient taken for further testing at this time.

## 2025-03-27 NOTE — ED NOTES
Father reports patient having seizure, Dr Baker at bedside states patient not having a seizure, patient tense and hyperventilating. 1 MG ativan given and patient reassured, father remains at bedside

## 2025-05-12 DIAGNOSIS — G40.909 SEIZURE DISORDER (HCC): ICD-10-CM

## 2025-05-12 RX ORDER — CLOBAZAM 10 MG/1
10 TABLET ORAL NIGHTLY
Qty: 30 TABLET | OUTPATIENT
Start: 2025-05-12 | End: 2025-11-08

## 2025-05-12 NOTE — TELEPHONE ENCOUNTER
Patient calling for refill of Onfi.      Medication active on med list yes      Date of last fill: 10/30/24  verified on 5/12/2025   verified by SF LPN      Date of last appointment 2/14/24 ()    Next Visit Date:  7/8/2025 ([place])

## 2025-05-13 RX ORDER — CLOBAZAM 10 MG/1
10 TABLET ORAL NIGHTLY
Qty: 30 TABLET | Refills: 2 | Status: SHIPPED | OUTPATIENT
Start: 2025-05-13 | End: 2025-08-11

## 2025-05-27 DIAGNOSIS — R23.2 HOT FLASHES: ICD-10-CM

## 2025-05-27 DIAGNOSIS — R61 NIGHT SWEATS: ICD-10-CM

## 2025-05-28 RX ORDER — ESTRADIOL 0.1 MG/D
PATCH TRANSDERMAL
Qty: 4 PATCH | Refills: 5 | Status: SHIPPED | OUTPATIENT
Start: 2025-05-28

## 2025-05-28 NOTE — TELEPHONE ENCOUNTER
Refill sent. Patient has upcoming annual due in about 1 month. Please see if she would like to schedule.

## 2025-06-08 DIAGNOSIS — G40.909 SEIZURE DISORDER (HCC): ICD-10-CM

## 2025-06-09 NOTE — TELEPHONE ENCOUNTER
Please send for Dr. Potter      Pharmacy requesting refill of oxcarbazepine 300 mg.      Medication active on med list yes      Date of last Rx: 3/6/2025 with 0 refills          verified by SB, RMA      Date of last appointment 2/14/2024    Next Visit Date:  7/8/2025

## 2025-06-10 RX ORDER — OXCARBAZEPINE 300 MG/1
600 TABLET, FILM COATED ORAL 2 TIMES DAILY
Qty: 60 TABLET | Refills: 0 | Status: SHIPPED | OUTPATIENT
Start: 2025-06-10

## 2025-06-10 NOTE — TELEPHONE ENCOUNTER
Cassie  When I am trying to send the prescription, I am getting a red flag stating patient is allergic to this medication.    Please make sure that the patient is tolerating the medication and not having allergic reactions from this medication.    Thank you  -Dr. Raphael

## 2025-06-11 NOTE — TELEPHONE ENCOUNTER
Call placed to the patient.  Recording states that the call can't be placed at this time.  Will try again later.

## 2025-06-12 NOTE — TELEPHONE ENCOUNTER
Call placed to the patient.  Received message stating that the call could not be completed at this time.

## 2025-06-13 NOTE — TELEPHONE ENCOUNTER
Writer placed a call to anson Mitchell (HIP[employer]) and explained that we were trying to reach Brittanie but unable to get through.  Writer confirmed current number with Stephen, who confirmed that the number is correct.  Writer asked if he could have Brittanie give a call back to the office.  Brittanie called a few minutes later and this was discussed.  Brittanie advised that she does have a spam call blocker on her phone and she will add our office to her contacts.  Brittanie also denied having any allergy to Trileptal (oxcarbazepine), stating that she has been using this for years with no problem.

## 2025-07-08 ENCOUNTER — OFFICE VISIT (OUTPATIENT)
Dept: NEUROLOGY | Age: 30
End: 2025-07-08
Payer: MEDICARE

## 2025-07-08 VITALS
WEIGHT: 154 LBS | HEIGHT: 59 IN | DIASTOLIC BLOOD PRESSURE: 90 MMHG | BODY MASS INDEX: 31.04 KG/M2 | SYSTOLIC BLOOD PRESSURE: 136 MMHG | HEART RATE: 85 BPM

## 2025-07-08 DIAGNOSIS — R56.9 SEIZURE (HCC): Primary | ICD-10-CM

## 2025-07-08 DIAGNOSIS — G40.909 SEIZURE DISORDER (HCC): ICD-10-CM

## 2025-07-08 PROCEDURE — G8417 CALC BMI ABV UP PARAM F/U: HCPCS | Performed by: STUDENT IN AN ORGANIZED HEALTH CARE EDUCATION/TRAINING PROGRAM

## 2025-07-08 PROCEDURE — 4004F PT TOBACCO SCREEN RCVD TLK: CPT | Performed by: STUDENT IN AN ORGANIZED HEALTH CARE EDUCATION/TRAINING PROGRAM

## 2025-07-08 PROCEDURE — G8427 DOCREV CUR MEDS BY ELIG CLIN: HCPCS | Performed by: STUDENT IN AN ORGANIZED HEALTH CARE EDUCATION/TRAINING PROGRAM

## 2025-07-08 PROCEDURE — 99215 OFFICE O/P EST HI 40 MIN: CPT | Performed by: STUDENT IN AN ORGANIZED HEALTH CARE EDUCATION/TRAINING PROGRAM
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
## 2025-07-09 RX ORDER — OXCARBAZEPINE 300 MG/1
600 TABLET, FILM COATED ORAL 2 TIMES DAILY
Qty: 120 TABLET | Refills: 2 | Status: SHIPPED | OUTPATIENT
Start: 2025-07-09

## 2025-07-09 NOTE — TELEPHONE ENCOUNTER
Can you please review for Dr. Potter?    Pharmacy requesting refill of Trileptal 300mg.      Medication active on med list yes      Date of last fill: 6/10/25 #60 R-0  verified on 7/9/2025   verified by VS LPN      Date of last appointment 7/8/25    Next Visit Date:  10/7/2025

## (undated) DEVICE — Z DISCONTINUED BY MEDLINE USE 2711682 TRAY SKIN PREP DRY W/ PREM GLV

## (undated) DEVICE — PAD,ABDOMINAL,5"X9",ST,LF,25/BX: Brand: MEDLINE INDUSTRIES, INC.

## (undated) DEVICE — GARMENT,MEDLINE,DVT,INT,CALF,MED, GEN2: Brand: MEDLINE

## (undated) DEVICE — CATCHER STONE TBNG ADPT SWISS LITHOCLAST

## (undated) DEVICE — CATHETER,URETHRAL,REDRUBBER,STRL,14FR: Brand: MEDLINE INDUSTRIES, INC.

## (undated) DEVICE — SVMMC PEDS/UROLOGY MINOR PACK: Brand: MEDLINE INDUSTRIES, INC.

## (undated) DEVICE — METER,URINE,400ML,DRAIN BAG,L/F,LL: Brand: MEDLINE

## (undated) DEVICE — GUIDEWIRE URO L150CM DIA0.035IN STIFF NIT HYDRPHLC STR TIP

## (undated) DEVICE — CATHETER URET 5FR L70CM OPN END SGL LUMN INJ HUB FLEXIMA

## (undated) DEVICE — SINGLE ACTION PUMPING SYSTEM

## (undated) DEVICE — PACK PROCEDURE SURG CYSTO SVMMC LF

## (undated) DEVICE — GARMENT COMPR STD FOR 17IN CALF UNIF THER FLOTRN

## (undated) DEVICE — CATHETER URETH 16FR BLLN 5CC SIL ALLY W/ SIL HYDRGEL 2 W F

## (undated) DEVICE — Device

## (undated) DEVICE — GUIDEWIRE VASC ANGLED 035X150 STIFF ZIPWIRE

## (undated) DEVICE — SOLUTION SCRB 4OZ 4% CHG H2O AIDED FOR PREOPERATIVE SKIN

## (undated) DEVICE — CATHETER URETH BLLN 5CC 12FR SIL ALLY AND HYDRGEL F INF

## (undated) DEVICE — KIT NEPHSTMY CATH DIA7FR LINGEMAN 0.035IN SUP STIFF J DIL

## (undated) DEVICE — CATHETER URET 5FR L70CM TIP 8FR OPN END CONE TIP INJ HUB

## (undated) DEVICE — GLOVE ORANGE PI 8   MSG9080

## (undated) DEVICE — 3M™ IOBAN™ 2 ANTIMICROBIAL INCISE DRAPE 6650EZ: Brand: IOBAN™ 2

## (undated) DEVICE — EVACUATOR URO BLDR W/ ADPT UROVAC

## (undated) DEVICE — MAT FLOOR ULTRA ABS 28X48IN

## (undated) DEVICE — STRAP,CATHETER,ELASTIC,HOOK&LOOP: Brand: MEDLINE

## (undated) DEVICE — GUIDEWIRE URO L145CM DIA0.035IN TIP L7CM S STL PTFE BENT

## (undated) DEVICE — CATHETER F BLLN 3CC 8FR INF CTRL 2 W PED MOD SIL ALLY AND

## (undated) DEVICE — DRAINBAG,ANTI-REFLUX TOWER,L/F,2000ML,LL: Brand: MEDLINE

## (undated) DEVICE — 3M™ STERI-STRIP™ COMPOUND BENZOIN TINCTURE 40 BAGS/CARTON 4 CARTONS/CASE C1544: Brand: 3M™ STERI-STRIP™

## (undated) DEVICE — GLOVE SURG SZ 65 THK91MIL LTX FREE SYN POLYISOPRENE

## (undated) DEVICE — DRAPE,REIN 53X77,STERILE: Brand: MEDLINE

## (undated) DEVICE — SOLUTION IRRIG 3000ML STRL H2O USP UROMATIC PLAS CONT

## (undated) DEVICE — LIEBERMAN INTRODUCER: Brand: LIEBERMAN

## (undated) DEVICE — GLOVE ORANGE PI 7   MSG9070

## (undated) DEVICE — FIBER LSR 1000UM HOLM SMRT ID SYS REUSE FOR ALL LUMENIS LSR

## (undated) DEVICE — PADDING CAST W2INXL4YD COT LO LINTING WYTEX

## (undated) DEVICE — CATHETER SET URET BG ADPT ASPIR PRT CURITY

## (undated) DEVICE — GAUZE,SPONGE,FLUFF,6"X6.75",STRL,5/TRAY: Brand: MEDLINE

## (undated) DEVICE — TOTAL TRAY, 16FR 10ML SIL FOLEY, URN: Brand: MEDLINE

## (undated) DEVICE — CATHETER,FOLEY,SILVER,16FR,30ML,LF: Brand: MEDLINE

## (undated) DEVICE — GLOVE ORANGE PI 7 1/2   MSG9075

## (undated) DEVICE — STRIP,CLOSURE,WOUND,MEDI-STRIP,1/2X4: Brand: MEDLINE

## (undated) DEVICE — Y-TYPE TUR/BLADDER IRRIGATION SET, REGULATING CLAMP

## (undated) DEVICE — GLOVE SURG SZ 6 THK91MIL LTX FREE SYN POLYISOPRENE ANTI

## (undated) DEVICE — ADAPTER URO SCP UROLOK LL

## (undated) DEVICE — HIGH PRESSURE NEPHROSTOMY BALLOON CATHETER KIT: Brand: NEPHROMAX KIT

## (undated) DEVICE — GLOVE ORANGE PI 8 1/2   MSG9085

## (undated) DEVICE — Z DISCONTINUED NO SUB IDED TAPE UMB W1/8XL36IN WHT COT STRND NONRADIOPAQUE

## (undated) DEVICE — CONTAINER,SPECIMEN,4OZ,OR STRL: Brand: MEDLINE

## (undated) DEVICE — PROTECTOR ULN NRV PUR FOAM HK LOOP STRP ANATOMICALLY

## (undated) DEVICE — DUAL LUMEN URETERAL CATHETER